# Patient Record
Sex: FEMALE | Race: WHITE | URBAN - METROPOLITAN AREA
[De-identification: names, ages, dates, MRNs, and addresses within clinical notes are randomized per-mention and may not be internally consistent; named-entity substitution may affect disease eponyms.]

---

## 2018-02-08 ENCOUNTER — TRANSFERRED RECORDS (OUTPATIENT)
Dept: HEALTH INFORMATION MANAGEMENT | Facility: CLINIC | Age: 58
End: 2018-02-08

## 2018-02-09 ENCOUNTER — TRANSFERRED RECORDS (OUTPATIENT)
Dept: HEALTH INFORMATION MANAGEMENT | Facility: CLINIC | Age: 58
End: 2018-02-09

## 2018-02-10 ENCOUNTER — TRANSFERRED RECORDS (OUTPATIENT)
Dept: HEALTH INFORMATION MANAGEMENT | Facility: CLINIC | Age: 58
End: 2018-02-10

## 2018-02-20 ENCOUNTER — TRANSFERRED RECORDS (OUTPATIENT)
Dept: HEALTH INFORMATION MANAGEMENT | Facility: CLINIC | Age: 58
End: 2018-02-20

## 2018-02-27 PROCEDURE — 00000346 ZZHCL STATISTIC REVIEW OUTSIDE SLIDES TC 88321: Performed by: OBSTETRICS & GYNECOLOGY

## 2018-03-08 LAB — COPATH REPORT: NORMAL

## 2018-03-14 ENCOUNTER — CARE COORDINATION (OUTPATIENT)
Dept: ONCOLOGY | Facility: CLINIC | Age: 58
End: 2018-03-14

## 2018-03-14 NOTE — PROGRESS NOTES
Patient unavailable for pre-visit call. Voicemail with call back number left.    Lakisha Castillo RN

## 2018-03-14 NOTE — PROGRESS NOTES
New Patient Pre-Visit Phone Call:    1) Verify time, date & provider:    2) What to bring:  - Medication list and/or bring medication bottles  - List or notebook - write down all questions to address at visit    3) What to expect:   - Briefly walk Pt through their day  - Duration (2-4 hrs) Bring reading material. Family members welcome.  - Possible labs, EKG, CXR    4) Records/information  - PCP:   - Referring MD information:    5) Road construction  6) Use [place] [address]    Lakisha Castillo RN

## 2018-03-19 ENCOUNTER — RADIANT APPOINTMENT (OUTPATIENT)
Dept: CT IMAGING | Facility: CLINIC | Age: 58
End: 2018-03-19
Attending: OBSTETRICS & GYNECOLOGY
Payer: COMMERCIAL

## 2018-03-19 ENCOUNTER — ONCOLOGY VISIT (OUTPATIENT)
Dept: ONCOLOGY | Facility: CLINIC | Age: 58
End: 2018-03-19
Attending: OBSTETRICS & GYNECOLOGY
Payer: COMMERCIAL

## 2018-03-19 VITALS
BODY MASS INDEX: 39.53 KG/M2 | HEART RATE: 75 BPM | HEIGHT: 63 IN | SYSTOLIC BLOOD PRESSURE: 148 MMHG | WEIGHT: 223.11 LBS | TEMPERATURE: 98.2 F | RESPIRATION RATE: 18 BRPM | DIASTOLIC BLOOD PRESSURE: 94 MMHG | OXYGEN SATURATION: 98 %

## 2018-03-19 DIAGNOSIS — C54.1 ENDOMETRIAL CANCER (H): ICD-10-CM

## 2018-03-19 DIAGNOSIS — C54.1 ENDOMETRIAL CANCER (H): Primary | ICD-10-CM

## 2018-03-19 LAB
CREAT BLD-MCNC: 0.7 MG/DL (ref 0.52–1.04)
GFR SERPL CREATININE-BSD FRML MDRD: 87 ML/MIN/1.7M2

## 2018-03-19 PROCEDURE — G0463 HOSPITAL OUTPT CLINIC VISIT: HCPCS | Mod: ZF

## 2018-03-19 PROCEDURE — 99205 OFFICE O/P NEW HI 60 MIN: CPT | Mod: ZP | Performed by: OBSTETRICS & GYNECOLOGY

## 2018-03-19 RX ORDER — ONDANSETRON 4 MG/1
TABLET, ORALLY DISINTEGRATING ORAL
COMMUNITY
Start: 2018-03-13 | End: 2019-08-28

## 2018-03-19 RX ORDER — IOPAMIDOL 755 MG/ML
150 INJECTION, SOLUTION INTRAVASCULAR ONCE
Status: COMPLETED | OUTPATIENT
Start: 2018-03-19 | End: 2018-03-19

## 2018-03-19 RX ORDER — PROMETHAZINE HYDROCHLORIDE AND CODEINE PHOSPHATE 6.25; 1 MG/5ML; MG/5ML
10 SYRUP ORAL
Status: ON HOLD | COMMUNITY
Start: 2018-03-13 | End: 2018-07-02

## 2018-03-19 RX ORDER — LOSARTAN POTASSIUM AND HYDROCHLOROTHIAZIDE 12.5; 1 MG/1; MG/1
1 TABLET ORAL
COMMUNITY
Start: 2018-03-01 | End: 2018-11-08

## 2018-03-19 RX ORDER — GLUCOSAMINE HCL/CHONDROITIN SU 500-400 MG
1 CAPSULE ORAL
COMMUNITY
Start: 2018-02-10 | End: 2018-11-08

## 2018-03-19 RX ORDER — ATORVASTATIN CALCIUM 20 MG/1
20 TABLET, FILM COATED ORAL
COMMUNITY
Start: 2018-03-01 | End: 2018-11-08

## 2018-03-19 RX ORDER — PEN NEEDLE, DIABETIC 30 GX5/16"
NEEDLE, DISPOSABLE MISCELLANEOUS
COMMUNITY
Start: 2018-02-10 | End: 2018-11-08

## 2018-03-19 RX ORDER — LANCETS 30 GAUGE
1 EACH MISCELLANEOUS
COMMUNITY
Start: 2018-02-10 | End: 2018-11-08

## 2018-03-19 RX ORDER — DOCUSATE SODIUM 100 MG/1
200 CAPSULE, LIQUID FILLED ORAL
COMMUNITY
Start: 2018-02-14 | End: 2018-10-17

## 2018-03-19 RX ORDER — HYDROCODONE BITARTRATE AND ACETAMINOPHEN 5; 325 MG/1; MG/1
TABLET ORAL
Status: ON HOLD | COMMUNITY
Start: 2018-03-25 | End: 2018-07-02

## 2018-03-19 RX ORDER — ASPIRIN 81 MG/1
81 TABLET, CHEWABLE ORAL
COMMUNITY
Start: 2018-03-01

## 2018-03-19 RX ORDER — DIPHENHYDRAMINE HYDROCHLORIDE 25 MG/1
CAPSULE, LIQUID FILLED ORAL
COMMUNITY
Start: 2018-02-10 | End: 2018-11-08

## 2018-03-19 RX ORDER — NAPROXEN SODIUM 220 MG
220 TABLET ORAL
Status: ON HOLD | COMMUNITY
End: 2018-07-02

## 2018-03-19 RX ADMIN — IOPAMIDOL 150 ML: 755 INJECTION, SOLUTION INTRAVASCULAR at 18:44

## 2018-03-19 ASSESSMENT — PAIN SCALES - GENERAL: PAINLEVEL: NO PAIN (1)

## 2018-03-19 NOTE — LETTER
3/19/2018       RE: Manda Santacruz  [address] [facility] [address]     Dear Colleague,    Thank you for referring your patient, Manda Santacruz, to the [facility]. Please see a copy of my visit note below.                            Consult Notes on Referred Patient    Date: 3/19/2018       Dr. Devonte Figueroa [facility]  [address] [place] [address]       RE: Manda Santacruz  : 1960  CULLEN: 3/19/2018    Dear Dr. Devonte TERRAZAS*:    I had the pleasure of seeing your patient Manda Santacruz here at the Gynecologic Cancer Clinic at the [facility] on 3/19/2018.  As you know she is a very pleasant 57 year old woman with a recent diagnosis of serous endometrial cancer.  Given these findings she was subsequently sent to the Gynecologic Cancer Clinic for new patient consultation.   The patient is G1, P1, 1 prior vaginal delivery.  She has not gone through menopause yet.  She continues bleeding.  She is not on hormone replacement therapy.  She does not have any hot flashes.   Abdominal pain in early February which prompted an emergency room visit and a CT scan revealing an enlarged uterus.  Endometrial biopsy was done at that time revealing a high-grade serous endometrial carcinoma.  It has been reviewed here.  She has otherwise no change in urinary or bowel habits.  No B symptoms.           Past Medical History:  1.  Diabetes on insulin.    2.  Hyperten        Past Surgical History:  Removal of kidney stones.           Health Maintenance:  Health Maintenance Due   Topic Date Due     TETANUS IMMUNIZATION (SYSTEM ASSIGNED)  1979     HEPATITIS C SCREENING  1979     PAP SCREENING Q3 YR (SYSTEM ASSIGNED)  1982     LIPID SCREEN Q5 YR FEMALE (SYSTEM ASSIGNED)  2006     MAMMO SCREEN Q2 YR (SYSTEM ASSIGNED)  2011     COLON CANCER SCREEN (SYSTEM ASSIGNED)  2011     ADVANCE DIRECTIVE PLANNING Q5 YRS  2016     No  "history of abnormal Pap smear; however, last Pap smear had been several years ago.             Current Medications:     has a current medication list which includes the following prescription(s): aspirin, atorvastatin, blood glucose monitor system, blood glucose test strips, docusate sodium, insulin aspart, insulin glargine, lancets, pen needles 5/16\", metformin, losartan-hydrochlorothiazide, ondansetron, promethazine-codeine, iohexol, hydrocodone-acetaminophen, and naproxen sodium.       Allergies:     [unfilled]        Social History:     Social History   Substance Use Topics     Smoking status: Never Smoker     Smokeless tobacco: Never Used     Alcohol use Not on file       History   Drug Use Not on file           Family History:   No known family history of cancer.           Physical Exam:     BP (!) 148/94  Pulse 75  Temp 98.2  F (36.8  C) (Oral)  Resp 18  Ht 1.595 m (5' 2.8\")  Wt 101.2 kg (223 lb 1.7 oz)  SpO2 98%  BMI 39.78 kg/m2  Body mass index is 39.78 kg/(m^2).    General Appearance: healthy and alert, no distress     Skin: no lesions or rashes     Neurological: normal gait, no gross defects     Psychiatric: appropriate mood and affect                               ABDOMEN:  Soft, nontender, nondistended, no organomegaly.   PELVIC:  Normal external genitalia.  Vaginal exam, normal vaginal mucosa.  There is a large protruding mass from the cervix.  This feels jerry as well as appears to involve parametria.  Enlarged uterus, mobile.  No adnexal masses or tenderness.  Rectovaginal confirms.  This also feels to be free of the rectum.           Assessment:    Manda Santacruz is a 57 year old woman with a new diagnosis of serous endometrial cancer.     A total of 60 minutes was spent with the patient, 40 minutes of which were spent in counseling the patient and/or treatment planning.      1.  Serous endometrial carcinoma.   2.  Diabetes.      Discussed with the patient we will obtain a CT chest, " abdomen and pelvis to evaluate for more distant disease.  It appears she has at least locally advanced disease.  She has enlarged tubes.  Mass protruding from the cervix.  I will see her back after we have the CT scan.  If there is no evidence of distant disease, we will proceed with exploratory laparotomy and hysterectomy with possible more of a radical hysterectomy and cytoreductive effort.  Followed by adjuvant chemotherapy and possible radiation depending on findings.  The patient agrees with the plan, is very appreciative of her care.  All questions were answered.             Thank you for allowing us to participate in the care of your patient.         Sincerely,    Prabhjot Rm MD, MS    Department of Obstetrics and Gynecology   Division of Gynecologic Oncology   [facility]  Phone: [phone]      CC  Patient Care Team:  Devonte Carranza as PCP - General

## 2018-03-19 NOTE — MR AVS SNAPSHOT
"              After Visit Summary   3/19/2018    Manda Santacruz    MRN: [id number]           Patient Information     Date Of Birth          1960        Visit Information        Provider Department      3/19/2018 3:00 PM Prabhjot Rm MD [facility]        Today's Diagnoses     Endometrial cancer (H)    -  1       Follow-ups after your visit        Who to contact     If you have questions or need follow up information about today's clinic visit or your schedule please contact [facility], located within [facility] directly at [phone].  Normal or non-critical lab and imaging results will be communicated to you by [employer], letter or phone within 4 business days after the clinic has received the results. If you do not hear from us within 7 days, please contact the clinic through [employer] or phone. If you have a critical or abnormal lab result, we will notify you by phone as soon as possible.  Submit refill requests through [employer] or call your pharmacy and they will forward the refill request to us. Please allow 3 business days for your refill to be completed.          Additional Information About Your Visit        MyChart Information     [employer] lets you send messages to your doctor, view your test results, renew your prescriptions, schedule appointments and more. To sign up, go to [email or web address] . Click on \"Log in\" on the left side of the screen, which will take you to the Welcome page. Then click on \"Sign up Now\" on the right side of the page.     You will be asked to enter the access code listed below, as well as some personal information. Please follow the directions to create your username and password.     Your access code is: [email or web address]  Expires: 2018  1:17 PM     Your access code will  in 90 days. If you need help or a new code, please call your [facility] clinic or [phone].        Care EveryWhere ID     This is your Care EveryWhere ID. This could be used " "by other organizations to access your [facility] medical records  [id number]        Your Vitals Were     Pulse Temperature Respirations Height Pulse Oximetry BMI (Body Mass Index)    75 98.2  F (36.8  C) (Oral) 18 1.595 m (5' 2.8\") 98% 39.78 kg/m2       Blood Pressure from Last 3 Encounters:   03/19/18 (!) 148/94    Weight from Last 3 Encounters:   03/19/18 101.2 kg (223 lb 1.7 oz)              Today, you had the following     No orders found for display         Today's Medication Changes          These changes are accurate as of 3/19/18 11:59 PM.  If you have any questions, ask your nurse or doctor.               Start taking these medicines.        Dose/Directions    iohexol 140 MG/ML Soln solution   Commonly known as:  OMNIPAQUE   Used for:  Endometrial cancer (H)   Started by:  Prabhjot Rm MD        Mix entire bottle (50ml) of contast with 600ml (20 ounces) of water and drink half 2 hrs prior to CT scan and half 1 hr prior to scan   Quantity:  50 mL   Refills:  5            Where to get your medicines      These medications were sent to [facility] [address] [facility] [phone]  [address] [facility] [address] [address]    Hours:  TRANSPLANT PHONE NUMBER [phone] Phone:  [phone]     iohexol 140 MG/ML Soln solution                Primary Care Provider Office Phone # Fax #    [facility] [phone]       [address] [facility] [address]        Equal Access to Services     KIMBERLEE RICHARDS AH: Hadii aad ku hadasho Soomaali, waaxda luqadaha, qaybta kaalmada adeegyamateo, moy buitrago. So [facility] [phone].    ATENCIÓN: Si habla español, tiene a roach disposición servicios gratuitos de asistencia lingüística. Llame al [phone].    We comply with applicable federal civil rights laws and [place] laws. We do not discriminate on the basis of race, color, national origin, age, disability, sex, " "sexual orientation, or gender identity.            Thank you!     Thank you for choosing [facility]  for your care. Our goal is always to provide you with excellent care. Hearing back from our patients is one way we can continue to improve our services. Please take a few minutes to complete the written survey that you may receive in the mail after your visit with us. Thank you!             Your Updated Medication List - Protect others around you: Learn how to safely use, store and throw away your medicines at www.disposemymeds.org.          This list is accurate as of 3/19/18 11:59 PM.  Always use your most recent med list.                   Brand Name Dispense Instructions for use Diagnosis    aspirin 81 MG chewable tablet      81 mg        atorvastatin 20 MG tablet    LIPITOR     20 mg        Blood Glucose Monitor System W/DEVICE Kit      1 Kit        BLOOD GLUCOSE TEST STRIPS Strp      1 strip        docusate sodium 100 MG capsule    COLACE     200 mg        HYDROcodone-acetaminophen 5-325 MG per tablet   Start taking on:  3/25/2018    NORCO     2 po bid prn        insulin aspart 100 UNIT/ML injection    NovoLOG PEN     4-15 Units        insulin glargine 100 UNIT/ML injection    LANTUS     20 Units        iohexol 140 MG/ML Soln solution    OMNIPAQUE    50 mL    Mix entire bottle (50ml) of contast with 600ml (20 ounces) of water and drink half 2 hrs prior to CT scan and half 1 hr prior to scan    Endometrial cancer (H)       Lancets Misc      1 each        losartan-hydrochlorothiazide 100-12.5 MG per tablet    HYZAAR     1 tablet        metFORMIN 1000 MG tablet    GLUCOPHAGE     1,000 mg        naproxen sodium 220 MG tablet    ANAPROX     220 mg        ondansetron 4 MG ODT tab    ZOFRAN-ODT     DISSOLVE ONE TABLET BY MOUTH EVERY 4 HOURS AS NEEDED FOR NAUSEA        Pen Needles 5/16\" 30G X 8 MM Misc      Use as directed        promethazine-codeine 6.25-10 MG/5ML syrup    PHENERGAN with codeine    "  10 mLs

## 2018-03-20 LAB — RADIOLOGIST FLAGS: ABNORMAL

## 2018-03-20 NOTE — PROGRESS NOTES
Consult Notes on Referred Patient    Date: 3/19/2018       Dr. Devonte Quiroz [facility]  [address] [facility]  [place] [address]       RE: Manda Santacruz  : 1960  CULLEN: 3/19/2018    Dear Dr. Devonte TERRAZAS*:    I had the pleasure of seeing your patient Manda Santacruz here at the Gynecologic Cancer Clinic at the [facility] on 3/19/2018.  As you know she is a very pleasant 57 year old woman with a recent diagnosis of serous endometrial cancer.  Given these findings she was subsequently sent to the Gynecologic Cancer Clinic for new patient consultation.   The patient is G1, P1, 1 prior vaginal delivery.  She has not gone through menopause yet.  She continues bleeding.  She is not on hormone replacement therapy.  She does not have any hot flashes.   Abdominal pain in early February which prompted an emergency room visit and a CT scan revealing an enlarged uterus.  Endometrial biopsy was done at that time revealing a high-grade serous endometrial carcinoma.  It has been reviewed here.  She has otherwise no change in urinary or bowel habits.  No B symptoms.           Past Medical History:  1.  Diabetes on insulin.    2.  Hyperten        Past Surgical History:  Removal of kidney stones.           Health Maintenance:  Health Maintenance Due   Topic Date Due     TETANUS IMMUNIZATION (SYSTEM ASSIGNED)  1979     HEPATITIS C SCREENING  1979     PAP SCREENING Q3 YR (SYSTEM ASSIGNED)  1982     LIPID SCREEN Q5 YR FEMALE (SYSTEM ASSIGNED)  2006     MAMMO SCREEN Q2 YR (SYSTEM ASSIGNED)  2011     COLON CANCER SCREEN (SYSTEM ASSIGNED)  2011     ADVANCE DIRECTIVE PLANNING Q5 YRS  2016     No history of abnormal Pap smear; however, last Pap smear had been several years ago.             Current Medications:     has a current medication list which includes the following prescription(s): aspirin, atorvastatin, blood glucose monitor  "system, blood glucose test strips, docusate sodium, insulin aspart, insulin glargine, lancets, pen needles 5/16\", metformin, losartan-hydrochlorothiazide, ondansetron, promethazine-codeine, iohexol, hydrocodone-acetaminophen, and naproxen sodium.       Allergies:     [unfilled]        Social History:     Social History   Substance Use Topics     Smoking status: Never Smoker     Smokeless tobacco: Never Used     Alcohol use Not on file       History   Drug Use Not on file           Family History:   No known family history of cancer.           Physical Exam:     BP (!) 148/94  Pulse 75  Temp 98.2  F (36.8  C) (Oral)  Resp 18  Ht 1.595 m (5' 2.8\")  Wt 101.2 kg (223 lb 1.7 oz)  SpO2 98%  BMI 39.78 kg/m2  Body mass index is 39.78 kg/(m^2).    General Appearance: healthy and alert, no distress     Skin: no lesions or rashes     Neurological: normal gait, no gross defects     Psychiatric: appropriate mood and affect                               ABDOMEN:  Soft, nontender, nondistended, no organomegaly.   PELVIC:  Normal external genitalia.  Vaginal exam, normal vaginal mucosa.  There is a large protruding mass from the cervix.  This feels jerry as well as appears to involve parametria.  Enlarged uterus, mobile.  No adnexal masses or tenderness.  Rectovaginal confirms.  This also feels to be free of the rectum.           Assessment:    Manda Santacruz is a 57 year old woman with a new diagnosis of serous endometrial cancer.     A total of 60 minutes was spent with the patient, 40 minutes of which were spent in counseling the patient and/or treatment planning.      1.  Serous endometrial carcinoma.   2.  Diabetes.      Discussed with the patient we will obtain a CT chest, abdomen and pelvis to evaluate for more distant disease.  It appears she has at least locally advanced disease.  She has enlarged tubes.  Mass protruding from the cervix.  I will see her back after we have the CT scan.  If there is no evidence " of distant disease, we will proceed with exploratory laparotomy and hysterectomy with possible more of a radical hysterectomy and cytoreductive effort.  Followed by adjuvant chemotherapy and possible radiation depending on findings.  The patient agrees with the plan, is very appreciative of her care.  All questions were answered.             Thank you for allowing us to participate in the care of your patient.         Sincerely,    Prabhjot Rm MD, MS    Department of Obstetrics and Gynecology   Division of Gynecologic Oncology   [facility]  Phone: [phone]      CC  Patient Care Team:  Devonte Michael as PCP - General  DEVONTE MICHAEL

## 2018-03-21 DIAGNOSIS — C54.1 ENDOMETRIAL CANCER (H): Primary | ICD-10-CM

## 2018-03-21 NOTE — PROGRESS NOTES
Result reviewed by MD. Patient needs an abdominal MRI. Return visit with Dr. Rm to discuss treatment options. Patient called with follow up plan.

## 2018-03-28 ENCOUNTER — CARE COORDINATION (OUTPATIENT)
Dept: ONCOLOGY | Facility: CLINIC | Age: 58
End: 2018-03-28

## 2018-03-28 ENCOUNTER — ONCOLOGY VISIT (OUTPATIENT)
Dept: ONCOLOGY | Facility: CLINIC | Age: 58
End: 2018-03-28
Attending: OBSTETRICS & GYNECOLOGY
Payer: COMMERCIAL

## 2018-03-28 DIAGNOSIS — F41.9 ANXIETY: Primary | ICD-10-CM

## 2018-03-28 PROCEDURE — 99215 OFFICE O/P EST HI 40 MIN: CPT | Mod: ZP | Performed by: OBSTETRICS & GYNECOLOGY

## 2018-03-28 RX ORDER — LORAZEPAM 0.5 MG/1
0.5 TABLET ORAL DAILY PRN
Qty: 2 TABLET | Refills: 0 | Status: SHIPPED | OUTPATIENT
Start: 2018-03-28 | End: 2018-10-17

## 2018-03-28 NOTE — LETTER
"3/28/2018       RE: Manda Santacruz  [address] [place] [id number]     Dear Colleague,    Thank you for referring your patient, Manda Santacruz, to the [facility]. Please see a copy of my visit note below.                Follow Up Notes on Referred Patient    Date: 3/28/2018       Dr. Devonte Figueroa [facility]  [address] [place] [address]       RE: Manda Santacruz  : 1960  CULLEN: 3/28/2018    Dear Dr. Devonte Quiroz C*:    Manda Santacruz is a 57 year old woman with a diagnosis of serous endometrial cancer.    The patient presents today for followup.  She has no new symptoms since the last time I have seen her.               Health Maintenance Due   Topic Date Due     TETANUS IMMUNIZATION (SYSTEM ASSIGNED)  1979     HEPATITIS C SCREENING  1979     PAP SCREENING Q3 YR (SYSTEM ASSIGNED)  1982     LIPID SCREEN Q5 YR FEMALE (SYSTEM ASSIGNED)  2006     MAMMO SCREEN Q2 YR (SYSTEM ASSIGNED)  2011     COLON CANCER SCREEN (SYSTEM ASSIGNED)  2011     ADVANCE DIRECTIVE PLANNING Q5 YRS  2016       Current Medications:     Current Outpatient Prescriptions   Medication Sig Dispense Refill     LORazepam (ATIVAN) 0.5 MG tablet Take 1 tablet (0.5 mg) by mouth daily as needed for anxiety (Take one tablet now. Repeat in 20 minutes if needed) 2 tablet 0     aspirin 81 MG chewable tablet 81 mg       atorvastatin (LIPITOR) 20 MG tablet 20 mg       Blood Glucose Monitoring Suppl (BLOOD GLUCOSE MONITOR SYSTEM) W/DEVICE KIT 1 Kit       Glucose Blood (BLOOD GLUCOSE TEST STRIPS) STRP 1 strip       docusate sodium (COLACE) 100 MG capsule 200 mg       HYDROcodone-acetaminophen (NORCO) 5-325 MG per tablet 2 po bid prn       insulin aspart (NOVOLOG PEN) 100 UNIT/ML injection 4-15 Units       insulin glargine (LANTUS) 100 UNIT/ML injection 20 Units       Lancets MISC 1 each       Insulin Pen Needle (PEN NEEDLES \") 30G X 8 MM MISC Use " as directed       metFORMIN (GLUCOPHAGE) 1000 MG tablet 1,000 mg       losartan-hydrochlorothiazide (HYZAAR) 100-12.5 MG per tablet 1 tablet       naproxen sodium (ANAPROX) 220 MG tablet 220 mg       ondansetron (ZOFRAN-ODT) 4 MG ODT tab DISSOLVE ONE TABLET BY MOUTH EVERY 4 HOURS AS NEEDED FOR NAUSEA       promethazine-codeine (PHENERGAN WITH CODEINE) 6.25-10 MG/5ML syrup 10 mLs       iohexol (OMNIPAQUE) 140 MG/ML SOLN solution Mix entire bottle (50ml) of contast with 600ml (20 ounces) of water and drink half 2 hrs prior to CT scan and half 1 hr prior to scan 50 mL 5         Allergies:      No Known Allergies     Social History:     Social History   Substance Use Topics     Smoking status: Never Smoker     Smokeless tobacco: Never Used     Alcohol use Not on file       History   Drug Use Not on file             Physical Exam:     There were no vitals taken for this visit.  There is no height or weight on file to calculate BMI.    General Appearance: healthy and alert, no distress           Assessment:    Manda Santacruz is a 57 year old woman with a diagnosis of serous endometrial cancer.     A total of 40 minutes was spent with the patient, 30 minutes of which were spent in counseling the patient and/or treatment planning.    1.  Advanced high-grade serous endometrial cancer.   2.  Suspected stage IV.      I had a long discussion with the patient as well as with her family.  We did review in detail the CT scan, which suggests locally advanced endometrial cancer with colon, rectum as well as small bowel and adnexal involvement.  This is consistent with the exam I performed on the last visit.  We will obtain an MRI today for further clarification.  We did discuss that would make her a stage IV, and a surgical R0 resection would require extensive surgical cytoreduction, including possible end colostomy, large as well as small bowel resections.  We did discuss that I would recommend to start with systemic  chemotherapy with carboplatin with an AUC of 6 and paclitaxel in a dose of 175 mg/m2 every 3 weeks for 3 cycles and then reassess with a CT chest, abdomen and pelvis here.  She will plan to have this treatment closer to home in [place].  We did discuss the pros and cons of primary cytoreduction followed by chemotherapy versus adjuvant chemotherapy.  Starting with neoadjuvant chemotherapy will tell us whether this tumor is chemosensitive as well as potentially reduce surgical risk and improve chances for R0 resection.  The patient as well as her family agree with this plan.  They are very appreciative of her care.  All questions were answered.       Prabhjot Rm MD, MS    Department of Obstetrics and Gynecology   Division of Gynecologic Oncology   [facility]  Phone: [phone]        CC  Patient Care Team:  Devonte Carranza as PCP - General

## 2018-03-28 NOTE — MR AVS SNAPSHOT
"              After Visit Summary   3/28/2018    Manda Santacruz    MRN: [id number]           Patient Information     Date Of Birth          1960        Visit Information        Provider Department      3/28/2018 2:00 PM Prabhjot Rm MD [facility]        Today's Diagnoses     Anxiety    -  1       Follow-ups after your visit        Who to contact     If you have questions or need follow up information about today's clinic visit or your schedule please contact [facility] directly at [phone].  Normal or non-critical lab and imaging results will be communicated to you by Cueddhart, letter or phone within 4 business days after the clinic has received the results. If you do not hear from us within 7 days, please contact the clinic through Cueddhart or phone. If you have a critical or abnormal lab result, we will notify you by phone as soon as possible.  Submit refill requests through Jinni or call your pharmacy and they will forward the refill request to us. Please allow 3 business days for your refill to be completed.          Additional Information About Your Visit        MyChart Information     Jinni lets you send messages to your doctor, view your test results, renew your prescriptions, schedule appointments and more. To sign up, go to [email or web address] . Click on \"Log in\" on the left side of the screen, which will take you to the Welcome page. Then click on \"Sign up Now\" on the right side of the page.     You will be asked to enter the access code listed below, as well as some personal information. Please follow the directions to create your username and password.     Your access code is: [email or web address]  Expires: 2018  1:17 PM     Your access code will  in 90 days. If you need help or a new code, please call your [facility] or [phone].        Care EveryWhere ID     This is your Care EveryWhere ID. This could be used by other " organizations to access your [facility] medical records  [id number]         Blood Pressure from Last 3 Encounters:   03/19/18 (!) 148/94    Weight from Last 3 Encounters:   03/19/18 101.2 kg (223 lb 1.7 oz)              Today, you had the following     No orders found for display         Today's Medication Changes          These changes are accurate as of 3/28/18 11:59 PM.  If you have any questions, ask your nurse or doctor.               Start taking these medicines.        Dose/Directions    LORazepam 0.5 MG tablet   Commonly known as:  ATIVAN   Used for:  Anxiety   Started by:  Prabhjot Rm MD        Dose:  0.5 mg   Take 1 tablet (0.5 mg) by mouth daily as needed for anxiety (Take one tablet now. Repeat in 20 minutes if needed)   Quantity:  2 tablet   Refills:  0            Where to get your medicines      Some of these will need a paper prescription and others can be bought over the counter.  Ask your nurse if you have questions.     Bring a paper prescription for each of these medications     LORazepam 0.5 MG tablet                Primary Care Provider Office Phone # Fax #    Devonte [facility] [phone]       [address] [address]        Equal Access to Services     KIMBERLEE RICHARDS AH: Hadii michael hitchcock hadasho Someshaali, waaxda luqadaha, qaybta kaalmada adeegyada, moy buitrago. So [facility] [phone].    ATENCIÓN: Si habla español, tiene a roach disposición servicios gratuitos de asistencia lingüística. Llame al [phone].    We comply with applicable federal civil rights laws and [place] laws. We do not discriminate on the basis of race, color, national origin, age, disability, sex, sexual orientation, or gender identity.            Thank you!     Thank you for choosing [facility]  for your care. Our goal is always to provide you with excellent care. Hearing back from our patients is one way we can continue to improve  "our services. Please take a few minutes to complete the written survey that you may receive in the mail after your visit with us. Thank you!             Your Updated Medication List - Protect others around you: Learn how to safely use, store and throw away your medicines at www.disposemymeds.org.          This list is accurate as of 3/28/18 11:59 PM.  Always use your most recent med list.                   Brand Name Dispense Instructions for use Diagnosis    aspirin 81 MG chewable tablet      81 mg        atorvastatin 20 MG tablet    LIPITOR     20 mg        Blood Glucose Monitor System W/DEVICE Kit      1 Kit        BLOOD GLUCOSE TEST STRIPS Strp      1 strip        docusate sodium 100 MG capsule    COLACE     200 mg        HYDROcodone-acetaminophen 5-325 MG per tablet    NORCO     2 po bid prn        insulin aspart 100 UNIT/ML injection    NovoLOG PEN     4-15 Units        insulin glargine 100 UNIT/ML injection    LANTUS     20 Units        iohexol 140 MG/ML Soln solution    OMNIPAQUE    50 mL    Mix entire bottle (50ml) of contast with 600ml (20 ounces) of water and drink half 2 hrs prior to CT scan and half 1 hr prior to scan    Endometrial cancer (H)       Lancets Misc      1 each        LORazepam 0.5 MG tablet    ATIVAN    2 tablet    Take 1 tablet (0.5 mg) by mouth daily as needed for anxiety (Take one tablet now. Repeat in 20 minutes if needed)    Anxiety       losartan-hydrochlorothiazide 100-12.5 MG per tablet    HYZAAR     1 tablet        metFORMIN 1000 MG tablet    GLUCOPHAGE     1,000 mg        naproxen sodium 220 MG tablet    ANAPROX     220 mg        ondansetron 4 MG ODT tab    ZOFRAN-ODT     DISSOLVE ONE TABLET BY MOUTH EVERY 4 HOURS AS NEEDED FOR NAUSEA        Pen Needles 5/16\" 30G X 8 MM Misc      Use as directed        promethazine-codeine 6.25-10 MG/5ML syrup    PHENERGAN with codeine     10 mLs          "

## 2018-03-28 NOTE — PROGRESS NOTES
[facility] GYN-Oncology Teaching Note     Relevant Diagnosis:  Endometrial Cancer    Teaching Topic:  Chemotherapy    Person(s) involved in teaching:  Patient and daughter    Motivation Level:   Asks Questions:   Yes  Eager to Learn:  Yes  Cooperative:  Yes  Receptive (willing/able to accept information):  Yes      Patient demonstrates understanding of the following:  Reason for the appointment, diagnosis and treatment plan:  Yes  Knowledge of proper use of medications and conditions for which they are ordered (with special attention to potential side effects or drug interactions):  Yes  Which situations necessitate calling provider and whom to contact:  Yes    Teaching Concerns:  Plan Taxol/Carbo every 21 days x 3 cycles then to return with CT scan to see Dr. Rm.  Patent will have chemo locally at [facility] [place].    Instructional Materials Used/Given:  Chemotherapy Packet and Cards   Care Coordinator Card    Lori ASLMON, RN  Care Coordinator  Gynecologic Cancer   Office:  [phone]  Pager: [phone] #[phone]

## 2018-03-29 ENCOUNTER — TELEPHONE (OUTPATIENT)
Dept: ONCOLOGY | Facility: CLINIC | Age: 58
End: 2018-03-29

## 2018-03-29 NOTE — PROGRESS NOTES
"            Follow Up Notes on Referred Patient    Date: 3/28/2018       Dr. Devonte Quiroz [facility]  [address]  [place] [address]       RE: Manda Santacruz  : 1960  CULLEN: 3/28/2018    Dear Dr. Devonte Quiroz C*:    Manda Santacruz is a 57 year old woman with a diagnosis of serous endometrial cancer.    The patient presents today for followup.  She has no new symptoms since the last time I have seen her.               Health Maintenance Due   Topic Date Due     TETANUS IMMUNIZATION (SYSTEM ASSIGNED)  1979     HEPATITIS C SCREENING  1979     PAP SCREENING Q3 YR (SYSTEM ASSIGNED)  1982     LIPID SCREEN Q5 YR FEMALE (SYSTEM ASSIGNED)  2006     MAMMO SCREEN Q2 YR (SYSTEM ASSIGNED)  2011     COLON CANCER SCREEN (SYSTEM ASSIGNED)  2011     ADVANCE DIRECTIVE PLANNING Q5 YRS  2016       Current Medications:     Current Outpatient Prescriptions   Medication Sig Dispense Refill     LORazepam (ATIVAN) 0.5 MG tablet Take 1 tablet (0.5 mg) by mouth daily as needed for anxiety (Take one tablet now. Repeat in 20 minutes if needed) 2 tablet 0     aspirin 81 MG chewable tablet 81 mg       atorvastatin (LIPITOR) 20 MG tablet 20 mg       Blood Glucose Monitoring Suppl (BLOOD GLUCOSE MONITOR SYSTEM) W/DEVICE KIT 1 Kit       Glucose Blood (BLOOD GLUCOSE TEST STRIPS) STRP 1 strip       docusate sodium (COLACE) 100 MG capsule 200 mg       HYDROcodone-acetaminophen (NORCO) 5-325 MG per tablet 2 po bid prn       insulin aspart (NOVOLOG PEN) 100 UNIT/ML injection 4-15 Units       insulin glargine (LANTUS) 100 UNIT/ML injection 20 Units       Lancets MISC 1 each       Insulin Pen Needle (PEN NEEDLES \") 30G X 8 MM MISC Use as directed       metFORMIN (GLUCOPHAGE) 1000 MG tablet 1,000 mg       losartan-hydrochlorothiazide (HYZAAR) 100-12.5 MG per tablet 1 tablet       naproxen sodium (ANAPROX) 220 MG tablet 220 mg       ondansetron (ZOFRAN-ODT) 4 MG ODT tab " DISSOLVE ONE TABLET BY MOUTH EVERY 4 HOURS AS NEEDED FOR NAUSEA       promethazine-codeine (PHENERGAN WITH CODEINE) 6.25-10 MG/5ML syrup 10 mLs       iohexol (OMNIPAQUE) 140 MG/ML SOLN solution Mix entire bottle (50ml) of contast with 600ml (20 ounces) of water and drink half 2 hrs prior to CT scan and half 1 hr prior to scan 50 mL 5         Allergies:      No Known Allergies     Social History:     Social History   Substance Use Topics     Smoking status: Never Smoker     Smokeless tobacco: Never Used     Alcohol use Not on file       History   Drug Use Not on file             Physical Exam:     There were no vitals taken for this visit.  There is no height or weight on file to calculate BMI.    General Appearance: healthy and alert, no distress           Assessment:    Manda Santacruz is a 57 year old woman with a diagnosis of serous endometrial cancer.     A total of 40 minutes was spent with the patient, 30 minutes of which were spent in counseling the patient and/or treatment planning.    1.  Advanced high-grade serous endometrial cancer.   2.  Suspected stage IV.      I had a long discussion with the patient as well as with her family.  We did review in detail the CT scan, which suggests locally advanced endometrial cancer with colon, rectum as well as small bowel and adnexal involvement.  This is consistent with the exam I performed on the last visit.  We will obtain an MRI today for further clarification.  We did discuss that would make her a stage IV, and a surgical R0 resection would require extensive surgical cytoreduction, including possible end colostomy, large as well as small bowel resections.  We did discuss that I would recommend to start with systemic chemotherapy with carboplatin with an AUC of 6 and paclitaxel in a dose of 175 mg/m2 every 3 weeks for 3 cycles and then reassess with a CT chest, abdomen and pelvis here.  She will plan to have this treatment closer to home in [place].  We did  discuss the pros and cons of primary cytoreduction followed by chemotherapy versus adjuvant chemotherapy.  Starting with neoadjuvant chemotherapy will tell us whether this tumor is chemosensitive as well as potentially reduce surgical risk and improve chances for R0 resection.  The patient as well as her family agree with this plan.  They are very appreciative of her care.  All questions were answered.       Prabhjot Rm MD, MS    Department of Obstetrics and Gynecology   Division of Gynecologic Oncology   [facility]  Phone: [phone]        CC  Patient Care Team:  Devonte Michael as PCP - General  DEVONTE MICHAEL

## 2018-03-29 NOTE — TELEPHONE ENCOUNTER
RN faxed all needed information to Dr. Gaming in [place]. Patient will be starting chemotherapy in [place] prior to surgery.     Lakisha Castillo RN

## 2018-06-06 DIAGNOSIS — C54.1 ENDOMETRIAL CANCER (H): Primary | ICD-10-CM

## 2018-06-19 ENCOUNTER — RADIANT APPOINTMENT (OUTPATIENT)
Dept: CT IMAGING | Facility: CLINIC | Age: 58
End: 2018-06-19
Attending: OBSTETRICS & GYNECOLOGY
Payer: COMMERCIAL

## 2018-06-19 DIAGNOSIS — C54.1 ENDOMETRIAL CANCER (H): ICD-10-CM

## 2018-06-19 RX ORDER — IOPAMIDOL 755 MG/ML
135 INJECTION, SOLUTION INTRAVASCULAR ONCE
Status: COMPLETED | OUTPATIENT
Start: 2018-06-19 | End: 2018-06-19

## 2018-06-19 RX ADMIN — IOPAMIDOL 135 ML: 755 INJECTION, SOLUTION INTRAVASCULAR at 13:40

## 2018-06-19 NOTE — DISCHARGE INSTRUCTIONS

## 2018-06-20 ENCOUNTER — ONCOLOGY VISIT (OUTPATIENT)
Dept: ONCOLOGY | Facility: CLINIC | Age: 58
End: 2018-06-20
Attending: OBSTETRICS & GYNECOLOGY
Payer: COMMERCIAL

## 2018-06-20 VITALS
BODY MASS INDEX: 33.13 KG/M2 | HEART RATE: 93 BPM | TEMPERATURE: 97.5 F | RESPIRATION RATE: 16 BRPM | HEIGHT: 63 IN | SYSTOLIC BLOOD PRESSURE: 134 MMHG | WEIGHT: 187 LBS | OXYGEN SATURATION: 98 % | DIASTOLIC BLOOD PRESSURE: 83 MMHG

## 2018-06-20 DIAGNOSIS — C54.1 ENDOMETRIAL CANCER (H): Primary | ICD-10-CM

## 2018-06-20 PROCEDURE — G0463 HOSPITAL OUTPT CLINIC VISIT: HCPCS | Mod: ZF

## 2018-06-20 PROCEDURE — 99214 OFFICE O/P EST MOD 30 MIN: CPT | Mod: ZP | Performed by: OBSTETRICS & GYNECOLOGY

## 2018-06-20 RX ORDER — OLANZAPINE 10 MG/1
TABLET ORAL
Status: ON HOLD | COMMUNITY
Start: 2018-05-29 | End: 2018-07-02

## 2018-06-20 RX ORDER — PROCHLORPERAZINE MALEATE 10 MG
10 TABLET ORAL
COMMUNITY
Start: 2018-04-16 | End: 2018-11-09

## 2018-06-20 ASSESSMENT — PAIN SCALES - GENERAL: PAINLEVEL: NO PAIN (0)

## 2018-06-20 NOTE — LETTER
2018       RE: Manda Santacruz  [address] [place] [address]     Dear Colleague,    Thank you for referring your patient, Manda Santacruz, to the [facility]. Please see a copy of my visit note below.                Follow Up Notes on Referred Patient    Date: 2018       Dr. Neeru Segura MD  No address on file       RE: Manda Santacruz  : 1960  CULLEN: 2018    Dear Dr. Neeru Segura:    Manda Santacruz is a 57 year old woman with a diagnosis of stage IV serous uterine cancer.     The patient presents today for followup.  She has had 3 cycles of neoadjuvant chemotherapy with carboplatin and paclitaxel.  She still feels occasionally weak and dizzy.  Her hemoglobin has been in the 8.5 range.  She is otherwise eating and drinking okay besides the first week after chemo when she has less of an appetite.  It sounds like it is due to a metallic taste in her mouth.  Otherwise, the bleeding stopped within the first 2 months.  She does not have anymore vaginal bleeding.  She did have some constipation that has resolved.  There is normal urinary and bowel function.  No B symptoms.               Health Maintenance Due   Topic Date Due     PHQ-2 Q1 YR  1973     TETANUS IMMUNIZATION (SYSTEM ASSIGNED)  1979     HIV SCREEN (SYSTEM ASSIGNED)  1979     HEPATITIS C SCREENING  1979     PAP SCREENING Q3 YR (SYSTEM ASSIGNED)  1982     LIPID SCREEN Q5 YR FEMALE (SYSTEM ASSIGNED)  2006     MAMMO SCREEN Q2 YR (SYSTEM ASSIGNED)  2011     COLON CANCER SCREEN (SYSTEM ASSIGNED)  2011     ADVANCE DIRECTIVE PLANNING Q5 YRS  2016       Current Medications:     Current Outpatient Prescriptions   Medication Sig Dispense Refill     aspirin 81 MG chewable tablet 81 mg       docusate sodium (COLACE) 100 MG capsule 200 mg       HYDROcodone-acetaminophen (NORCO) 5-325 MG per tablet 2 po bid prn       insulin aspart (NOVOLOG PEN) 100  "UNIT/ML injection 4-15 Units       insulin glargine (LANTUS) 100 UNIT/ML injection 20 Units       metFORMIN (GLUCOPHAGE) 1000 MG tablet 1,000 mg       ondansetron (ZOFRAN-ODT) 4 MG ODT tab DISSOLVE ONE TABLET BY MOUTH EVERY 4 HOURS AS NEEDED FOR NAUSEA       prochlorperazine (COMPAZINE) 10 MG tablet 10 mg       atorvastatin (LIPITOR) 20 MG tablet 20 mg       Blood Glucose Monitoring Suppl (BLOOD GLUCOSE MONITOR SYSTEM) W/DEVICE KIT 1 Kit       Glucose Blood (BLOOD GLUCOSE TEST STRIPS) STRP 1 strip       Insulin Pen Needle (PEN NEEDLES 5/16\") 30G X 8 MM MISC Use as directed       iohexol (OMNIPAQUE) 140 MG/ML SOLN solution Mix entire bottle (50ml) of contast with 600ml (20 ounces) of water and drink half 2 hrs prior to CT scan and half 1 hr prior to scan (Patient not taking: Reported on 6/20/2018) 50 mL 0     iohexol (OMNIPAQUE) 140 MG/ML SOLN solution Mix entire bottle (50ml) of contast with 600ml (20 ounces) of water and drink half 2 hrs prior to CT scan and half 1 hr prior to scan (Patient not taking: Reported on 6/20/2018) 50 mL 5     Lancets MISC 1 each       LORazepam (ATIVAN) 0.5 MG tablet Take 1 tablet (0.5 mg) by mouth daily as needed for anxiety (Take one tablet now. Repeat in 20 minutes if needed) (Patient not taking: Reported on 6/20/2018) 2 tablet 0     losartan-hydrochlorothiazide (HYZAAR) 100-12.5 MG per tablet 1 tablet       naproxen sodium (ANAPROX) 220 MG tablet 220 mg       OLANZapine (ZYPREXA) 10 MG tablet        promethazine-codeine (PHENERGAN WITH CODEINE) 6.25-10 MG/5ML syrup 10 mLs           Allergies:      No Known Allergies     Social History:     Social History   Substance Use Topics     Smoking status: Never Smoker     Smokeless tobacco: Never Used     Alcohol use Not on file       History   Drug Use Not on file           Physical Exam:     /83  Pulse 93  Temp 97.5  F (36.4  C) (Oral)  Resp 16  Ht 1.594 m (5' 2.75\")  Wt 84.8 kg (187 lb)  SpO2 98%  BMI 33.39 kg/m2  Body mass " index is 33.39 kg/(m^2).    General Appearance: healthy and alert, no distress     Musculoskeletal: extremities non tender and without edema    Neurological: normal gait, no gross defects     Psychiatric: appropriate mood and affect                               ABDOMEN:  Soft, nontender and nondistended.  No organomegaly.             Assessment:    Manda Santacruz is a 57 year old woman with a diagnosis of stage IV serous uterine cancer.     A total of 30 minutes was spent with the patient, 25 minutes of which were spent in counseling the patient and/or treatment planning.    1.  Stage IV serous uterine cancer.   2.  Neoadjuvant chemotherapy.   3.  Diagnosis of anemia.        I discussed with the patient.  We reviewed the scan with her and her family.  She had a good treatment response.  Given that she had some difficulties initially with chemotherapy and still significant tumor burden, we discussed to continue her on 3 cycles of neoadjuvant chemotherapy with carboplatin dose with an AUC of 6 and Taxol dose of 175 mg/m2 for another 3 cycles q. 3 weeks with followup by CT chest, abdomen and pelvis for evaluation and then most likely surgical cytoreduction at that point.  I also discussed with her that I would consider giving her 1-2 units of packed red blood cells given her anemia and being on iron.  Her hemoglobin has not really increased, even though she has much less bleeding, but is symptomatic with anemia.  The patient will be seen by a local medical oncologist, and then we will see her back in 3 months.  They agree with the plan.  They are very appreciative of her care.  All questions were answered.       Prabhjot Rm MD, MS    Department of Obstetrics and Gynecology   Division of Gynecologic Oncology   [facility]  Phone: [phone]        CC  Patient Care Team:  Dillon Whitneyelana Kayji Richie as PCP - General

## 2018-06-20 NOTE — PROGRESS NOTES
Follow Up Notes on Referred Patient    Date: 2018       Dr. Neeru Segura MD  No address on file       RE: Manda Santacruz  : 1960  CULLEN: 2018    Dear Dr. Neeru Segura:    Manda Santacruz is a 57 year old woman with a diagnosis of stage IV serous uterine cancer.     The patient presents today for followup.  She has had 3 cycles of neoadjuvant chemotherapy with carboplatin and paclitaxel.  She still feels occasionally weak and dizzy.  Her hemoglobin has been in the 8.5 range.  She is otherwise eating and drinking okay besides the first week after chemo when she has less of an appetite.  It sounds like it is due to a metallic taste in her mouth.  Otherwise, the bleeding stopped within the first 2 months.  She does not have anymore vaginal bleeding.  She did have some constipation that has resolved.  There is normal urinary and bowel function.  No B symptoms.               Health Maintenance Due   Topic Date Due     PHQ-2 Q1 YR  1973     TETANUS IMMUNIZATION (SYSTEM ASSIGNED)  1979     HIV SCREEN (SYSTEM ASSIGNED)  1979     HEPATITIS C SCREENING  1979     PAP SCREENING Q3 YR (SYSTEM ASSIGNED)  1982     LIPID SCREEN Q5 YR FEMALE (SYSTEM ASSIGNED)  2006     MAMMO SCREEN Q2 YR (SYSTEM ASSIGNED)  2011     COLON CANCER SCREEN (SYSTEM ASSIGNED)  2011     ADVANCE DIRECTIVE PLANNING Q5 YRS  2016       Current Medications:     Current Outpatient Prescriptions   Medication Sig Dispense Refill     aspirin 81 MG chewable tablet 81 mg       docusate sodium (COLACE) 100 MG capsule 200 mg       HYDROcodone-acetaminophen (NORCO) 5-325 MG per tablet 2 po bid prn       insulin aspart (NOVOLOG PEN) 100 UNIT/ML injection 4-15 Units       insulin glargine (LANTUS) 100 UNIT/ML injection 20 Units       metFORMIN (GLUCOPHAGE) 1000 MG tablet 1,000 mg       ondansetron (ZOFRAN-ODT) 4 MG ODT tab DISSOLVE ONE TABLET BY MOUTH EVERY 4 HOURS AS NEEDED FOR  "NAUSEA       prochlorperazine (COMPAZINE) 10 MG tablet 10 mg       atorvastatin (LIPITOR) 20 MG tablet 20 mg       Blood Glucose Monitoring Suppl (BLOOD GLUCOSE MONITOR SYSTEM) W/DEVICE KIT 1 Kit       Glucose Blood (BLOOD GLUCOSE TEST STRIPS) STRP 1 strip       Insulin Pen Needle (PEN NEEDLES 5/16\") 30G X 8 MM MISC Use as directed       iohexol (OMNIPAQUE) 140 MG/ML SOLN solution Mix entire bottle (50ml) of contast with 600ml (20 ounces) of water and drink half 2 hrs prior to CT scan and half 1 hr prior to scan (Patient not taking: Reported on 6/20/2018) 50 mL 0     iohexol (OMNIPAQUE) 140 MG/ML SOLN solution Mix entire bottle (50ml) of contast with 600ml (20 ounces) of water and drink half 2 hrs prior to CT scan and half 1 hr prior to scan (Patient not taking: Reported on 6/20/2018) 50 mL 5     Lancets MISC 1 each       LORazepam (ATIVAN) 0.5 MG tablet Take 1 tablet (0.5 mg) by mouth daily as needed for anxiety (Take one tablet now. Repeat in 20 minutes if needed) (Patient not taking: Reported on 6/20/2018) 2 tablet 0     losartan-hydrochlorothiazide (HYZAAR) 100-12.5 MG per tablet 1 tablet       naproxen sodium (ANAPROX) 220 MG tablet 220 mg       OLANZapine (ZYPREXA) 10 MG tablet        promethazine-codeine (PHENERGAN WITH CODEINE) 6.25-10 MG/5ML syrup 10 mLs           Allergies:      No Known Allergies     Social History:     Social History   Substance Use Topics     Smoking status: Never Smoker     Smokeless tobacco: Never Used     Alcohol use Not on file       History   Drug Use Not on file           Physical Exam:     /83  Pulse 93  Temp 97.5  F (36.4  C) (Oral)  Resp 16  Ht 1.594 m (5' 2.75\")  Wt 84.8 kg (187 lb)  SpO2 98%  BMI 33.39 kg/m2  Body mass index is 33.39 kg/(m^2).    General Appearance: healthy and alert, no distress     Musculoskeletal: extremities non tender and without edema    Neurological: normal gait, no gross defects     Psychiatric: appropriate mood and affect                 "               ABDOMEN:  Soft, nontender and nondistended.  No organomegaly.             Assessment:    Manda Santacruz is a 57 year old woman with a diagnosis of stage IV serous uterine cancer.     A total of 30 minutes was spent with the patient, 25 minutes of which were spent in counseling the patient and/or treatment planning.    1.  Stage IV serous uterine cancer.   2.  Neoadjuvant chemotherapy.   3.  Diagnosis of anemia.        I discussed with the patient.  We reviewed the scan with her and her family.  She had a good treatment response.  Given that she had some difficulties initially with chemotherapy and still significant tumor burden, we discussed to continue her on 3 cycles of neoadjuvant chemotherapy with carboplatin dose with an AUC of 6 and Taxol dose of 175 mg/m2 for another 3 cycles q. 3 weeks with followup by CT chest, abdomen and pelvis for evaluation and then most likely surgical cytoreduction at that point.  I also discussed with her that I would consider giving her 1-2 units of packed red blood cells given her anemia and being on iron.  Her hemoglobin has not really increased, even though she has much less bleeding, but is symptomatic with anemia.  The patient will be seen by a local medical oncologist, and then we will see her back in 3 months.  They agree with the plan.  They are very appreciative of her care.  All questions were answered.       Prabhjot Rm MD, MS    Department of Obstetrics and Gynecology   Division of Gynecologic Oncology   [facility]  Phone: [phone]        CC  Patient Care Team:  Clinic, [facility] as PCP - General  SELF, REFERRED

## 2018-06-20 NOTE — MR AVS SNAPSHOT
"              After Visit Summary   6/20/2018    Manda Santacruz    MRN: [id number]           Patient Information     Date Of Birth          1960        Visit Information        Provider Department      6/20/2018 8:00 AM Prabhjot Rm MD [facility]        Today's Diagnoses     Endometrial cancer (H)    -  1       Follow-ups after your visit        Your next 10 appointments already scheduled     Jun 25, 2018 11:20 AM CDT   (Arrive by 11:05 AM)   Return Visit with Prabhjot Rm MD   [facility] ([facility])    [address] [place] [address]  [address]  [facility] [address] [phone]              Future tests that were ordered for you today     Open Future Orders        Priority Expected Expires Ordered    CT Chest/Abdomen/Pelvis w Contrast Routine  6/20/2019 6/20/2018            Who to contact     If you have questions or need follow up information about today's clinic visit or your schedule please contact [facility] directly at [phone].  Normal or non-critical lab and imaging results will be communicated to you by MyChart, letter or phone within 4 business days after the clinic has received the results. If you do not hear from us within 7 days, please contact the clinic through [employer] or phone. If you have a critical or abnormal lab result, we will notify you by phone as soon as possible.  Submit refill requests through [employer] or call your pharmacy and they will forward the refill request to us. Please allow 3 business days for your refill to be completed.          Additional Information About Your Visit        [employer] Information     [employer] lets you send messages to your doctor, view your test results, renew your prescriptions, schedule appointments and more. To sign up, go to [email or web address] . Click on \"Log in\" on the left side of the screen, which will take you to the Welcome page. Then click " "on \"Sign up Now\" on the right side of the page.     You will be asked to enter the access code listed below, as well as some personal information. Please follow the directions to create your username and password.     Your access code is: [email or web address]  Expires: 2018  6:30 AM     Your access code will  in 90 days. If you need help or a new code, please call your [place] clinic or [phone].        Care EveryWhere ID     This is your Care EveryWhere ID. This could be used by other organizations to access your [place] medical records  [id number]        Your Vitals Were     Pulse Temperature Respirations Height Pulse Oximetry BMI (Body Mass Index)    93 97.5  F (36.4  C) (Oral) 16 1.594 m (5' 2.75\") 98% 33.39 kg/m2       Blood Pressure from Last 3 Encounters:   18 134/83   18 (!) 148/94    Weight from Last 3 Encounters:   18 84.8 kg (187 lb)   18 101.2 kg (223 lb 1.7 oz)                 Today's Medication Changes          These changes are accurate as of 18 11:59 PM.  If you have any questions, ask your nurse or doctor.               These medicines have changed or have updated prescriptions.        Dose/Directions    * iohexol 140 MG/ML Soln solution   Commonly known as:  OMNIPAQUE   This may have changed:  Another medication with the same name was added. Make sure you understand how and when to take each.   Used for:  Endometrial cancer (H)   Changed by:  Prabhjot Rm MD        Mix entire bottle (50ml) of contast with 600ml (20 ounces) of water and drink half 2 hrs prior to CT scan and half 1 hr prior to scan   Quantity:  50 mL   Refills:  5       * iohexol 140 MG/ML Soln solution   Commonly known as:  OMNIPAQUE   This may have changed:  Another medication with the same name was added. Make sure you understand how and when to take each.   Used for:  Endometrial cancer (H)   Changed by:  Prabhjot Rm MD        Mix entire bottle (50ml) of contast with 600ml " (20 ounces) of water and drink half 2 hrs prior to CT scan and half 1 hr prior to scan   Quantity:  50 mL   Refills:  0       * iohexol 140 MG/ML Soln solution   Commonly known as:  OMNIPAQUE   This may have changed:  You were already taking a medication with the same name, and this prescription was added. Make sure you understand how and when to take each.   Used for:  Endometrial cancer (H)   Changed by:  Prabhjot Rm MD        Dose:  50 mL   Take 50 mLs by mouth once for 1 dose   Quantity:  50 mL   Refills:  3       * Notice:  This list has 3 medication(s) that are the same as other medications prescribed for you. Read the directions carefully, and ask your doctor or other care provider to review them with you.         Where to get your medicines      These medications were sent to [facility] [address] [facility] [address]  [address] [place] [address] [address] [place] [address]    Hours:  TRANSPLANT PHONE NUMBER [phone] Phone:  [phone]     iohexol 140 MG/ML Soln solution                Primary Care Provider Office Phone # Fax #    [facility] [phone]       [address] [facility] [address]        Equal Access to Services     KIMBERLEE RICHARDS AH: Hadii michael hitchcock hadasho Soomaali, waaxda luqadaha, qaybta kaalmada adeegyada, moy resendezin haypino buitrago. So [facility] [phone].    ATENCIÓN: Si habla español, tiene a roach disposición servicios gratuitos de asistencia lingüística. Llame al [phone].    We comply with applicable federal civil rights laws and [place] laws. We do not discriminate on the basis of race, color, national origin, age, disability, sex, sexual orientation, or gender identity.            Thank you!     Thank you for choosing [facility]  for your care. Our goal is always to provide you with excellent care. Hearing back from our patients is one way we can continue to improve our  services. Please take a few minutes to complete the written survey that you may receive in the mail after your visit with us. Thank you!             Your Updated Medication List - Protect others around you: Learn how to safely use, store and throw away your medicines at www.disposemymeds.org.          This list is accurate as of 6/20/18 11:59 PM.  Always use your most recent med list.                   Brand Name Dispense Instructions for use Diagnosis    aspirin 81 MG chewable tablet      81 mg        atorvastatin 20 MG tablet    LIPITOR     20 mg        Blood Glucose Monitor System w/Device Kit      1 Kit        BLOOD GLUCOSE TEST STRIPS Strp      1 strip        docusate sodium 100 MG capsule    COLACE     200 mg        HYDROcodone-acetaminophen 5-325 MG per tablet    NORCO     2 po bid prn        insulin aspart 100 UNIT/ML injection    NovoLOG PEN     4-15 Units        insulin glargine 100 UNIT/ML injection    LANTUS     20 Units        * iohexol 140 MG/ML Soln solution    OMNIPAQUE    50 mL    Mix entire bottle (50ml) of contast with 600ml (20 ounces) of water and drink half 2 hrs prior to CT scan and half 1 hr prior to scan    Endometrial cancer (H)       * iohexol 140 MG/ML Soln solution    OMNIPAQUE    50 mL    Mix entire bottle (50ml) of contast with 600ml (20 ounces) of water and drink half 2 hrs prior to CT scan and half 1 hr prior to scan    Endometrial cancer (H)       * iohexol 140 MG/ML Soln solution    OMNIPAQUE    50 mL    Take 50 mLs by mouth once for 1 dose    Endometrial cancer (H)       Lancets Misc      1 each        LORazepam 0.5 MG tablet    ATIVAN    2 tablet    Take 1 tablet (0.5 mg) by mouth daily as needed for anxiety (Take one tablet now. Repeat in 20 minutes if needed)    Anxiety       losartan-hydrochlorothiazide 100-12.5 MG per tablet    HYZAAR     1 tablet        metFORMIN 1000 MG tablet    GLUCOPHAGE     1,000 mg        naproxen sodium 220 MG tablet    ANAPROX     220 mg         "OLANZapine 10 MG tablet    zyPREXA          ondansetron 4 MG ODT tab    ZOFRAN-ODT     DISSOLVE ONE TABLET BY MOUTH EVERY 4 HOURS AS NEEDED FOR NAUSEA        Pen Needles 5/16\" 30G X 8 MM Misc      Use as directed        prochlorperazine 10 MG tablet    COMPAZINE     10 mg        promethazine-codeine 6.25-10 MG/5ML syrup    PHENERGAN with codeine     10 mLs        * Notice:  This list has 3 medication(s) that are the same as other medications prescribed for you. Read the directions carefully, and ask your doctor or other care provider to review them with you.      "

## 2018-06-20 NOTE — NURSING NOTE
"Oncology Rooming Note    June 20, 2018 7:57 AM   Manda Santacruz is a 57 year old female who presents for:    Chief Complaint   Patient presents with     Oncology Clinic Visit     Return Endometrial Ca     Initial Vitals: /83  Pulse 93  Temp 97.5  F (36.4  C) (Oral)  Resp 16  Ht 1.594 m (5' 2.75\")  Wt 84.8 kg (187 lb)  SpO2 98%  BMI 33.39 kg/m2 Estimated body mass index is 33.39 kg/(m^2) as calculated from the following:    Height as of this encounter: 1.594 m (5' 2.75\").    Weight as of this encounter: 84.8 kg (187 lb). Body surface area is 1.94 meters squared.  No Pain (0) Comment: Data Unavailable   No LMP recorded.  Allergies reviewed: Yes  Medications reviewed: Yes    Medications: Medication refills not needed today.  Pharmacy name entered into EPIC: Data Unavailable    Clinical concerns: CT results     6 minutes for nursing intake (face to face time)     Lisa Moseley CMA              "

## 2018-06-27 ENCOUNTER — TELEPHONE (OUTPATIENT)
Dept: ONCOLOGY | Facility: CLINIC | Age: 58
End: 2018-06-27

## 2018-06-27 ENCOUNTER — NURSE TRIAGE (OUTPATIENT)
Dept: NURSING | Facility: CLINIC | Age: 58
End: 2018-06-27

## 2018-06-28 ENCOUNTER — TRANSFERRED RECORDS (OUTPATIENT)
Dept: HEALTH INFORMATION MANAGEMENT | Facility: CLINIC | Age: 58
End: 2018-06-28

## 2018-06-28 ENCOUNTER — NURSE TRIAGE (OUTPATIENT)
Dept: NURSING | Facility: CLINIC | Age: 58
End: 2018-06-28

## 2018-06-28 NOTE — TELEPHONE ENCOUNTER
Manda's daughter Chayo called from the \[facility]".  Her mother is in the ER and was dx with a perforated bowel.  Daughter is stating that Dr. Rm is the primary ONC at the [facility] and he has said that in any emergent situation they are to get her mother to the [facility] and she would like assistance with this.    Paged on call provider for [facility] GYN/ONC to speak to me at [facility].  A rounding pager was paged through page  at 10:26 pm and again at 10:38 pm.    I called daughter back to instruct her to have [place] ER provider call [facility] pateint placement at [phone] and request that patient be transferred to [facility] with pre-existing condition and instructions that she needs to be seen there.  Chayo asked that I call her mother's sister Maylin to give them message, as Chayo was already driving down to [place].    I called number for Maylin at [phone].  Maylin's daughter Yvette answered phone, I started to give her instructions to have ER provider call patient placement and Yvette handed the phone to ER provider Dr. Amado.  Dr. Amado informs me he has tried to get a provider to accept the transfer for Manda at the [facility] and he is being told that there are no beds.  Family insists that she needs to be seen at the [facility].  ER providercan be reached at [phone] and said if a provider will accept her have them call me and I will transfer her.    Family insists that Dr. Rm be notified.  I have not heard back from resident for [facility] GYN/ONC so I have paged staff Dr Rea via [facility] page  to speak to me at [facility].  Page sent at 10:55 pm. Dr. Rea called back and offered to speak to family directly to explain situation. Dr. Rea has been working on this transfer for past 3 hours. Michelle Yvette was contacted as she is with Manda at the hospital.    Dr. Rea advised family that Dr. Rm's partners have been aware of the situation for the past 3 hours and  they are working on a transfer.  At this time the bowel is the issue, not any type of Cancer surgery, so a qualified general surgeon or colorectal surgeon can do surgery if necessary.  She may not need surgery, that is still being assessed to determine if antibiotics can manage possible infection or if surgery is needed.    In short, if Manda is transferred to either [facility] or [facility] [place] it is being done with the blessing of Dr. Rm's care team and knowledge and they will be monitoring situation closely.  Manda is on the short list for [facility] to transfer her when bed is available.      Yvette was going to relay to rest of family, I attempted to call daughter Chayo, unable to get through to her.  Contacted Yvette and told her I wasn't able to reach Chayo, she will follow up wither.    Family appears to understand plan of care agrees with plan.    Pati Pathak RN  [facility]

## 2018-06-28 NOTE — TELEPHONE ENCOUNTER
Called by [facility] ED for possible patient transfer.    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  Last seen at the [facility] 1 week ago, given the significant amount of residual disease seen on interval CT, recommendation was for additional neoadjuvant chemotherapy.  Surgical debulking is not recommended as her disease remains unresectable at this time.    She is afebrile in the ED today, with a WBC of 1.4k (), but CT shows a likely jejunal perforation (likely secondary to shrinking tumor previously noted at that location) with two small abscesses. She is slightly hypotensive but has remained stable for several hours after fluid resuscitation and initiation of IV Zosyn.  She is deemed stable for transport per ED provider.  Transfer to a higher level of care for evaluation by colorectal surgery and for possible drainage of abscesses versus surgical repair was recommended by the GYN ONC team. The [facility] is completely full and therefore is unable to accept the transfer at this time. Upon further discussion with the patient placement teams, [facility] was deemed an appropriate location for transfer given the higher level of care vs. [facility], continuity of care within the [place] system, and available stepdown/ICU beds.    On discussion with covering colorectal surgeon on call (from [facility]), she will be admitted to the hospitalist service with colorectal consulting.  We appreciate colorectal recommendations regarding conservative management with IR drains vs surgical management.  We would not debulk this patient at this time, and surgical management would be limited to management of her perforation per colorectal surgery.    We suggest placement in stepdown at this time given her suspected intra-abdominal infection, and potential risk for sepsis.  Agree with  Zosyn for broad spectrum coverage.  We would be happy to accept a transfer to the [facility] as soon as a bed is available.    Please page with any questions. GYN ONC 24/7 covering resident pager: [phone]    Adilene Rea MD  GYN ONC Fellow    D/W Dr. Lopez      ADDENDUM @ 23:15:  Called by RN from patient line as patient's family was unaware of plan.  Spoke with patient's relative, Yvette to review plan for transfer. Given suspected bowel perforation, will need general surgery/colorectal evaluation to determine drain vs surgical management.  Again, no plans for GYN ONC surgical management at this time as her metastatic uterine cancer is not resectable. Per relative, plan is now for transfer to [facility].

## 2018-06-28 NOTE — TELEPHONE ENCOUNTER
Daughter Chayo is calling back and is upset with her mom being at [facility] ER department. Daughter states Dr. Rm needs to be contacted about this matter and her mother needs to be moved to the [facility]. Triager advised caller per other Triager's noted that there are no beds available and that is why her mother is at [facility], and when a bed opens up. Caller was upset and hung up.

## 2018-06-28 NOTE — TELEPHONE ENCOUNTER
Additional Information    Negative: Caller is angry or rude (e.g., hangs up, verbally abusive, yelling)    Negative: Caller hangs up    Negative: Caller has already spoken with the PCP and has no further questions.    Negative: Caller has already spoken with another triager and has no further questions.    Caller has already spoken with another triager or PCP AND has further questions AND triager able to answer questions.    Protocols used: NO CONTACT OR DUPLICATE CONTACT CALL-ADULT-  Caller states she is calling back after speaking with another triager regarding her mother being airlifted to an [facility] hospital. Daughter wants to know [facility] other [facility]. Triager gave caller the number to the hospital [facility] to call and see which hospital mother was going to be admitted to.

## 2018-06-28 NOTE — TELEPHONE ENCOUNTER
Called by [facility] ED for possible patient transfer.    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  Last seen at the [facility] 1 week ago, given the significant amount of residual disease seen on interval CT, recommendation was for additional neoadjuvant chemotherapy.  Surgical debulking is not recommended as her disease remains unresectable at this time.    She is afebrile in the ED today, with a WBC of 1.4k (), but CT shows a likely jejunal perforation (likely secondary to shrinking tumor previously noted at that location) with two small abscesses. She is slightly hypotensive but has remained stable for several hours after fluid resuscitation and initiation of IV Zosyn.  She is deemed stable for transport per ED provider.  Transfer to a higher level of care for evaluation by colorectal surgery and for possible drainage of abscesses versus surgical repair was recommended by the GYN ONC team. The [facility] is completely full and therefore is unable to accept the transfer at this time. Upon further discussion with the patient placement teams, [facility] was deemed an appropriate location for transfer given the higher level of care vs. [facility], continuity of care within the [facility] system, and available stepdown/ICU beds.    On discussion with covering colorectal surgeon on call (from [facility]), she will be admitted to the hospitalist service with colorectal consulting.  We appreciate colorectal recommendations regarding conservative management with IR drains vs surgical management.  We would not debulk this patient at this time, and surgical management would be limited to management of her perforation per colorectal surgery.    We suggest placement in stepdown at this time given her suspected intra-abdominal infection, and potential risk for sepsis.  Agree with  Zosyn for broad spectrum coverage.  We would be happy to accept a transfer to the [facility] as soon as a bed is available.    Please page with any questions. GYN ONC 24/7 covering resident pager: [phone]    Adilene Rea MD  GYN ONC Fellow    D/W Dr. Lopez

## 2018-06-29 ENCOUNTER — HOSPITAL ENCOUNTER (INPATIENT)
Facility: CLINIC | Age: 58
LOS: 3 days | Discharge: HOME | DRG: 755 | End: 2018-07-02
Attending: OBSTETRICS & GYNECOLOGY | Admitting: OBSTETRICS & GYNECOLOGY
Payer: COMMERCIAL

## 2018-06-29 DIAGNOSIS — E11.8 TYPE 2 DIABETES MELLITUS WITH COMPLICATION, UNSPECIFIED LONG TERM INSULIN USE STATUS: ICD-10-CM

## 2018-06-29 DIAGNOSIS — Z98.890 S/P EXPLORATORY LAPAROTOMY: Primary | ICD-10-CM

## 2018-06-29 PROBLEM — K63.1 BOWEL PERFORATION (H): Status: ACTIVE | Noted: 2018-06-29

## 2018-06-29 LAB — GLUCOSE BLDC GLUCOMTR-MCNC: 100 MG/DL (ref 70–99)

## 2018-06-29 PROCEDURE — 25000128 H RX IP 250 OP 636: Performed by: STUDENT IN AN ORGANIZED HEALTH CARE EDUCATION/TRAINING PROGRAM

## 2018-06-29 PROCEDURE — 00000146 ZZHCL STATISTIC GLUCOSE BY METER IP

## 2018-06-29 PROCEDURE — 12000001 ZZH R&B MED SURG/OB UMMC

## 2018-06-29 RX ORDER — PROCHLORPERAZINE MALEATE 5 MG
10 TABLET ORAL EVERY 6 HOURS PRN
Status: DISCONTINUED | OUTPATIENT
Start: 2018-06-29 | End: 2018-07-02 | Stop reason: HOSPADM

## 2018-06-29 RX ORDER — NALOXONE HYDROCHLORIDE 0.4 MG/ML
.1-.4 INJECTION, SOLUTION INTRAMUSCULAR; INTRAVENOUS; SUBCUTANEOUS
Status: DISCONTINUED | OUTPATIENT
Start: 2018-06-29 | End: 2018-07-02 | Stop reason: HOSPADM

## 2018-06-29 RX ORDER — DEXTROSE MONOHYDRATE 25 G/50ML
25-50 INJECTION, SOLUTION INTRAVENOUS
Status: DISCONTINUED | OUTPATIENT
Start: 2018-06-29 | End: 2018-06-30

## 2018-06-29 RX ORDER — ONDANSETRON 2 MG/ML
4 INJECTION INTRAMUSCULAR; INTRAVENOUS EVERY 6 HOURS PRN
Status: DISCONTINUED | OUTPATIENT
Start: 2018-06-29 | End: 2018-07-02 | Stop reason: HOSPADM

## 2018-06-29 RX ORDER — OXYCODONE HYDROCHLORIDE 5 MG/1
5-10 TABLET ORAL
Status: DISCONTINUED | OUTPATIENT
Start: 2018-06-29 | End: 2018-07-02 | Stop reason: HOSPADM

## 2018-06-29 RX ORDER — NICOTINE POLACRILEX 4 MG
15-30 LOZENGE BUCCAL
Status: DISCONTINUED | OUTPATIENT
Start: 2018-06-29 | End: 2018-06-30

## 2018-06-29 RX ORDER — PROCHLORPERAZINE 25 MG
25 SUPPOSITORY, RECTAL RECTAL EVERY 12 HOURS PRN
Status: DISCONTINUED | OUTPATIENT
Start: 2018-06-29 | End: 2018-07-02 | Stop reason: HOSPADM

## 2018-06-29 RX ORDER — MAGNESIUM SULFATE HEPTAHYDRATE 40 MG/ML
4 INJECTION, SOLUTION INTRAVENOUS EVERY 4 HOURS PRN
Status: DISCONTINUED | OUTPATIENT
Start: 2018-06-29 | End: 2018-07-01

## 2018-06-29 RX ORDER — POTASSIUM CHLORIDE 750 MG/1
20-40 TABLET, EXTENDED RELEASE ORAL
Status: DISCONTINUED | OUTPATIENT
Start: 2018-06-29 | End: 2018-07-01

## 2018-06-29 RX ORDER — MAGNESIUM SULFATE HEPTAHYDRATE 40 MG/ML
2 INJECTION, SOLUTION INTRAVENOUS DAILY PRN
Status: DISCONTINUED | OUTPATIENT
Start: 2018-06-29 | End: 2018-07-01

## 2018-06-29 RX ORDER — POTASSIUM CHLORIDE 29.8 MG/ML
20 INJECTION INTRAVENOUS
Status: DISCONTINUED | OUTPATIENT
Start: 2018-06-29 | End: 2018-06-29

## 2018-06-29 RX ORDER — HYDROMORPHONE HYDROCHLORIDE 1 MG/ML
.3-.5 INJECTION, SOLUTION INTRAMUSCULAR; INTRAVENOUS; SUBCUTANEOUS
Status: DISCONTINUED | OUTPATIENT
Start: 2018-06-29 | End: 2018-07-02 | Stop reason: HOSPADM

## 2018-06-29 RX ORDER — ACETAMINOPHEN 325 MG/1
650 TABLET ORAL EVERY 4 HOURS PRN
Status: DISCONTINUED | OUTPATIENT
Start: 2018-06-29 | End: 2018-07-02 | Stop reason: HOSPADM

## 2018-06-29 RX ORDER — POTASSIUM CHLORIDE 7.45 MG/ML
10 INJECTION INTRAVENOUS
Status: DISCONTINUED | OUTPATIENT
Start: 2018-06-29 | End: 2018-07-01

## 2018-06-29 RX ORDER — SODIUM CHLORIDE 9 MG/ML
INJECTION, SOLUTION INTRAVENOUS CONTINUOUS
Status: DISCONTINUED | OUTPATIENT
Start: 2018-06-29 | End: 2018-06-30

## 2018-06-29 RX ORDER — ONDANSETRON 4 MG/1
4 TABLET, ORALLY DISINTEGRATING ORAL EVERY 6 HOURS PRN
Status: DISCONTINUED | OUTPATIENT
Start: 2018-06-29 | End: 2018-07-02 | Stop reason: HOSPADM

## 2018-06-29 RX ORDER — ACETAMINOPHEN 325 MG/1
650 TABLET ORAL EVERY 4 HOURS PRN
Status: DISCONTINUED | OUTPATIENT
Start: 2018-06-29 | End: 2018-06-29

## 2018-06-29 RX ORDER — POTASSIUM CHLORIDE 1.5 G/1.58G
20-40 POWDER, FOR SOLUTION ORAL
Status: DISCONTINUED | OUTPATIENT
Start: 2018-06-29 | End: 2018-07-01

## 2018-06-29 RX ORDER — POTASSIUM CL/LIDO/0.9 % NACL 10MEQ/0.1L
10 INTRAVENOUS SOLUTION, PIGGYBACK (ML) INTRAVENOUS
Status: DISCONTINUED | OUTPATIENT
Start: 2018-06-29 | End: 2018-07-01

## 2018-06-29 RX ORDER — LIDOCAINE 40 MG/G
CREAM TOPICAL
Status: DISCONTINUED | OUTPATIENT
Start: 2018-06-29 | End: 2018-07-02 | Stop reason: HOSPADM

## 2018-06-29 RX ORDER — OXYCODONE HYDROCHLORIDE 5 MG/1
5-10 TABLET ORAL
Status: DISCONTINUED | OUTPATIENT
Start: 2018-06-29 | End: 2018-06-29

## 2018-06-29 RX ORDER — IBUPROFEN 600 MG/1
600 TABLET, FILM COATED ORAL EVERY 6 HOURS PRN
Status: DISCONTINUED | OUTPATIENT
Start: 2018-06-29 | End: 2018-06-29

## 2018-06-29 RX ADMIN — Medication 0.3 MG: at 21:20

## 2018-06-29 RX ADMIN — SODIUM CHLORIDE: 9 INJECTION, SOLUTION INTRAVENOUS at 21:19

## 2018-06-29 NOTE — H&P
Gynecology/Oncology H&P    CC: Bowel perforation s/p repair    HPI: Manda Santacruz is a 57 year old with Stave IV serous uterine cancer who [place] [facility] [place] is POD#1 from jejunal perforation repair. She initially presented to the ED in [place] on 6/27 with increased abdominal pain. CT scan diagnosed SBO with jejunal perforation and associated abscess. Due to lack of bed availability at [facility], the [place] [facility] [place]. On 6/28/18 she underwent XL with extensive ALIDA and repair of proximal jejunal perforation. She was admitted to the SICU and treated with IV Zosyn for concern for infection and once she was stable was transferred here for post-operative care. Upon arrival to [facility] patient states that she has little pain and was using [place] [facility] [place] She reports ambulating today from bed to chair. She is not passing flatus or BM. Voiding without difficulty. She is comfortable now, denies fever, chills, nausea, lightheaded or dizziness. Patient is anxious to go home.     Cancer history:   2/8/2018: Presented to ED w/abdominal pain. CT scan revealed enlarged uterus. Embx: high-grade serous uterine cancer.  3/19/2018: Visit with Dr. Rm. Large protruding mass from the cervix.  3/19/2018 CT C/A/P:  IMPRESSION:   1. Markedly thickened endometrial stripe with masslike protrusion into  the vaginal vault, consistent with known endometrial carcinoma.  2. Ill-defined peripherally enhancing masses in the left lower  quadrant abutting small bowel, favored to represent metastatic  deposits.  3. Hydrosalpinx/pyosalpinx along with rim-enhancing pelvic fluid  collections with surrounding inflammatory stranding. Findings  suspicious for tubo-ovarian abscess, although superimposed metastatic  deposits are not excluded. Largest pelvic collections is located  anterior to the uterus with significant mass effect on the bladder.  These collections abut and possibly  involve of sigmoid colonic and  ileal bowel loops.  4. Indeterminate retroperitoneal lymph nodes.  5. Innumerable bilateral renal cysts, some of which appear at least  mildly complex. Consider dedicated MRI for further evaluation.  6. Punctate nonobstructing left renal calculi.  7. No evidence for metastatic disease in the chest.    3/28/2018: Follow up with Dr. Rm. Plan for neoadjuvant chemo x3 cycles followed by reimaging.     4/16/2018: C1D1 Carbo/Taxol  5/7/2018: C2D1 Carbo/Taxol  5/29/2018: C3D1 Carbo/Taxol    6/19/2018: CT scan  IMPRESSION: In this patient with a history of endometrial carcinoma  there is evidence for response to treatment;     1. Decreased size of peripherally enhancing fluid collection anterior  to the uterus as well as decreased fluid dilatation of the fallopian  tubes. Unchanged masslike protrusion into the vaginal vault.   2. Decreased size of peritoneal metastatic lesion adjacent to a loop  of jejunum in the left upper the quadrant.  3. Relatively stable appearance of globular uterus with cystic  lesions.  4. Multiple cystic renal lesions, some which are indeterminate as well  as cortical scarring on the left secondary to prior infectious or  vascular insult.    6/20/2018: Visit with Dr. Rm. Plan for cycles 4-6 of Carbo/Taxol due good treatment response but significant amount of residual disease and follow-up in 3 months for possible debulking surgery at that time.     ROS:  Gen: No fevers/chills  HEENT: no changes in vision  Neuro: No dizziness.  CV: No CP  Pulm: No SOB or cough  GI: No N/V.  constipation  : No dysuria, frequency, urgency.  No bothersome vaginal discharge  Endocrine: Type 2DM, Takes 20u Lantis at bedtime and sliding scale Novolog if BG >150. Not taking metformin  Psych: No anxiety/depression    PMH:  DM  HTN: Not on medications  H/o renal calculi    PSHx:  Ureteral stenting  Lithotripsy  Percutaneous nephrostolithotomy     Meds:    No current  facility-administered medications on file prior to encounter.   Current Outpatient Prescriptions on File Prior to Encounter:  aspirin 81 MG chewable tablet 81 mg   atorvastatin (LIPITOR) 20 MG tablet 20 mg   Blood Glucose Monitoring Suppl (BLOOD GLUCOSE MONITOR SYSTEM) W/DEVICE KIT 1 Kit   docusate sodium (COLACE) 100 MG capsule 200 mg   HYDROcodone-acetaminophen (NORCO) 5-325 MG per tablet 2 po bid prn   insulin aspart (NOVOLOG PEN) 100 UNIT/ML injection 4-15 Units   insulin glargine (LANTUS) 100 UNIT/ML injection 20 Units   LORazepam (ATIVAN) 0.5 MG tablet Take 1 tablet (0.5 mg) by mouth daily as needed for anxiety (Take one tablet now. Repeat in 20 minutes if needed) (Patient not taking: Reported on 6/20/2018)   losartan-hydrochlorothiazide (HYZAAR) 100-12.5 MG per tablet 1 tablet   metFORMIN (GLUCOPHAGE) 1000 MG tablet 1,000 mg   naproxen sodium (ANAPROX) 220 MG tablet 220 mg   OLANZapine (ZYPREXA) 10 MG tablet    ondansetron (ZOFRAN-ODT) 4 MG ODT tab DISSOLVE ONE TABLET BY MOUTH EVERY 4 HOURS AS NEEDED FOR NAUSEA   prochlorperazine (COMPAZINE) 10 MG tablet 10 mg   promethazine-codeine (PHENERGAN WITH CODEINE) 6.25-10 MG/5ML syrup 10 mLs        Allergies:   No Known Allergies     FamHx:  No family history on file.    SocialHx:  Social History     Social History     Marital status: Single     Spouse name: N/A     Number of children: N/A     Years of education: N/A     Social History Main Topics     Smoking status: Never Smoker     Smokeless tobacco: Never Used     Alcohol use Not on file     Drug use: Not on file     Sexual activity: Not on file     Other Topics Concern     Not on file     Social History Narrative       Vitals:  Vitals:    06/29/18 2011   BP: 125/77   BP Location: Other (Comment)   Pulse: 100   Resp: 18   Temp: 97.8  F (36.6  C)   TempSrc: Oral   SpO2: 99%       PEx:  Gen: No distress, comfortable; lines present include Left PICC line  HEENT: Normocephalic, atraumatic; NG tube in place  Neuro:  PERRL, EOMI; grossly normal motor movement bilaterally  CV: RRR, nl S1/S2, no murmurs/clicks/gallops, peripheral pulses 2+ bilaterally  Pulm: CTAB, no wheezing/rhonchi/crackles; non-labored breathing  Abd: Soft, appropriately tender, non-distended; BS diminished, vertical midline incision with staples, CDI  Pelvic: deferred  Ext: Non-tender, no erythema; trace edema  Psych: Appropriate insight/judgment    Labs:  No results found for this or any previous visit (from the past 24 hour(s)).     OSH Labs:   Hgb  8.5  WBC  1.51  ANC  0.78  K  3.6  Na  133   Cr 0.54  Ca  8.4  Mg  1.9  Phos  2.9    Imaging:  CT abdomen pelvis 6/27/18  IMPRESSION:    1. Fatty liver  2. inflammatory changes involving the jejunum in the left abdomen upper pelvic area with associated perforation and abscess collection with multiple areas of free air present. There is also some peritoneal inflammatory changes in a jesús mesentery and prominent phase recta. This also some fluid tracking along the left pericolonic area where the left colon shows significant inflammation to the level of the sigmoid. Inflammatory changes also appear to cross the midline just below the level of the renal arteries and can be seen extending along the right ureter and in the deep right pelvis. Most likely findings are related to a chemotherapy neutropenic colitis or enteritis given the patient's clinical history.  3. Known endometrial carcinoma in with increasing low density lesion noted in the region of the uterine cervical junction  4. Atherosclerotic disease no evidence for large vessel thrombosis-negative portal system  5. Mild to moderate right hydronephrosis with a right ureteritis is is most likely secondary to inflammatory changes in the abdomen and pelvis for most of these are to the left of midline not commented upon previously there are some inflammatory changes sitting just to the right of the sigmoid colon is well but not involving the colon.  6. also not  mentioned the body of the report is free fluid present in the anterior deep pelvis with enhancement I suspect representing an additional area of a developing abscess measuring 11.7 cm in length by 1.9 cm best seen on image 127/166  6. 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    Active Problem list:  Stage IV Endometrial cancer  Small bowel obstruction  Jejunal perforation s/p repair  HTN  Type 2 DM  Hydronephrosis, Cr normal    Plan:    Disease: Stage IV endometrial cancer, s/p 3 cycles of neoadjuvant Carbo/Taxol (4/2018-6/2018.) CT scan 6/20/2018 with residual disease. Plan for continued neoadjuvent therapy.   FEN: NPO, NG tube, low intermittent suction. IVF NS 75 ml/hour   Pain: Dilaudid IV, Tylenol & Roxicodone per NG PRN   Heme:Pancytopenia due to chemotherapy, will monitor  CV: HTN. Not on medications. Home ASA, atorvastatin held.  Resp: NI  GI: PRN antiemetics, remove NG when +flatus  : Mild to moderate right hydronephrosis on CT scan. Cr 0.54.    MSK: NI  Neuro/Psych: NI  Endocrine: Type 2 DM. Home Novolog and Lantis held. Medium SSI.   ID: s/p Zosyn at OSH, Neutropenic precautions  PPx: SCDs   Drains/Lines: Left PICC, NG  Disposition: To home when tolerating PO and regain bowel function    Staffed with Zenas Chang, MD Alicia Myhre, DO  OB/GYN PGY-2    I have discussed this patient's assessment and plan with Dr. Hoskins. Agree with note above.    Annabel Lopez MD  6/30/2018  11:54 AM

## 2018-06-29 NOTE — IP AVS SNAPSHOT
MRN:[id number]                      After Visit Summary   6/29/2018    Manda Santacruz    MRN: [id number]           Thank you!     Thank you for choosing [facility] for your care. Our goal is always to provide you with excellent care. Hearing back from our patients is one way we can continue to improve our services. Please take a few minutes to complete the written survey that you may receive in the mail after you visit with us. Thank you!        Patient Information     Date Of Birth          1960        Designated Caregiver       Most Recent Value    Caregiver    Will someone help with your care after discharge? yes    Name of designated caregiver Chayo Santacruz    Phone number of caregiver [phone]    Caregiver address She will stay at patient's home      About your hospital stay     You were admitted on:  June 29, 2018 You last received care in the:  [address] [place] [address]    You were discharged on:  July 2, 2018        Reason for your hospital stay       Surgery                  Who to Call     For medical emergencies, please call 911.  For non-urgent questions about your medical care, please call your primary care provider or clinic, [phone]          Attending Provider     Provider Annabel Garcia MD OB/Gyn       Primary Care Provider Office Phone # Fax #    Devonte Figueroa [facility] [phone]       When to contact your care team       GENERAL POST-OPERATIVE  PATIENT INSTRUCTIONS  FOLLOW-UP:    Call Surgeon if you have:  Temperature greater than 100.4  Persistent nausea and vomiting  Severe uncontrolled pain  Redness, tenderness, or signs of infection (pain, swelling, redness, odor or green/yellow discharge around the site)  Difficulty breathing, headache or visual disturbances  Hives  Persistent dizziness or light-headedness  Extreme fatigue  Any other questions or concerns you may have after discharge    In an emergency, call 911 or go to  an Emergency Department at a nearby hospital       WOUND CARE INSTRUCTIONS:  Keep a dry clean dressing on the wound if there is drainage. If you had a bandage initially, it may be removed after 24 hours.  Once the wound has quit draining you may leave it open to air.  If clothing rubs against the wound or causes irritation and the wound is not draining you may cover it with a dry dressing during the daytime.  Try to keep the wound dry and avoid ointments on the wound unless directed to do so.  If the wound becomes bright red and painful or starts to drain infected material that is not clear, please contact your physician immediately.    1.  You may shower 24 hrs after surgery   2.  No soaking in the tub for 4 weeks       DIET:  There are no dietary restrictions.  You may eat any foods that you can tolerate unless instructed otherwise.  It is a good idea to eat a high fiber diet and take in plenty of fluids to prevent constipation.  If you become constipated, please follow the instructions below.    ACTIVITY:  You are encouraged to cough, deep breath and use your incentive spirometer if you were given one, every 15-30 minutes when awake.  This will help prevent respiratory complications and low grade fevers post-operatively.  You may want to hug a pillow when coughing and sneezing to add additional support to the surgical area, if you had abdominal surgery, which will decrease pain during these times.      1.  No heavy lifting >20lbs or strenuous exercise for six-eight weeks.  No exercise in which you are using core muscles (yoga, pilates, swimming, weight lifting)  2.  You may walk as much as you wish.  You are encouraged to increase your activity each day after surgery.  Stairs are okay.   3.  Nothing per vagina for eight weeks.  No tampons, no intercourse, no douching.  You can expect some light vaginal spotting and discharge for up to six weeks.  If bleeding becomes heavy, please contact the office.      MEDICATIONS:  Try to take narcotic medications and anti-inflammatory medications, such as tylenol, ibuprofen, naprosyn, etc., with food.  This will minimize stomach upset from the medication.  Should you develop nausea and vomiting from the pain medication, or develop a rash, please discontinue the medication and contact your physician.  You should not drive, make important decisions, or operate machinery when taking narcotic pain medication.    OTHER:  Patients are often constipated.  The patient should continue to take stool softeners (for example, Senokot-S) (unless diarrhea develops) and consume adequate amounts of water.  If the patient remains constipated or unable to pass stool, please try one or all of the following measures:  1.  Milk of Magnesia 30cc twice a day as needed by mouth  2.  Metamucil 2 tablespoons in 12 ounces of fluid  3.  Dulcolax oral or suppositories  4.  Prunes or prune juice  5.  Miralax daily      QUESTIONS:  Please feel free to call your physician or the hospital  if you have any questions, and they will be glad to assist you.                  After Care Instructions     Activity       Your activity upon discharge: activity as tolerated            Diet       Follow this diet upon discharge:       Regular Diet Adult                  Follow-up Appointments     Adult [facility] Follow-up and recommended labs and tests       Monitor your glucose prior to meals and at bedtime. Please use sliding scale insulin.   Follow up with your primary care provider this week for hospital follow up and discuss resuming your metformin and lantus  Your staples will need to be removed in 10-14 days post operatively  Please follow up with Dr Rm in 2 weeks  Please call [phone] for questions or concerns    Appointments on [place] and/or [facility] (with [facility] provider or service). Call [phone] if you haven't heard regarding these appointments within 7 days of  "discharge.                  Pending Results     No orders found from 2018 to 2018.            Statement of Approval     Ordered          18 0829  I have reviewed and agree with all the recommendations and orders detailed in this document.  EFFECTIVE NOW     Approved and electronically signed by:  Kenia Velez APRN CNP             Admission Information     Date & Time Provider Department Dept. Phone    2018 Annabel Lopez MD [address] [facility] [place] [phone]      Your Vitals Were     Blood Pressure Pulse Temperature Respirations Weight Pulse Oximetry    153/89 (BP Location: Right arm) 99 99.8  F (37.7  C) (Oral) 16 87.5 kg (192 lb 12.8 oz) 96%    BMI (Body Mass Index)                   34.43 kg/m2           [employer] Information     ITC lets you send messages to your doctor, view your test results, renew your prescriptions, schedule appointments and more. To sign up, go to [email or web address] . Click on \"Log in\" on the left side of the screen, which will take you to the Welcome page. Then click on \"Sign up Now\" on the right side of the page.     You will be asked to enter the access code listed below, as well as some personal information. Please follow the directions to create your username and password.     Your access code is: [email or web address]  Expires: 2018  6:30 AM     Your access code will  in 90 days. If you need help or a new code, please call your [place] [facility] clinic or [phone].        Care EveryWhere ID     This is your Care EveryWhere ID. This could be used by other organizations to access your [facility] medical records  [id number]        Equal Access to Services     [facility] DistrictMELISSA AH: Hadii aad naldo hadleighann Someshaali, waaxda luqadaha, qaybta kaalmada aderafaelyada, moy aquino . So [facility] [phone].    ATENCIÓN: Si habla español, tiene a roach disposición servicios gratuitos de asistencia lingüística. Llame al [phone].    We comply " with applicable federal civil rights laws and [place] laws. We do not discriminate on the basis of race, color, national origin, age, disability, sex, sexual orientation, or gender identity.               Review of your medicines      START taking        Dose / Directions    acetaminophen 325 MG tablet   Commonly known as:  TYLENOL   Used for:  S/P exploratory laparotomy        Dose:  650 mg   Take 2 tablets (650 mg) by mouth every 4 hours as needed for mild pain   Quantity:  100 tablet   Refills:  0       enoxaparin 40 MG/0.4ML injection   Commonly known as:  LOVENOX   Used for:  S/P exploratory laparotomy        Dose:  40 mg   Inject 0.4 mLs (40 mg) Subcutaneous every 24 hours   Quantity:  24 Syringe   Refills:  0       oxyCODONE IR 5 MG tablet   Commonly known as:  ROXICODONE   Used for:  S/P exploratory laparotomy        Dose:  5-10 mg   Take 1-2 tablets (5-10 mg) by mouth every 4 hours as needed for moderate to severe pain   Quantity:  30 tablet   Refills:  0       polyethylene glycol powder   Commonly known as:  MIRALAX   Used for:  S/P exploratory laparotomy        Dose:  1 capful   Take 17 g (1 capful) by mouth daily as needed for constipation   Quantity:  510 g   Refills:  0         CONTINUE these medicines which may have CHANGED, or have new prescriptions. If we are uncertain of the size of tablets/capsules you have at home, strength may be listed as something that might have changed.        Dose / Directions    insulin glargine 100 UNIT/ML injection   Commonly known as:  LANTUS   This may have changed:    - how to take this  - when to take this  - additional instructions   Used for:  Type 2 diabetes mellitus with complication, unspecified long term insulin use status (H)        Dose:  20 Units   Inject 20 Units Subcutaneous At Bedtime HOLD UNTIL YOU FOLLOW UP WITH YOUR PCP   Refills:  0       metFORMIN 1000 MG tablet   Commonly known as:  GLUCOPHAGE   This may have changed:    - how to take this  -  "additional instructions   Used for:  Type 2 diabetes mellitus with complication, unspecified long term insulin use status (H)        Dose:  1000 mg   Take 1 tablet (1,000 mg) by mouth HOLD UNTIL YOU FOLLOW UP WITH YOUR PCP   Refills:  0         CONTINUE these medicines which have NOT CHANGED        Dose / Directions    aspirin 81 MG chewable tablet        Dose:  81 mg   81 mg   Refills:  0       atorvastatin 20 MG tablet   Commonly known as:  LIPITOR        Dose:  20 mg   20 mg   Refills:  0       Blood Glucose Monitor System w/Device Kit        1 Kit   Refills:  0       BLOOD GLUCOSE TEST STRIPS Strp        Dose:  1 strip   1 strip   Refills:  0       docusate sodium 100 MG capsule   Commonly known as:  COLACE        Dose:  200 mg   200 mg   Refills:  0       insulin aspart 100 UNIT/ML injection   Commonly known as:  NovoLOG PEN        Dose:  4-15 Units   4-15 Units   Refills:  0       Lancets Misc        Dose:  1 each   1 each   Refills:  0       LORazepam 0.5 MG tablet   Commonly known as:  ATIVAN   Used for:  Anxiety        Dose:  0.5 mg   Take 1 tablet (0.5 mg) by mouth daily as needed for anxiety (Take one tablet now. Repeat in 20 minutes if needed)   Quantity:  2 tablet   Refills:  0       losartan-hydrochlorothiazide 100-12.5 MG per tablet   Commonly known as:  HYZAAR        Dose:  1 tablet   1 tablet   Refills:  0       ondansetron 4 MG ODT tab   Commonly known as:  ZOFRAN-ODT        DISSOLVE ONE TABLET BY MOUTH EVERY 4 HOURS AS NEEDED FOR NAUSEA   Refills:  0       Pen Needles 5/16\" 30G X 8 MM Misc        Use as directed   Refills:  0       prochlorperazine 10 MG tablet   Commonly known as:  COMPAZINE        Dose:  10 mg   10 mg   Refills:  0         STOP taking     HYDROcodone-acetaminophen 5-325 MG per tablet   Commonly known as:  NORCO           naproxen sodium 220 MG tablet   Commonly known as:  ANAPROX           OLANZapine 10 MG tablet   Commonly known as:  zyPREXA           promethazine-codeine " 6.25-10 MG/5ML syrup   Commonly known as:  PHENERGAN with codeine                Where to get your medicines      These medications were sent to [facility] - [place], [place] - [address]  [address] [address]     Phone:  [phone]     acetaminophen 325 MG tablet    enoxaparin 40 MG/0.4ML injection    polyethylene glycol powder         Some of these will need a paper prescription and others can be bought over the counter. Ask your nurse if you have questions.     Bring a paper prescription for each of these medications     oxyCODONE IR 5 MG tablet                Protect others around you: Learn how to safely use, store and throw away your medicines at www.disposemymeds.org.        Information about OPIOIDS     PRESCRIPTION OPIOIDS: WHAT YOU NEED TO KNOW   We gave you an opioid (narcotic) pain medicine. It is important to manage your pain, but opioids are not always the best choice. You should first try all the other options your care team gave you. Take this medicine for as short a time (and as few doses) as possible.     These medicines have risks:    DO NOT drive when on new or higher doses of pain medicine. These medicines can affect your alertness and reaction times, and you could be arrested for driving under the influence (DUI). If you need to use opioids long-term, talk to your care team about driving.    DO NOT operate heave machinery    DO NOT do any other dangerous activities while taking these medicines.     DO NOT drink any alcohol while taking these medicines.      If the opioid prescribed includes acetaminophen, DO NOT take with any other medicines that contain acetaminophen. Read all labels carefully. Look for the word  acetaminophen  or  Tylenol.  Ask your pharmacist if you have questions or are unsure.    You can get addicted to pain medicines, especially if you have a history of addiction (chemical, alcohol or substance dependence). Talk to your  care team about ways to reduce this risk.    Store your pills in a secure place, locked if possible. We will not replace any lost or stolen medicine. If you don t finish your medicine, please throw away (dispose) as directed by your pharmacist. The [place] Pollution Control Agency has more information about safe disposal: https://www.pca.[facility].[place].us/living-green/managing-unwanted-medications.     All opioids tend to cause constipation. Drink plenty of water and eat foods that have a lot of fiber, such as fruits, vegetables, prune juice, apple juice and high-fiber cereal. Take a laxative (Miralax, milk of magnesia, Colace, Senna) if you don t move your bowels at least every other day.              Medication List: This is a list of all your medications and when to take them. Check marks below indicate your daily home schedule. Keep this list as a reference.      Medications           Morning Afternoon Evening Bedtime As Needed    acetaminophen 325 MG tablet   Commonly known as:  TYLENOL   Take 2 tablets (650 mg) by mouth every 4 hours as needed for mild pain   Last time this was given:  650 mg on 6/30/2018 10:17 PM                                aspirin 81 MG chewable tablet   81 mg   Last time this was given:  81 mg on 7/2/2018  8:17 AM                                atorvastatin 20 MG tablet   Commonly known as:  LIPITOR   20 mg                                Blood Glucose Monitor System w/Device Kit   1 Kit                                BLOOD GLUCOSE TEST STRIPS Strp   1 strip                                docusate sodium 100 MG capsule   Commonly known as:  COLACE   200 mg   Last time this was given:  100 mg on 7/2/2018  8:17 AM                                enoxaparin 40 MG/0.4ML injection   Commonly known as:  LOVENOX   Inject 0.4 mLs (40 mg) Subcutaneous every 24 hours   Last time this was given:  40 mg on 7/1/2018  3:13 PM                                insulin aspart 100 UNIT/ML injection   Commonly  "known as:  NovoLOG PEN   4-15 Units                                insulin glargine 100 UNIT/ML injection   Commonly known as:  LANTUS   Inject 20 Units Subcutaneous At Bedtime HOLD UNTIL YOU FOLLOW UP WITH YOUR PCP                                Lancets Misc   1 each                                LORazepam 0.5 MG tablet   Commonly known as:  ATIVAN   Take 1 tablet (0.5 mg) by mouth daily as needed for anxiety (Take one tablet now. Repeat in 20 minutes if needed)                                losartan-hydrochlorothiazide 100-12.5 MG per tablet   Commonly known as:  HYZAAR   1 tablet                                metFORMIN 1000 MG tablet   Commonly known as:  GLUCOPHAGE   Take 1 tablet (1,000 mg) by mouth HOLD UNTIL YOU FOLLOW UP WITH YOUR PCP                                ondansetron 4 MG ODT tab   Commonly known as:  ZOFRAN-ODT   DISSOLVE ONE TABLET BY MOUTH EVERY 4 HOURS AS NEEDED FOR NAUSEA                                oxyCODONE IR 5 MG tablet   Commonly known as:  ROXICODONE   Take 1-2 tablets (5-10 mg) by mouth every 4 hours as needed for moderate to severe pain   Last time this was given:  5 mg on 7/2/2018  3:26 AM                                Pen Needles 5/16\" 30G X 8 MM Misc   Use as directed                                polyethylene glycol powder   Commonly known as:  MIRALAX   Take 17 g (1 capful) by mouth daily as needed for constipation                                prochlorperazine 10 MG tablet   Commonly known as:  COMPAZINE   10 mg                                  "

## 2018-06-29 NOTE — IP AVS SNAPSHOT
[address] [address]    Phone:  [phone]                                       After Visit Summary   6/29/2018    Manda Santacruz    MRN: [id number]           After Visit Summary Signature Page     I have received my discharge instructions, and my questions have been answered. I have discussed any challenges I see with this plan with the nurse or doctor.    ..........................................................................................................................................  Patient/Patient Representative Signature      ..........................................................................................................................................  Patient Representative Print Name and Relationship to Patient    ..................................................               ................................................  Date                                            Time    ..........................................................................................................................................  Reviewed by Signature/Title    ...................................................              ..............................................  Date                                                            Time

## 2018-06-30 LAB
ANION GAP SERPL CALCULATED.3IONS-SCNC: 14 MMOL/L (ref 3–14)
BASOPHILS # BLD AUTO: 0 10E9/L (ref 0–0.2)
BASOPHILS NFR BLD AUTO: 0 %
BUN SERPL-MCNC: 9 MG/DL (ref 7–30)
CALCIUM SERPL-MCNC: 8.6 MG/DL (ref 8.5–10.1)
CHLORIDE SERPL-SCNC: 104 MMOL/L (ref 94–109)
CO2 SERPL-SCNC: 19 MMOL/L (ref 20–32)
CREAT SERPL-MCNC: 0.49 MG/DL (ref 0.52–1.04)
DIFFERENTIAL METHOD BLD: ABNORMAL
EOSINOPHIL # BLD AUTO: 0 10E9/L (ref 0–0.7)
EOSINOPHIL NFR BLD AUTO: 0 %
ERYTHROCYTE [DISTWIDTH] IN BLOOD BY AUTOMATED COUNT: 15.8 % (ref 10–15)
GFR SERPL CREATININE-BSD FRML MDRD: >90 ML/MIN/1.7M2
GLUCOSE BLDC GLUCOMTR-MCNC: 103 MG/DL (ref 70–99)
GLUCOSE BLDC GLUCOMTR-MCNC: 105 MG/DL (ref 70–99)
GLUCOSE BLDC GLUCOMTR-MCNC: 105 MG/DL (ref 70–99)
GLUCOSE BLDC GLUCOMTR-MCNC: 106 MG/DL (ref 70–99)
GLUCOSE BLDC GLUCOMTR-MCNC: 128 MG/DL (ref 70–99)
GLUCOSE BLDC GLUCOMTR-MCNC: 94 MG/DL (ref 70–99)
GLUCOSE SERPL-MCNC: 93 MG/DL (ref 70–99)
HCT VFR BLD AUTO: 25.6 % (ref 35–47)
HGB BLD-MCNC: 8.4 G/DL (ref 11.7–15.7)
LYMPHOCYTES # BLD AUTO: 0.8 10E9/L (ref 0.8–5.3)
LYMPHOCYTES NFR BLD AUTO: 44 %
MAGNESIUM SERPL-MCNC: 1.6 MG/DL (ref 1.6–2.3)
MCH RBC QN AUTO: 30 PG (ref 26.5–33)
MCHC RBC AUTO-ENTMCNC: 32.8 G/DL (ref 31.5–36.5)
MCV RBC AUTO: 91 FL (ref 78–100)
MONOCYTES # BLD AUTO: 0.4 10E9/L (ref 0–1.3)
MONOCYTES NFR BLD AUTO: 22 %
NEUTROPHILS # BLD AUTO: 0.6 10E9/L (ref 1.6–8.3)
NEUTROPHILS NFR BLD AUTO: 34 %
PHOSPHATE SERPL-MCNC: 2.9 MG/DL (ref 2.5–4.5)
PLATELET # BLD AUTO: 211 10E9/L (ref 150–450)
PLATELET # BLD EST: ABNORMAL 10*3/UL
POTASSIUM SERPL-SCNC: 3.4 MMOL/L (ref 3.4–5.3)
RBC # BLD AUTO: 2.8 10E12/L (ref 3.8–5.2)
RBC MORPH BLD: NORMAL
SODIUM SERPL-SCNC: 138 MMOL/L (ref 133–144)
WBC # BLD AUTO: 1.9 10E9/L (ref 4–11)

## 2018-06-30 PROCEDURE — 99222 1ST HOSP IP/OBS MODERATE 55: CPT | Mod: AI | Performed by: OBSTETRICS & GYNECOLOGY

## 2018-06-30 PROCEDURE — 00000146 ZZHCL STATISTIC GLUCOSE BY METER IP

## 2018-06-30 PROCEDURE — 85025 COMPLETE CBC W/AUTO DIFF WBC: CPT | Performed by: STUDENT IN AN ORGANIZED HEALTH CARE EDUCATION/TRAINING PROGRAM

## 2018-06-30 PROCEDURE — 36415 COLL VENOUS BLD VENIPUNCTURE: CPT | Performed by: STUDENT IN AN ORGANIZED HEALTH CARE EDUCATION/TRAINING PROGRAM

## 2018-06-30 PROCEDURE — 83735 ASSAY OF MAGNESIUM: CPT | Performed by: STUDENT IN AN ORGANIZED HEALTH CARE EDUCATION/TRAINING PROGRAM

## 2018-06-30 PROCEDURE — 84100 ASSAY OF PHOSPHORUS: CPT | Performed by: STUDENT IN AN ORGANIZED HEALTH CARE EDUCATION/TRAINING PROGRAM

## 2018-06-30 PROCEDURE — 80048 BASIC METABOLIC PNL TOTAL CA: CPT | Performed by: STUDENT IN AN ORGANIZED HEALTH CARE EDUCATION/TRAINING PROGRAM

## 2018-06-30 PROCEDURE — 12000001 ZZH R&B MED SURG/OB UMMC

## 2018-06-30 PROCEDURE — 25000128 H RX IP 250 OP 636: Performed by: STUDENT IN AN ORGANIZED HEALTH CARE EDUCATION/TRAINING PROGRAM

## 2018-06-30 PROCEDURE — 25000132 ZZH RX MED GY IP 250 OP 250 PS 637: Performed by: STUDENT IN AN ORGANIZED HEALTH CARE EDUCATION/TRAINING PROGRAM

## 2018-06-30 RX ORDER — DEXTROSE MONOHYDRATE 25 G/50ML
25-50 INJECTION, SOLUTION INTRAVENOUS
Status: DISCONTINUED | OUTPATIENT
Start: 2018-06-30 | End: 2018-07-02 | Stop reason: HOSPADM

## 2018-06-30 RX ORDER — NICOTINE POLACRILEX 4 MG
15-30 LOZENGE BUCCAL
Status: DISCONTINUED | OUTPATIENT
Start: 2018-06-30 | End: 2018-07-02 | Stop reason: HOSPADM

## 2018-06-30 RX ORDER — ATORVASTATIN CALCIUM 20 MG/1
20 TABLET, FILM COATED ORAL DAILY
Status: DISCONTINUED | OUTPATIENT
Start: 2018-06-30 | End: 2018-07-02 | Stop reason: HOSPADM

## 2018-06-30 RX ADMIN — ACETAMINOPHEN 650 MG: 325 TABLET, FILM COATED ORAL at 15:17

## 2018-06-30 RX ADMIN — OXYCODONE HYDROCHLORIDE 5 MG: 5 TABLET ORAL at 22:17

## 2018-06-30 RX ADMIN — Medication 0.5 MG: at 08:13

## 2018-06-30 RX ADMIN — ACETAMINOPHEN 650 MG: 325 TABLET, FILM COATED ORAL at 22:17

## 2018-06-30 RX ADMIN — MAGNESIUM SULFATE HEPTAHYDRATE 2 G: 40 INJECTION, SOLUTION INTRAVENOUS at 13:26

## 2018-06-30 RX ADMIN — Medication 10 MEQ: at 16:38

## 2018-06-30 RX ADMIN — ENOXAPARIN SODIUM 40 MG: 40 INJECTION SUBCUTANEOUS at 16:38

## 2018-06-30 RX ADMIN — OXYCODONE HYDROCHLORIDE 5 MG: 5 TABLET ORAL at 15:17

## 2018-06-30 RX ADMIN — Medication 10 MEQ: at 17:46

## 2018-06-30 RX ADMIN — Medication 0.5 MG: at 11:36

## 2018-06-30 RX ADMIN — SODIUM CHLORIDE: 9 INJECTION, SOLUTION INTRAVENOUS at 10:49

## 2018-06-30 RX ADMIN — Medication 0.3 MG: at 03:30

## 2018-06-30 ASSESSMENT — ACTIVITIES OF DAILY LIVING (ADL)
TRANSFERRING: 0-->INDEPENDENT
SWALLOWING: 0-->SWALLOWS FOODS/LIQUIDS WITHOUT DIFFICULTY
AMBULATION: 1-->ASSISTIVE EQUIPMENT
RETIRED_EATING: 0-->INDEPENDENT
FALL_HISTORY_WITHIN_LAST_SIX_MONTHS: YES
NUMBER_OF_TIMES_PATIENT_HAS_FALLEN_WITHIN_LAST_SIX_MONTHS: 3
WHICH_OF_THE_ABOVE_FUNCTIONAL_RISKS_HAD_A_RECENT_ONSET_OR_CHANGE?: FALL HISTORY
COGNITION: 0 - NO COGNITION ISSUES REPORTED
DRESS: 0-->INDEPENDENT
BATHING: 0-->INDEPENDENT
TOILETING: 0-->INDEPENDENT
RETIRED_COMMUNICATION: 0-->UNDERSTANDS/COMMUNICATES WITHOUT DIFFICULTY

## 2018-06-30 ASSESSMENT — PAIN DESCRIPTION - DESCRIPTORS
DESCRIPTORS: ACHING
DESCRIPTORS: ACHING
DESCRIPTORS: DISCOMFORT
DESCRIPTORS: ACHING

## 2018-06-30 NOTE — PROGRESS NOTES
Interval note    Patient's NG tube was removed. She is tolerating her clear liquid diet. Neutropenic precautions were canceled and the patient states she doesn't want to be roommates with a sick patient. She states if she gets a roommate she would like to be discharged tomorrow. Her daughter is at bedside and tells the patient that she should stay in the hospital until she is medically cleared to leave. In addition, the patient doesn't have a car ride to home until Monday day.     Counseled patient that contagious patients are given their own rooms and typically don't have roommates. She seemed reassured by this.     She has put out adequate UOP today and is tolerating clears so will stop IV fluids.     She was started on lovenox for DVT prophylaxis.    Vitals:    06/30/18 1350 06/30/18 1452 06/30/18 1509 06/30/18 1517   BP:  135/85     BP Location:  Right arm     Pulse:  80     Resp:  16  16   Temp:  97.3  F (36.3  C)     TempSrc:  Axillary     SpO2: 97% 100% 99%    Weight:           Intake/Output Summary (Last 24 hours) at 06/30/18 1758  Last data filed at 06/30/18 1743   Gross per 24 hour   Intake             1525 ml   Output             1150 ml   Net              375 ml       Tammy Lopez MD   Resident Physician, PGY3  Obstetrics, Gynecology, and Women's Health

## 2018-06-30 NOTE — PLAN OF CARE
Problem: Pain, Acute (Adult)  Goal: Identify Related Risk Factors and Signs and Symptoms  Related risk factors and signs and symptoms are identified upon initiation of Human Response Clinical Practice Guideline (CPG).   Outcome: No Change  Slightly tachycardic, OVSS. Pain managed with PRN dilaudid, hot packs. NG to LIS, moderate amt green output. BG stable, no SS insulin required. Up to commode with 1 assist. +BM x1 overnight. Intermittent incontinence. Daughter at bedside. Continue POC.

## 2018-06-30 NOTE — PLAN OF CARE
Problem: Patient Care Overview  Goal: Plan of Care/Patient Progress Review  Outcome: Therapy, progress towards functional goals is fair  HD2 POD2 of jejunal perf repair. A&Ox4, on room air, but desats with activity to 85%. Pain is controlled with IV dilaudid. UW1 to commode, BM today, formed. NG out, CLD, PIV infusing IVM, Mg++ replaced, Recheck ordered. K+ still to replace. Blanchable redness to both buttocks with purple nonblanchable skin on the left buttock Mepilex. Small skin tear on back of right thigh, mepilex in place. BS hypoactive, LSC diminished in RLL Midline incision is STEFANO, CDI. Glucose checks AC/HS. No insulin given today. Plan is to see how Clears go, Control pain. Possible DC 1-2 days.

## 2018-06-30 NOTE — PROGRESS NOTES
Gynecology Oncology Progress Note  06/30/18    HD#2 FARIDA from [facility] for patient preference, POD#2 from jejunal perforation repair at 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  - Weaned from PCA  - Neutropenic precautions    Subjective:   Patient denies abdominal pain but is uncomfortable with NG tube and would like it removed. Currently NPO. Denies nausea or vomiting.  Passed flatus and had small BM.  Voiding spontaneously.  Ambulating to commode.      Objective:   Vitals:    06/29/18 2011 06/29/18 2259 06/29/18 2346 06/30/18 0414   BP: 125/77  136/79 126/78   BP Location: Other (Comment)  Right arm Right arm   Pulse: 100  113 91   Resp: 18 18 18   Temp: 97.8  F (36.6  C)  98.3  F (36.8  C) 96.9  F (36.1  C)   TempSrc: Oral  Oral Axillary   SpO2: 99%  97% 98%   Weight:  87.4 kg (192 lb 9.6 oz)         EXAM:   General:  NAD  CV: RRR, no murmurs noted  Resp: CTAB, no increased work of breathing  Abdomen: soft, non-tender, non-distended  Incision: clean, dry, intact, no erythema or drainage, staples in place  Extremities: nontender, trace edema    Intake/Output (24 Hrs // Since MN)  PO 0 mL // 0 mL   mL // 600 mL    UOP 0 mL // 0 mL    Emesis/NG output 100 mL // 200 mL    Labs/Imaging:  CBC, BMP, Mg, Phos pending    # Pain Assessment:  Current Pain Score 6/30/2018   Patient currently in pain? denies   Pain location -   Pain descriptors -   - Manda is experiencing pain due to exploratory laparotomy. Pain management was discussed and the plan was created in a collaborative fashion.  Manda's response to the current recommendations: engaged  - Please see the plan for pain management as documented above      Assessment: Manda Santacruz is a 57 year old female stage IV endometrial cancer, transferred from [facility] following jejunal repair due to bowel perforation.     Active Problem list:  Stage IV high grade serous endometrial cancer  SBO  Bowel  perforation, s/p surgical repair  Postoperative status  Hypertension  Type 2 DM  Hydronephrosis     Plan:    Disease: Stage IV endometrial cancer, s/p 3 cycles of neoadjuvant Carbo/Taxol (4/2018-6/2018.) CT scan 6/20/2018 with residual disease. Plan for continued neoadjuvent therapy.   FEN: Will discontinue NGT, advance to clear liquid diet. IVF NS 75 ml/hour, will wean with adequate   Pain: Dilaudid IV, Tylenol & Roxicodone per NG PRN   Heme: Pancytopenia due to chemotherapy, will monitor.  CV: HTN, home meds held due to normal BPs, will monitor. Home ASA, atorvastatin held.  Resp: NI  GI: PRN antiemetics, plan to remove NG today. Add bowel regimen  : Mild to moderate right hydronephrosis on CT scan, normal renal function.  MSK: NI  Neuro/Psych: NI  Endocrine: Type 2 DM. Home Novolog and Lantis held. Medium SSI.   ID: Neutropenic precautions  PPx: SCDs   Drains/Lines: Left PICC, NG  Disposition: To home when meeting postoperative goals, possibly early next week.    Alicia Myhre, DO  OB/GYN PGY-2    I have seen and examined the patient.  I have reviewed and edited Dr. Myhre's note above.    Annabel Lopez MD  6/30/2018  12:03 PM

## 2018-06-30 NOTE — PLAN OF CARE
Pt transferred from [facility]. Arrived around 2000. VSS. Abdominal pain managed, IV dilaudid given x1. Denies nausea. NG to LIS with 100 ml green drainage out. NPO. BG check 100 at HS. NS running @ 75 mL/hr. Midline abdominal incision STEFANO with staples. Denies passing flatus or BM. Pt states she has voided several times since corona was removed this AM. One episode of urinary incontinence since arriving to . Pt got up to commode with assist, but did not void on commode. Blanchable erythema to bottom, and small dark spot noted on R inner buttocks. Will encourage and assist pt to reposition frequently in bed. Continue with POC.

## 2018-07-01 ENCOUNTER — APPOINTMENT (OUTPATIENT)
Dept: OCCUPATIONAL THERAPY | Facility: CLINIC | Age: 58
DRG: 755 | End: 2018-07-01
Attending: STUDENT IN AN ORGANIZED HEALTH CARE EDUCATION/TRAINING PROGRAM
Payer: COMMERCIAL

## 2018-07-01 LAB
ANION GAP SERPL CALCULATED.3IONS-SCNC: 11 MMOL/L (ref 3–14)
ANISOCYTOSIS BLD QL SMEAR: SLIGHT
BASOPHILS # BLD AUTO: 0 10E9/L (ref 0–0.2)
BASOPHILS NFR BLD AUTO: 0.9 %
BUN SERPL-MCNC: 7 MG/DL (ref 7–30)
CALCIUM SERPL-MCNC: 8.7 MG/DL (ref 8.5–10.1)
CHLORIDE SERPL-SCNC: 102 MMOL/L (ref 94–109)
CO2 SERPL-SCNC: 24 MMOL/L (ref 20–32)
CREAT SERPL-MCNC: 0.45 MG/DL (ref 0.52–1.04)
DIFFERENTIAL METHOD BLD: ABNORMAL
EOSINOPHIL # BLD AUTO: 0 10E9/L (ref 0–0.7)
EOSINOPHIL NFR BLD AUTO: 0 %
ERYTHROCYTE [DISTWIDTH] IN BLOOD BY AUTOMATED COUNT: 15.9 % (ref 10–15)
GFR SERPL CREATININE-BSD FRML MDRD: >90 ML/MIN/1.7M2
GLUCOSE BLDC GLUCOMTR-MCNC: 102 MG/DL (ref 70–99)
GLUCOSE BLDC GLUCOMTR-MCNC: 107 MG/DL (ref 70–99)
GLUCOSE BLDC GLUCOMTR-MCNC: 93 MG/DL (ref 70–99)
GLUCOSE BLDC GLUCOMTR-MCNC: 96 MG/DL (ref 70–99)
GLUCOSE SERPL-MCNC: 97 MG/DL (ref 70–99)
HCT VFR BLD AUTO: 25.5 % (ref 35–47)
HGB BLD-MCNC: 8.3 G/DL (ref 11.7–15.7)
LYMPHOCYTES # BLD AUTO: 0.7 10E9/L (ref 0.8–5.3)
LYMPHOCYTES NFR BLD AUTO: 47.3 %
MACROCYTES BLD QL SMEAR: PRESENT
MAGNESIUM SERPL-MCNC: 1.6 MG/DL (ref 1.6–2.3)
MCH RBC QN AUTO: 29.5 PG (ref 26.5–33)
MCHC RBC AUTO-ENTMCNC: 32.5 G/DL (ref 31.5–36.5)
MCV RBC AUTO: 91 FL (ref 78–100)
MONOCYTES # BLD AUTO: 0.4 10E9/L (ref 0–1.3)
MONOCYTES NFR BLD AUTO: 28.6 %
NEUTROPHILS # BLD AUTO: 0.3 10E9/L (ref 1.6–8.3)
NEUTROPHILS NFR BLD AUTO: 23.2 %
NRBC # BLD AUTO: 0 10*3/UL
NRBC BLD AUTO-RTO: 2 /100
PHOSPHATE SERPL-MCNC: 2.9 MG/DL (ref 2.5–4.5)
PLATELET # BLD AUTO: 206 10E9/L (ref 150–450)
PLATELET # BLD EST: ABNORMAL 10*3/UL
POTASSIUM SERPL-SCNC: 2.8 MMOL/L (ref 3.4–5.3)
POTASSIUM SERPL-SCNC: 3.4 MMOL/L (ref 3.4–5.3)
RBC # BLD AUTO: 2.81 10E12/L (ref 3.8–5.2)
SODIUM SERPL-SCNC: 137 MMOL/L (ref 133–144)
WBC # BLD AUTO: 1.4 10E9/L (ref 4–11)

## 2018-07-01 PROCEDURE — 80048 BASIC METABOLIC PNL TOTAL CA: CPT | Performed by: STUDENT IN AN ORGANIZED HEALTH CARE EDUCATION/TRAINING PROGRAM

## 2018-07-01 PROCEDURE — 40000133 ZZH STATISTIC OT WARD VISIT

## 2018-07-01 PROCEDURE — 83735 ASSAY OF MAGNESIUM: CPT | Performed by: STUDENT IN AN ORGANIZED HEALTH CARE EDUCATION/TRAINING PROGRAM

## 2018-07-01 PROCEDURE — 00000146 ZZHCL STATISTIC GLUCOSE BY METER IP

## 2018-07-01 PROCEDURE — 97165 OT EVAL LOW COMPLEX 30 MIN: CPT | Mod: GO

## 2018-07-01 PROCEDURE — 36415 COLL VENOUS BLD VENIPUNCTURE: CPT | Performed by: OBSTETRICS & GYNECOLOGY

## 2018-07-01 PROCEDURE — 25000132 ZZH RX MED GY IP 250 OP 250 PS 637: Performed by: OBSTETRICS & GYNECOLOGY

## 2018-07-01 PROCEDURE — 85025 COMPLETE CBC W/AUTO DIFF WBC: CPT | Performed by: STUDENT IN AN ORGANIZED HEALTH CARE EDUCATION/TRAINING PROGRAM

## 2018-07-01 PROCEDURE — 97535 SELF CARE MNGMENT TRAINING: CPT | Mod: GO

## 2018-07-01 PROCEDURE — 12000001 ZZH R&B MED SURG/OB UMMC

## 2018-07-01 PROCEDURE — 25000132 ZZH RX MED GY IP 250 OP 250 PS 637: Performed by: STUDENT IN AN ORGANIZED HEALTH CARE EDUCATION/TRAINING PROGRAM

## 2018-07-01 PROCEDURE — 84132 ASSAY OF SERUM POTASSIUM: CPT | Performed by: OBSTETRICS & GYNECOLOGY

## 2018-07-01 PROCEDURE — 84100 ASSAY OF PHOSPHORUS: CPT | Performed by: STUDENT IN AN ORGANIZED HEALTH CARE EDUCATION/TRAINING PROGRAM

## 2018-07-01 PROCEDURE — 97530 THERAPEUTIC ACTIVITIES: CPT | Mod: GO

## 2018-07-01 PROCEDURE — 25000128 H RX IP 250 OP 636: Performed by: STUDENT IN AN ORGANIZED HEALTH CARE EDUCATION/TRAINING PROGRAM

## 2018-07-01 PROCEDURE — 36415 COLL VENOUS BLD VENIPUNCTURE: CPT | Performed by: STUDENT IN AN ORGANIZED HEALTH CARE EDUCATION/TRAINING PROGRAM

## 2018-07-01 RX ORDER — MAGNESIUM SULFATE HEPTAHYDRATE 40 MG/ML
2 INJECTION, SOLUTION INTRAVENOUS DAILY PRN
Status: DISCONTINUED | OUTPATIENT
Start: 2018-07-01 | End: 2018-07-02 | Stop reason: HOSPADM

## 2018-07-01 RX ORDER — ASPIRIN 81 MG/1
81 TABLET, CHEWABLE ORAL DAILY
Status: DISCONTINUED | OUTPATIENT
Start: 2018-07-01 | End: 2018-07-02 | Stop reason: HOSPADM

## 2018-07-01 RX ORDER — POLYETHYLENE GLYCOL 3350 17 G/17G
17 POWDER, FOR SOLUTION ORAL ONCE
Status: DISCONTINUED | OUTPATIENT
Start: 2018-07-01 | End: 2018-07-01

## 2018-07-01 RX ORDER — POTASSIUM CL/LIDO/0.9 % NACL 10MEQ/0.1L
10 INTRAVENOUS SOLUTION, PIGGYBACK (ML) INTRAVENOUS
Status: DISCONTINUED | OUTPATIENT
Start: 2018-07-01 | End: 2018-07-02 | Stop reason: HOSPADM

## 2018-07-01 RX ORDER — POTASSIUM CHLORIDE 1.5 G/1.58G
20-40 POWDER, FOR SOLUTION ORAL
Status: DISCONTINUED | OUTPATIENT
Start: 2018-07-01 | End: 2018-07-02 | Stop reason: HOSPADM

## 2018-07-01 RX ORDER — DOCUSATE SODIUM 100 MG/1
100 CAPSULE, LIQUID FILLED ORAL 2 TIMES DAILY
Status: DISCONTINUED | OUTPATIENT
Start: 2018-07-01 | End: 2018-07-02 | Stop reason: HOSPADM

## 2018-07-01 RX ORDER — POTASSIUM CHLORIDE 750 MG/1
20-40 TABLET, EXTENDED RELEASE ORAL
Status: DISCONTINUED | OUTPATIENT
Start: 2018-07-01 | End: 2018-07-02 | Stop reason: HOSPADM

## 2018-07-01 RX ORDER — POTASSIUM CHLORIDE 29.8 MG/ML
20 INJECTION INTRAVENOUS
Status: DISCONTINUED | OUTPATIENT
Start: 2018-07-01 | End: 2018-07-02 | Stop reason: HOSPADM

## 2018-07-01 RX ORDER — MAGNESIUM SULFATE HEPTAHYDRATE 40 MG/ML
4 INJECTION, SOLUTION INTRAVENOUS EVERY 4 HOURS PRN
Status: DISCONTINUED | OUTPATIENT
Start: 2018-07-01 | End: 2018-07-02 | Stop reason: HOSPADM

## 2018-07-01 RX ORDER — POTASSIUM CHLORIDE 7.45 MG/ML
10 INJECTION INTRAVENOUS
Status: DISCONTINUED | OUTPATIENT
Start: 2018-07-01 | End: 2018-07-02 | Stop reason: HOSPADM

## 2018-07-01 RX ADMIN — POTASSIUM CHLORIDE 20 MEQ: 750 TABLET, EXTENDED RELEASE ORAL at 21:59

## 2018-07-01 RX ADMIN — Medication 10 MEQ: at 16:35

## 2018-07-01 RX ADMIN — MAGNESIUM SULFATE HEPTAHYDRATE 2 G: 40 INJECTION, SOLUTION INTRAVENOUS at 05:56

## 2018-07-01 RX ADMIN — OXYCODONE HYDROCHLORIDE 5 MG: 5 TABLET ORAL at 08:11

## 2018-07-01 RX ADMIN — OXYCODONE HYDROCHLORIDE 10 MG: 5 TABLET ORAL at 20:07

## 2018-07-01 RX ADMIN — Medication 10 MEQ: at 13:40

## 2018-07-01 RX ADMIN — Medication 10 MEQ: at 11:59

## 2018-07-01 RX ADMIN — Medication 10 MEQ: at 10:13

## 2018-07-01 RX ADMIN — DOCUSATE SODIUM 100 MG: 100 CAPSULE, LIQUID FILLED ORAL at 20:00

## 2018-07-01 RX ADMIN — ASPIRIN 81 MG CHEWABLE TABLET 81 MG: 81 TABLET CHEWABLE at 08:20

## 2018-07-01 RX ADMIN — OXYCODONE HYDROCHLORIDE 5 MG: 5 TABLET ORAL at 16:05

## 2018-07-01 RX ADMIN — Medication 10 MEQ: at 08:16

## 2018-07-01 RX ADMIN — ENOXAPARIN SODIUM 40 MG: 40 INJECTION SUBCUTANEOUS at 15:13

## 2018-07-01 RX ADMIN — DOCUSATE SODIUM 100 MG: 100 CAPSULE, LIQUID FILLED ORAL at 12:24

## 2018-07-01 RX ADMIN — Medication 10 MEQ: at 15:13

## 2018-07-01 ASSESSMENT — PAIN DESCRIPTION - DESCRIPTORS
DESCRIPTORS: DISCOMFORT
DESCRIPTORS: ACHING
DESCRIPTORS: DISCOMFORT

## 2018-07-01 ASSESSMENT — ACTIVITIES OF DAILY LIVING (ADL): PREVIOUS_RESPONSIBILITIES: MEAL PREP;MEDICATION MANAGEMENT;DRIVING

## 2018-07-01 NOTE — PLAN OF CARE
Problem: Patient Care Overview  Goal: Plan of Care/Patient Progress Review  Outcome: Improving  Resumed care . Pt A/O x4, VSS. /82. Temp max 100. Pain manage with oxy prn. Abd incision c/d/i, STEFANO. Abd rounded, BS present x4. Voiding spontaneously in adequate amounts. Small BM + gas. Reg diet, poor appetite. Ate small amt mashed potatoes and pineapple. BGs w/ meals and HOS. No insulin given . K+ replaced for level of 2.8. Recheck due 1930 and tomorrow AM. Ambulated SBA. Pt will likely DC home to [place] tomorrow. Cont POC.

## 2018-07-01 NOTE — PLAN OF CARE
Problem: Patient Care Overview  Goal: Individualization & Mutuality  Vitals stable,blood glucose stable.pain comfortably managed with prn oxycodone 5mg x 1,abdominal incision clean and dry,staples intact.No bowel movement,passing flatus.complained of feeling constipated,reliving nurse would obtain order for stool softner.Poatssium 2.8,potassium replacement running..Continue per plan of care.

## 2018-07-01 NOTE — PLAN OF CARE
Problem: Patient Care Overview  Goal: Plan of Care/Patient Progress Review  Outcome: Improving  VSS. Up with 1 assist. Pain managed with 5 mg oxycodone and tylenol PRN. Denies nausea. Tolerating small amounts of clear liquids. BG checks 94 and 106, no insulin needed. Voiding good amounts. Passing flatus. Midline incision STEFANO with staples. K replaced, re-check tomorrow. Continue with POC.

## 2018-07-01 NOTE — PROGRESS NOTES
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 Slept well for the most part. Did soil the bed with bowel movement, but now is feeling a lot better after having several BMs over the last day.  Pain is well controlled on PO medications.  Tolerating regular diet without nausea or vomiting, eating small amounts. Ambulated quite a bit yesterday without dizziness.  Voiding spontaneously. Strongly desires discharge today.    Objective:   Vitals:    07/01/18 1251 07/01/18 1601 07/01/18 1638 07/01/18 2153   BP: 136/82 140/82  132/78   BP Location: Right leg Right arm  Right arm   Pulse: 84 95  81   Resp: 16 16  15   Temp: 98.6  F (37  C) 100  F (37.8  C) 99.1  F (37.3  C) 98.9  F (37.2  C)   TempSrc: Oral Oral Oral Oral   SpO2: 98% 98%  96%   Weight:         GEN - NAD, sitting comfortably in bed  CV - RRR  PULM - CTAB, no wheezing  ABD - soft, non distended, non tender to palpation, + BM  INC - vertical midline incision. Clean, dry, intact. Staples intact.  Ext - warm and well perfused, +1 edema, non-tender    Lines/drains:   PICC line    I/Os  (Yesterday // Since Midnight)  PO: 360 cc // 0 cc  IVF: 600 cc // 0 cc  Urine: 675 cc (+ 2 unmeasured, 100 cc mixed stool urine)// 100 cc mixed stool/urine      New Labs/Imaging-   K 3.6     7/2/2018 05:00   WBC 2.7 (L)   Hemoglobin 9.1 (L)   Hematocrit 28.0 (L)   Platelet Count 179   RBC Count 3.03 (L)   MCV 92   MCH 30.0   MCHC 32.5   RDW 15.7 (H)   Diff Method Manual Differential   % Neutrophils 22.0   % Lymphocytes 34.0   % Monocytes 44.0   % Eosinophils 0.0   % Basophils 0.0   Absolute Neutrophil 0.6 (L)   Absolute Lymphocytes 0.9   Absolute Monocytes 1.2   Absolute Eosinophils 0.0   Absolute Basophils 0.0   Anisocytosis Slight        Recent Labs  Lab 07/01/18  2151 07/01/18  1820 07/01/18  1248 07/01/18  0807 07/01/18  0442 06/30/18  2141 06/30/18  1739  06/30/18  1047   GLC  --   --   --   --  97  --   --   --  93   * 96 93 107*  --  106* 94  < >  --    < > = values in this interval not displayed.    Assessment:  Manda Santacruz is a 57 year old female stage IV endometrial cancer, HD#4 FARIDA from [facility] POD#4 s/p jejunal repair due to bowel perforation.      Active Problem list:  Stage IV high grade serous endometrial cancer  SBO  Bowel perforation, s/p surgical repair  Postoperative status  Hypertension  Type 2 DM  Hydronephrosis   Anemia  Neutropenia    Hypokalemia  Hypomagnesemia     Plan:    Disease: Stage IV endometrial cancer, s/p 3 cycles of neoadjuvant Carbo/Taxol (4/2018-6/2018.). CT scan 6/20/2018 with residual disease. Plan for continued neoadjuvant therapy.   FEN: S/p NGT, tolerating regular diet. Hypokalemia and hypomagnesemia, s/p ERP, now resolved.  Pain:  Tylenol & Roxicodone PRN   Heme: Pancytopenia due to chemotherapy, now improving.  CV: HTN, home meds held due to normal BPs, will monitor. HLD- ASA, atorvastatin.  Resp: NI  GI: PRN antiemetics and bowel regimen. Nothing per rectum due to bowel resection.  : Mild to moderate right hydronephrosis on CT scan, normal renal function.  MSK: NI  Neuro/Psych: NI  Endocrine: Type 2 DM. Home metformin and Lantus held. Medium SSI.   ID: Neutropenia, continue to trend as above.  PPx: SCDs. Continue ppx lovenox, plan to continue for 28 days.  Drains/Lines: Left PIV  Disposition: Discharge to home with home PT today.       # Pain Assessment:  Current Pain Score 7/2/2018   Patient currently in pain? yes   Pain location -   Pain descriptors -   - Manda is experiencing pain due to exploratory laparotomy. Pain management was discussed and the plan was created in a collaborative fashion.  Manda's response to the current recommendations: engaged  - Please see the plan for  pain management as documented above    Amina Wilkerson MD   Resident Physician, PGY2  Obstetrics, Gynecology, and Women's Health    I have examined this patient with our resident who acted as as scribe and agree with the above findings and plan.  SHe is toleratign PO and has had BM.  Will ambulate and consider DC to home today (PT arranged)    Kael Saini

## 2018-07-01 NOTE — PLAN OF CARE
Problem: Patient Care Overview  Goal: Plan of Care/Patient Progress Review  OT 7C:  Discharge Planner OT   Patient plan for discharge: home with family   Current status: Pt educated on abdominal precautions and implications for daily activities; written handout provided. Educated on modifications to home setup and AE to increase IND. Pt requiring min A for bed mobility; CGA for STS transfers and close SBA using SPC for ~525 feet functional mobility with seated rest break. Pt progressing with tub transfers from SBA to mod IND with use of grab bars. Pt with good insight into limitations/deficits.   Barriers to return to prior living situation: stairs to access home, decreased activity tolerance  Recommendations for discharge: Home with assist and outpatient PT   Rationale for recommendations: Pt with strong family support and will have assist for all IADLs and ADLs as needed. Given decline in strength and endurance with chemotherapy treatment, pt would benefit from outpatient PT to address these deficits as well as balance to maximize safety and IND in home and community.        Entered by: Angela Conley 07/01/2018 10:37 AM

## 2018-07-01 NOTE — PLAN OF CARE
Problem: Patient Care Overview  Goal: Plan of Care/Patient Progress Review  Outcome: No Change  VSS. PRN tylenol and oxycodone for pain. Denies nausea. Tolerating small amounts clear liquids. Abdominal incision CDI. +passing flatus. Up with 1 assist to commode. Mg replacing. Will need K+ replaced. Pt resting comfortably. Continue POC.

## 2018-07-01 NOTE — PROGRESS NOTES
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  Pain is well controlled.  Eating and drinking clear liquids with no nausea or vomiting.  Passing some flatus, has not had BM since NG tube removed.  Voiding spontaneously.     Objective:   Vitals:    18 1517 18 1904 18 2200 18 0743   BP:  137/75 137/86 142/84   BP Location:  Right arm Right arm Right arm   Pulse:  94 76 81   Resp: 16 16 18 16   Temp:  96.7  F (35.9  C) 97.9  F (36.6  C) 97.8  F (36.6  C)   TempSrc:  Oral Oral Oral   SpO2:  96% 97% 97%   Weight:  87.6 kg (193 lb 3.2 oz)       GEN - NAD, sitting comfortably in bed  CV - RRR  PULM - CTAB, no wheezing  ABD - soft, non distended, non tender to palpation  INC - vertical midline incision. Clean, dry, intact. Staples intact.  Ext - warm and well perfused, +1 edema, non-tender    Lines/drains:   PICC line    I/Os  (Yesterday // Since Midnight)  PO: 275cc // 0cc  IVF: 1628cc // 0cc  Urine: 1050cc // 250cc  Ncc       New Labs/Imaging-   Results for orders placed or performed during the hospital encounter of 18 (from the past 24 hour(s))   Basic metabolic panel   Result Value Ref Range    Sodium 137 133 - 144 mmol/L    Potassium 2.8 (L) 3.4 - 5.3 mmol/L    Chloride 102 94 - 109 mmol/L    Carbon Dioxide 24 20 - 32 mmol/L    Anion Gap 11 3 - 14 mmol/L    Glucose 97 70 - 99 mg/dL    Urea Nitrogen 7 7 - 30 mg/dL    Creatinine 0.45 (L) 0.52 - 1.04 mg/dL    GFR Estimate >90 >60 mL/min/1.7m2    GFR Estimate If Black >90 >60 mL/min/1.7m2    Calcium 8.7 8.5 - 10.1 mg/dL   Magnesium   Result  Value Ref Range    Magnesium 1.6 1.6 - 2.3 mg/dL   Phosphorus   Result Value Ref Range    Phosphorus 2.9 2.5 - 4.5 mg/dL   CBC with platelets differential   Result Value Ref Range    WBC 1.4 (L) 4.0 - 11.0 10e9/L    RBC Count 2.81 (L) 3.8 - 5.2 10e12/L    Hemoglobin 8.3 (L) 11.7 - 15.7 g/dL    Hematocrit 25.5 (L) 35.0 - 47.0 %    MCV 91 78 - 100 fl    MCH 29.5 26.5 - 33.0 pg    MCHC 32.5 31.5 - 36.5 g/dL    RDW 15.9 (H) 10.0 - 15.0 %    Platelet Count 206 150 - 450 10e9/L    Diff Method Manual Differential     % Neutrophils 23.2 %    % Lymphocytes 47.3 %    % Monocytes 28.6 %    % Eosinophils 0.0 %    % Basophils 0.9 %    Nucleated RBCs 2 (H) 0 /100    Absolute Neutrophil 0.3 (LL) 1.6 - 8.3 10e9/L    Absolute Lymphocytes 0.7 (L) 0.8 - 5.3 10e9/L    Absolute Monocytes 0.4 0.0 - 1.3 10e9/L    Absolute Eosinophils 0.0 0.0 - 0.7 10e9/L    Absolute Basophils 0.0 0.0 - 0.2 10e9/L    Absolute Nucleated RBC 0.0     Anisocytosis Slight     Macrocytes Present     Platelet Estimate Confirming automated cell count        Recent Labs  Lab 07/01/18  0442 06/30/18  2141 06/30/18  1739 06/30/18  1219 06/30/18  1047 06/30/18  0840 06/30/18  0412 06/30/18  0003   GLC 97  --   --   --  93  --   --   --    BGM  --  106* 94 103*  --  105* 128* 105*       Assessment:  Manda Santacruz is a 57 year old female stage IV endometrial cancer, [place] [facility] [place] due to bowel perforation.      Active Problem list:  Stage IV high grade serous endometrial cancer  SBO  Bowel perforation, s/p surgical repair  Postoperative status  Hypertension  Type 2 DM  Hydronephrosis   Anemia  Neutropenia    Hypokalemia  Hypomagnesemia     Plan:    Disease: Stage IV endometrial cancer, s/p 3 cycles of neoadjuvant Carbo/Taxol (4/2018-6/2018.). CT scan 6/20/2018 with residual disease. Plan for continued neoadjuvent therapy.   FEN: S/p NGT, tolerating CLD. Plan to advance to regular diet today. Hypokalemia and hypomagnesemia, ERP  ordered.  Pain: Dilaudid IV, Tylenol & Roxicodone PRN   Heme: Pancytopenia due to chemotherapy, will monitor.  CV: HTN, home meds held due to normal BPs, will monitor. HLD- ASA, atorvastatin.  Resp: NI  GI: PRN antiemetics and bowel regimen. Nothing per rectum due to bowel resection.  : Mild to moderate right hydronephrosis on CT scan, normal renal function.  MSK: NI  Neuro/Psych: NI  Endocrine: Type 2 DM. Home Novolog and Lantis held. Medium SSI.   ID: Neutropenia, continue to trend as above.  PPx: SCDs. Continue ppx lovenox, plan to continue for 28 days.  Drains/Lines: Left PICC  Disposition: To home when meeting postoperative goals, possibly early next week.       # Pain Assessment:  Current Pain Score 7/1/2018   Patient currently in pain? denies   Pain location -   Pain descriptors -   - Manda is experiencing pain due to exploratory laparotomy. Pain management was discussed and the plan was created in a collaborative fashion.  Manda's response to the current recommendations: engaged  - Please see the plan for pain management as documented above      Tammy Lopez MD   Resident Physician, PGY3  Obstetrics, Gynecology, and Women's Health      GYN ONC Fellow Addendum:  No acute events overnight.  Remains afebrile, neutropenia not yet at kathy, . Pain well controlled, tolerated clears yesterday.  Will advance to soft this AM as tolerated. +BM/flatus.  Replete K and magnesium. Continue routine postop care, anticipate possible discharge home tomorrow.    Adilene Rea MD

## 2018-07-01 NOTE — PLAN OF CARE
Problem: Patient Care Overview  Goal: Plan of Care/Patient Progress Review  PT: Per chart review and discussion with OT pt has no acute PT needs. Will complete orders.

## 2018-07-01 NOTE — PROGRESS NOTES
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 Daugther and sister assist with meal prep, laundry, cleaning, grocery shopping, and transportation. Pt reports they take turns spending the night as patient's home to assist as needed     Self-Care   Dominant Hand right   Usual Activity Tolerance good   Current Activity Tolerance fair   Regular Exercise no   Equipment Currently Used at Home shower chair;grab bar;cane, straight;walker, rolling;wheelchair, manual  (toilet safety frame, \"Hurrycane\", 4WW)   Activity/Exercise/Self-Care Comment Prior to February when pt got diagnosed with cancer, pt IND in all I/ADLs and no AE used. Since pt with increased deconditioning impacting IND. Pt reports using cane vs 4WW for mobility pending strength/energy levels and uses w/c for longer distances in community.    Functional Level Prior   Ambulation 1-->assistive equipment   Transferring 1-->assistive equipment   Toileting 1-->assistive equipment   Bathing 1-->assistive equipment   Dressing 0-->independent   Eating 0-->independent   Communication 0-->understands/communicates without difficulty   Swallowing 0-->swallows foods/liquids without difficulty   Cognition 0 - no cognition issues reported   Fall history within last six months yes   Number of times patient has fallen within last six months 2  (on ice and tangled up in pants " "when getting dressed)   Which of the above functional risks had a recent onset or change? fall history;ambulation;dressing   Prior Functional Level Comment Pt has maintained IND in ADLs with use of AE; increased assist for IADLs given fatigue and weakness   General Information   Onset of Illness/Injury or Date of Surgery - Date 06/29/18   Referring Physician Tammy Lopez MD   Patient/Family Goals Statement \"go home\", be with family   Additional Occupational Profile Info/Pertinent History of Current Problem \"57 year old female stage IV endometrial cancer, transferred [place] [facility] [place] jejunal repair due to bowel perforation\"   Precautions/Limitations fall precautions;abdominal precautions   Weight-Bearing Status - LUE partial weight-bearing (% in comments)  (<10#)   Weight-Bearing Status - RUE partial weight-bearing (% in comments)  (<10#)   Weight-Bearing Status - LLE full weight-bearing   Weight-Bearing Status - RLE full weight-bearing   General Info Comments Activity: up ad boone   Cognitive Status Examination   Orientation orientation to person, place and time   Cognitive Comment Denies any changes; no concerns noted with evaluation   Visual Perception   Visual Perception Wears glasses   Visual Perception Comments Stable since Feb/March when first diagnosed with diabetes and blood sugars have been better controlled   Sensory Examination   Sensory Comments Numbness/tingling in toes and fingertips   Pain Assessment   Patient Currently in Pain No   Integumentary/Edema   Integumentary/Edema no deficits were identifed   Posture   Posture forward head position;protracted shoulders   Range of Motion (ROM)   ROM Comment BUE ROM WFL   Strength   Strength Comments MMT: not formally tested 2/2 abdominal precautions. Post-op deconditioning noted as well as pt report of decrease with chemotherapy   Coordination   Upper Extremity Coordination No deficits were identified   Mobility   Bed Mobility Bed mobility skill: Supine " to sit   Bed Mobility Skill: Supine to Sit   Level of [place]: Supine/Sit minimum assist (75% patients effort)   Physical Assist/Nonphysical Assist: Supine/Sit verbal cues   Assistive Device: Supine/Sit bedrail   Transfer Skill: Sit to Stand   Level of [place]: Sit/Stand contact guard   Assistive Device for Transfer: Sit/Stand straight cane   Transfer Skill: Toilet Transfer   Level of [place]: Toilet stand-by assist   Assistive Device grab bars   Tub/Shower Transfer   Tub/Shower Transfer Transfer Skill: Tub/Shower Transfers   Transfer Skill: Tub/Shower Transfer   Level of [place]: Tub/Shower stand-by assist   Physical Assist/Nonphysical Assist: Tub/Shower verbal cues   Assistive Device grab bars   Toileting   Level of [place]: Toilet stand-by assist   Instrumental Activities of Daily Living (IADL)   Previous Responsibilities meal prep;medication management;driving   Activities of Daily Living Analysis   Impairments Contributing to Impaired Activities of Daily Living balance impaired;strength decreased;post surgical precautions   General Therapy Interventions   Planned Therapy Interventions ADL retraining;IADL retraining;bed mobility training;strengthening;transfer training;risk factor education;progressive activity/exercise   Clinical Impression   Criteria for Skilled Therapeutic Interventions Met yes, treatment indicated   OT Diagnosis decreased IND in ADLs   Influenced by the following impairments post-surgical precautions, weakness, fatigue   Assessment of Occupational Performance 1-3 Performance Deficits   Identified Performance Deficits dressing, bathing, functional mobility   Clinical Decision Making (Complexity) Low complexity   Therapy Frequency daily   Predicted Duration of Therapy Intervention (days/wks) 3 days   Anticipated Discharge Disposition Home with Outpatient Therapy;Home with Assist   Risks and Benefits of Treatment have been explained. Yes   Patient, Family & other staff  "in agreement with plan of care Yes   [facility] TM \"6 Clicks\"   2016, Trustees of [facility], under license to [employer].  All rights reserved.   6 Clicks Short Forms Daily Activity Inpatient Short Form   [facility]  \"6 Clicks\" Daily Activity Inpatient Short Form   1. Putting on and taking off regular lower body clothing? 3 - A Little   2. Bathing (including washing, rinsing, drying)? 3 - A Little   3. Toileting, which includes using toilet, bedpan or urinal? 4 - None   4. Putting on and taking off regular upper body clothing? 3 - A Little   5. Taking care of personal grooming such as brushing teeth? 4 - None   6. Eating meals? 4 - None   Daily Activity Raw Score (Score out of 24.Lower scores equate to lower levels of function) 21   Total Evaluation Time   Total Evaluation Time (Minutes) 10     "

## 2018-07-02 ENCOUNTER — APPOINTMENT (OUTPATIENT)
Dept: OCCUPATIONAL THERAPY | Facility: CLINIC | Age: 58
DRG: 755 | End: 2018-07-02
Attending: OBSTETRICS & GYNECOLOGY
Payer: COMMERCIAL

## 2018-07-02 VITALS
OXYGEN SATURATION: 96 % | TEMPERATURE: 99.8 F | WEIGHT: 192.8 LBS | RESPIRATION RATE: 16 BRPM | BODY MASS INDEX: 34.43 KG/M2 | DIASTOLIC BLOOD PRESSURE: 89 MMHG | SYSTOLIC BLOOD PRESSURE: 153 MMHG | HEART RATE: 99 BPM

## 2018-07-02 LAB
ANISOCYTOSIS BLD QL SMEAR: SLIGHT
BASOPHILS # BLD AUTO: 0 10E9/L (ref 0–0.2)
BASOPHILS NFR BLD AUTO: 0 %
DIFFERENTIAL METHOD BLD: ABNORMAL
EOSINOPHIL # BLD AUTO: 0 10E9/L (ref 0–0.7)
EOSINOPHIL NFR BLD AUTO: 0 %
ERYTHROCYTE [DISTWIDTH] IN BLOOD BY AUTOMATED COUNT: 15.7 % (ref 10–15)
GLUCOSE BLDC GLUCOMTR-MCNC: 112 MG/DL (ref 70–99)
HCT VFR BLD AUTO: 28 % (ref 35–47)
HGB BLD-MCNC: 9.1 G/DL (ref 11.7–15.7)
LYMPHOCYTES # BLD AUTO: 0.9 10E9/L (ref 0.8–5.3)
LYMPHOCYTES NFR BLD AUTO: 34 %
MCH RBC QN AUTO: 30 PG (ref 26.5–33)
MCHC RBC AUTO-ENTMCNC: 32.5 G/DL (ref 31.5–36.5)
MCV RBC AUTO: 92 FL (ref 78–100)
MONOCYTES # BLD AUTO: 1.2 10E9/L (ref 0–1.3)
MONOCYTES NFR BLD AUTO: 44 %
NEUTROPHILS # BLD AUTO: 0.6 10E9/L (ref 1.6–8.3)
NEUTROPHILS NFR BLD AUTO: 22 %
PLATELET # BLD AUTO: 179 10E9/L (ref 150–450)
POTASSIUM SERPL-SCNC: 3.6 MMOL/L (ref 3.4–5.3)
RBC # BLD AUTO: 3.03 10E12/L (ref 3.8–5.2)
WBC # BLD AUTO: 2.7 10E9/L (ref 4–11)

## 2018-07-02 PROCEDURE — 25000132 ZZH RX MED GY IP 250 OP 250 PS 637: Performed by: STUDENT IN AN ORGANIZED HEALTH CARE EDUCATION/TRAINING PROGRAM

## 2018-07-02 PROCEDURE — 40000133 ZZH STATISTIC OT WARD VISIT

## 2018-07-02 PROCEDURE — 97535 SELF CARE MNGMENT TRAINING: CPT | Mod: GO

## 2018-07-02 PROCEDURE — 36416 COLLJ CAPILLARY BLOOD SPEC: CPT | Performed by: STUDENT IN AN ORGANIZED HEALTH CARE EDUCATION/TRAINING PROGRAM

## 2018-07-02 PROCEDURE — 25000132 ZZH RX MED GY IP 250 OP 250 PS 637: Performed by: OBSTETRICS & GYNECOLOGY

## 2018-07-02 PROCEDURE — 99238 HOSP IP/OBS DSCHRG MGMT 30/<: CPT | Mod: GC | Performed by: OBSTETRICS & GYNECOLOGY

## 2018-07-02 PROCEDURE — 84132 ASSAY OF SERUM POTASSIUM: CPT | Performed by: STUDENT IN AN ORGANIZED HEALTH CARE EDUCATION/TRAINING PROGRAM

## 2018-07-02 PROCEDURE — 85025 COMPLETE CBC W/AUTO DIFF WBC: CPT | Performed by: STUDENT IN AN ORGANIZED HEALTH CARE EDUCATION/TRAINING PROGRAM

## 2018-07-02 PROCEDURE — 00000146 ZZHCL STATISTIC GLUCOSE BY METER IP

## 2018-07-02 RX ORDER — ACETAMINOPHEN 325 MG/1
650 TABLET ORAL EVERY 4 HOURS PRN
Qty: 100 TABLET | Refills: 0 | Status: SHIPPED | OUTPATIENT
Start: 2018-07-02 | End: 2018-11-08

## 2018-07-02 RX ORDER — POLYETHYLENE GLYCOL 3350 17 G/17G
1 POWDER, FOR SOLUTION ORAL DAILY PRN
Qty: 510 G | Refills: 0 | Status: SHIPPED | OUTPATIENT
Start: 2018-07-02 | End: 2018-10-17

## 2018-07-02 RX ORDER — OXYCODONE HYDROCHLORIDE 5 MG/1
5-10 TABLET ORAL EVERY 4 HOURS PRN
Qty: 30 TABLET | Refills: 0 | Status: SHIPPED | OUTPATIENT
Start: 2018-07-02 | End: 2018-10-17

## 2018-07-02 RX ADMIN — OXYCODONE HYDROCHLORIDE 10 MG: 5 TABLET ORAL at 09:41

## 2018-07-02 RX ADMIN — ASPIRIN 81 MG CHEWABLE TABLET 81 MG: 81 TABLET CHEWABLE at 08:17

## 2018-07-02 RX ADMIN — DOCUSATE SODIUM 100 MG: 100 CAPSULE, LIQUID FILLED ORAL at 08:17

## 2018-07-02 RX ADMIN — POTASSIUM CHLORIDE 20 MEQ: 750 TABLET, EXTENDED RELEASE ORAL at 06:25

## 2018-07-02 RX ADMIN — OXYCODONE HYDROCHLORIDE 5 MG: 5 TABLET ORAL at 03:26

## 2018-07-02 ASSESSMENT — PAIN DESCRIPTION - DESCRIPTORS
DESCRIPTORS: ACHING
DESCRIPTORS: ACHING;SORE

## 2018-07-02 NOTE — PLAN OF CARE
Problem: Patient Care Overview  Goal: Plan of Care/Patient Progress Review  OT 7C:   Discharge Planner OT   Patient plan for discharge: home with family   Current status: Pt declining OOR activity, reporting plans to d/c soon. Pt able to recall 2/3 abdominal precautions; reinforced education in relation to daily activities. Educated on safe stair completion and car transfer. Min A bed mobility supine > sit within precautions. SBA for UB/LB dressing and functional mobility within room to toilet; mod IND for clothing management and pericares.   Barriers to return to prior living situation: abdominal precautions, deconditioning   Recommendations for discharge: Home with assist and outpatient PT  Rationale for recommendations: Pt reports 24/7 assist shared between her daughter and sister. Pt with good insight into deficits and appropriate modifications to ensure safety. Outpatient PT recommended to promote functional strength, endurance, and balance.        Entered by: Angela Conley 07/02/2018 10:19 AM         Occupational Therapy Discharge Summary    Reason for therapy discharge:    Discharged to home with outpatient therapy.    Progress towards therapy goal(s). See goals on Care Plan in [facility] electronic health record for goal details.  Goals partially met.  Barriers to achieving goals:   discharge from facility.    Therapy recommendation(s):    see above.

## 2018-07-02 NOTE — PROGRESS NOTES
Went over discharge instructions and medications with pt. Answered all questions at this time. Removed PIV. Pt left with daughter in a wheelchair to go down to the pharmacy and the front door. Adequate for discharge.

## 2018-07-02 NOTE — PROGRESS NOTES
Brief Progress Note    Asked by RN to prescribe Miralax for patient. After discussion with Fellow/Staff, plan to avoid Miralax as we were not in the surgery. In to explain to patient that we want to be careful not to stimulate her bowels too much. She is ok with plan to continue with Colace only. Had a small BM earlier today but feels like she needs to have more. She has tolerated small amounts of regular diet today. Taking it slow. Trying to increase water intake. She and RN are curious if LUE line is PIV or PICC as it has a biopatch. I called [facility], and RN there said it is documented it as 20 gauge PIV; sometimes they put biopatch on PIV.    Amina Wilkerson MD  PGY-2 Department of Obstetrics, Gynecology, and Women's Health  07/01/18

## 2018-07-02 NOTE — PLAN OF CARE
Problem: Patient Care Overview  Goal: Plan of Care/Patient Progress Review  Outcome: Improving  Assumed care of pt from [id number]. AVSS. A&Ox4. Apical pulse regular. Lungs CTA. Abdominal incision c/d/i. Bowel sounds active in all quadrants. Tolerating regular diet. Voiding spontaneously with adequate output. PIV saline locked. Pain controlled with PO Oxycodone. Potassium replaced, recheck in AM but pt will be discharging home to [facility] today. Continue with POC.

## 2018-07-02 NOTE — DISCHARGE SUMMARY
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, undergoing neoadjuvant chemotherapy   - Jejunal perforation s/p repair  - Hypertension  - Type 2 DM  - Anemia of chronic disease  - Neutropenia  - Hypokalemia, resolved  - Hypomagnesemia, resolved      Patient Active Problem List   Diagnosis     Bowel perforation (H)       Procedures:   - NGT removal     Prior to Admission Medications:  Prescriptions Prior to Admission   Medication Sig Dispense Refill Last Dose     aspirin 81 MG chewable tablet 81 mg   Past Week at Unknown time     Blood Glucose Monitoring Suppl (BLOOD GLUCOSE MONITOR SYSTEM) W/DEVICE KIT 1 Kit   Unknown     docusate sodium (COLACE) 100 MG capsule 200 mg   Past Week at Unknown time     Glucose Blood (BLOOD GLUCOSE TEST STRIPS) STRP 1 strip   Unknown     HYDROcodone-acetaminophen (NORCO) 5-325 MG per tablet 2 po bid prn   Past Week at Unknown time     insulin aspart (NOVOLOG PEN) 100 UNIT/ML injection 4-15 Units   Past Week at Unknown time     insulin glargine (LANTUS) 100 UNIT/ML injection 20 Units   6/29/2018 at Unknown time     Insulin Pen Needle (PEN NEEDLES 5/16\") 30G X 8 MM MISC Use as directed   Unknown     Lancets MISC 1 each   Unknown     metFORMIN (GLUCOPHAGE) 1000 MG tablet 1,000 mg   Past Week at Unknown time     ondansetron (ZOFRAN-ODT) 4 MG ODT tab DISSOLVE ONE TABLET BY MOUTH EVERY 4 HOURS AS NEEDED FOR NAUSEA   Past Month at Unknown time     prochlorperazine (COMPAZINE) 10 MG tablet 10 mg   Past Month at Unknown time     atorvastatin (LIPITOR) 20 MG tablet 20 mg " "  Not Taking     LORazepam (ATIVAN) 0.5 MG tablet Take 1 tablet (0.5 mg) by mouth daily as needed for anxiety (Take one tablet now. Repeat in 20 minutes if needed) (Patient not taking: Reported on 6/20/2018) 2 tablet 0 Not Taking     losartan-hydrochlorothiazide (HYZAAR) 100-12.5 MG per tablet 1 tablet   Not Taking     naproxen sodium (ANAPROX) 220 MG tablet 220 mg   Not Taking     OLANZapine (ZYPREXA) 10 MG tablet    Not Taking     promethazine-codeine (PHENERGAN WITH CODEINE) 6.25-10 MG/5ML syrup 10 mLs   More than a month at Unknown time       Discharge Medications:   Manda Santacruz   Home Medication Instructions JUN:[id number]    Printed on:07/02/18 0830   Medication Information                      acetaminophen (TYLENOL) 325 MG tablet  Take 2 tablets (650 mg) by mouth every 4 hours as needed for mild pain             aspirin 81 MG chewable tablet  81 mg             atorvastatin (LIPITOR) 20 MG tablet  20 mg             Blood Glucose Monitoring Suppl (BLOOD GLUCOSE MONITOR SYSTEM) W/DEVICE KIT  1 Kit             docusate sodium (COLACE) 100 MG capsule  200 mg             enoxaparin (LOVENOX) 40 MG/0.4ML injection  Inject 0.4 mLs (40 mg) Subcutaneous every 24 hours             Glucose Blood (BLOOD GLUCOSE TEST STRIPS) STRP  1 strip             insulin aspart (NOVOLOG PEN) 100 UNIT/ML injection  4-15 Units             insulin glargine (LANTUS SOLOSTAR) 100 UNIT/ML pen  Inject 20 Units Subcutaneous At Bedtime HOLD UNTIL YOU FOLLOW UP WITH YOUR PCP             Insulin Pen Needle (PEN NEEDLES 5/16\") 30G X 8 MM MISC  Use as directed             Lancets MISC  1 each             LORazepam (ATIVAN) 0.5 MG tablet  Take 1 tablet (0.5 mg) by mouth daily as needed for anxiety (Take one tablet now. Repeat in 20 minutes if needed)             losartan-hydrochlorothiazide (HYZAAR) 100-12.5 MG per tablet  1 tablet             metFORMIN (GLUCOPHAGE) 1000 MG tablet  Take 1 tablet (1,000 mg) by mouth HOLD UNTIL YOU FOLLOW UP " WITH YOUR PCP             ondansetron (ZOFRAN-ODT) 4 MG ODT tab  DISSOLVE ONE TABLET BY MOUTH EVERY 4 HOURS AS NEEDED FOR NAUSEA             oxyCODONE IR (ROXICODONE) 5 MG tablet  Take 1-2 tablets (5-10 mg) by mouth every 4 hours as needed for moderate to severe pain             polyethylene glycol (MIRALAX) powder  Take 17 g (1 capful) by mouth daily as needed for constipation             prochlorperazine (COMPAZINE) 10 MG tablet  10 mg                 Consultations:  - PT/OT    Brief History of Illness:  Manda Santacruz is a 57 year old with Stage IV serous uterine cancer who is a transfer of care from [facility]. She is POD#1 from XL, extensive ALIDA, and repair of proximal jejunal perforation. She initially presented to the ED in [place] on 6/27 with increased abdominal pain. CT scan diagnosed SBO with jejunal perforation and associated abscess. Due to lack of bed availability at [facility], the patient was transferred to [facility] for management of bowel perforation. On 6/28/18, she underwent XL with extensive ALIDA and repair of proximal jejunal perforation. She was admitted to the SICU and treated with IV Zosyn for concern for infection and once she was stable was transferred to [facility] for post-operative care.    Hospital Course:  Dz:   - Stage IV endometrial cancer, s/p 3 cycles of neoadjuvant Carbo/Taxol (4/2018-6/2018.) CT scan 6/20/2018 with residual disease. Plan for continued neoadjuvent therapy after discharge with interval debulking.   FEN:   - She was maintained on IVF until POD#2 when her NG tube was removed and she was tolerating PO intake. Her diet was advanced as tolerated.  By discharge, she was tolerating a regular diet without nausea and vomiting and able to maintain her hydration without IVF supplementation. She was hypomagnesemic and hypokalemic and improved with supplementation per electrolyte replacement protocol.  Pain:   - Her pain was initially controlled on a dilaudid IV, Acetaminophen, and Roxicodone  PRN.  Once tolerating PO pain meds, she was transitioned to a PO pain regimen.  Her pain was well controlled on this and she was discharged home with these medications.  CV:   - She has a history of HTN and takes Hyzaar PRN at home. Her vital signs were stable while in house and she had no acute CV issues.  PULM:   - She has no history of pulmonary issues. She had no acute pulmonary issues while in house.  HEME:   - Her hemoglobin was 8.5 prior to transfer from [facility].  Her Hg on HD#2 was 8.4 postoperatively and was stable at 9.1 at the time of discharge.  Her WBC was 1.5 and ANC 0.78 prior to transfer from [facility]. Her labs were trended and nadired to WBC 1.4/ANC 0.3.  At the time of discharge, her WBC was 2.7 and ANC 0.6.   GI:   - Patient was transferred from [facility] with NG tube in place. NG tube was removed on POD#2 when she began passing flatus and had normal BM. Her diet was advanced to clears on POD#2 and then advanced to regular diet on POD#3. Patient tolerating a regular diet at time of discharge.   :     - She had no acute  issues while in house.  ID:   - Patient was started on zosyn at [facility] but was discontinued prior to transfer. The patient was AF during her hospitalization.  She was placed on neutropenic precautions.    ENDO:   - Patient with T2DM. Home metformin and lantus was held and medium sliding scale ordered. She was instructed to hold lantus and metformin on discharge, monitor glucose ACHS, and use home SSI dosing and follow up with PCP this week for hospital follow up and DM.   PSYCH/NEURO:   - no issues  PPX:    - She was given SCDs, IS, and lovenox during her hospital course.  She will be discharged with a 28 day course of lovenox    Discharge Instructions and Follow up:  Ms. Manda Santacruz was discharged from the hospital with follow up for endometrial cancer.    Discharge Diet: Regular  Discharge Activity: Activity as tolerated  Discharge Follow up: Follow up with PCP this week. Follow up  with Dr Rm in 2 weeks. Staple removal with PCP or gyn onc 10-14 days post op    # Discharge Pain Plan:  - During her hospitalization, Manda experienced pain due to post op state.  The pain plan for discharge was discussed with Manda and the plan was created in a collaborative fashion.    - Opioids prescribed on discharge: Oxycodone  - Duration of opioids after discharge: #30 tablets no refills  - Bowel regimen: miralax and docusate    Discharge Disposition:  Discharged to home    Discharge Staff: Dr Parveen Velez DNP  7/2/2018 8:42 AM    I have examined this patient personally with the team and agree with the above findings and plan.    Kael Saini

## 2018-07-05 ENCOUNTER — CARE COORDINATION (OUTPATIENT)
Dept: ONCOLOGY | Facility: CLINIC | Age: 58
End: 2018-07-05

## 2018-07-05 NOTE — PROGRESS NOTES
RN reached out to patient for post hospital call. Patient had surgery at [facility] and was transferred to [facility].     Post-Discharge Phone Call:    Pain:  1) Location: Abdominal   2) Rate pain on scale: Tolerated   3) Is your pain well controlled on your pain medication?: Yes  4) How often are you taking your pain medication?: Only as needed     GI:  5) Last bowel movement: Today   6) Are you having regular bowel movements? Yes  7) Eating/drinking well?: Yes but does state she gets full fast. RN and patient discussed this in detail  8) Nausea?: No    Urinary:  9) Are you having problems or difficulty with urination? No      Lower Extremities:  10) Were lymph nodes removed during surgery?  N/A  11) If yes, have you been offered a lymphedema consult appointment?  N/A  12) Any pain, redness, or swelling in legs?  No  13) Any area on your legs that are warm to touch? No  14) Chest pain or severe shortness of breath? No    Wound:  15) Type of incision: Abdominal   Any of the following:  - Drainage (color, amount): No  - Odor: No  - Redness: No  - Chills: No  - Fever: No  16) Staples - Have you had your staples out yet? (staples should be removed 7-10 days post-op): Yes. She will see PCP closer to home   17) Pt was reminded to wash incision (allow warm, soapy water to run over incision): Yes    Post-op:  18) Verify date and time of appointment: yes  19) Pt was informed that pathology will be discussed at this appointment N/A    Any other questions or concerns at this time? No     Lakisha Castillo RN

## 2018-07-10 ENCOUNTER — TELEPHONE (OUTPATIENT)
Dept: ONCOLOGY | Facility: CLINIC | Age: 58
End: 2018-07-10

## 2018-07-10 DIAGNOSIS — K63.1 PERFORATION OF INTESTINE (H): Primary | ICD-10-CM

## 2018-07-10 DIAGNOSIS — C54.1 ENDOMETRIAL CANCER (H): ICD-10-CM

## 2018-07-10 NOTE — TELEPHONE ENCOUNTER
"Patient's daughter called with many questions and concerns.    1. Could the bowel perforation happen again  2. Does patient still need chemotherapy? [facility] MD stated the cancer was all gone\" in her abdomin.   3. Should she have a CT scan  4. If she needs chemotherapy should she have it here  5.Patient is having a hard time with side effects of chemo and surgery    MD was updated on the concerns and questions above.     CT scan ordered. Daughter updated on what could be answered via phone.     Patients daughter encouraged to write questions down and liu them with to the follow up appointment.     Daughter very appreciative of the RN time and the above information.     Lakisha Castillo RN          "

## 2018-07-25 ENCOUNTER — ONCOLOGY VISIT (OUTPATIENT)
Dept: ONCOLOGY | Facility: CLINIC | Age: 58
End: 2018-07-25
Attending: OBSTETRICS & GYNECOLOGY
Payer: COMMERCIAL

## 2018-07-25 VITALS
BODY MASS INDEX: 30.43 KG/M2 | SYSTOLIC BLOOD PRESSURE: 128 MMHG | TEMPERATURE: 97.6 F | WEIGHT: 170.4 LBS | DIASTOLIC BLOOD PRESSURE: 90 MMHG | HEART RATE: 97 BPM | OXYGEN SATURATION: 98 %

## 2018-07-25 DIAGNOSIS — C54.1 ENDOMETRIAL CANCER (H): Primary | ICD-10-CM

## 2018-07-25 LAB
BASOPHILS # BLD AUTO: 0 10E9/L (ref 0–0.2)
BASOPHILS NFR BLD AUTO: 0.4 %
DIFFERENTIAL METHOD BLD: ABNORMAL
EOSINOPHIL # BLD AUTO: 0 10E9/L (ref 0–0.7)
EOSINOPHIL NFR BLD AUTO: 0.7 %
ERYTHROCYTE [DISTWIDTH] IN BLOOD BY AUTOMATED COUNT: 16.3 % (ref 10–15)
HCT VFR BLD AUTO: 30.6 % (ref 35–47)
HGB BLD-MCNC: 9.8 G/DL (ref 11.7–15.7)
IMM GRANULOCYTES # BLD: 0 10E9/L (ref 0–0.4)
IMM GRANULOCYTES NFR BLD: 0.4 %
LYMPHOCYTES # BLD AUTO: 1.6 10E9/L (ref 0.8–5.3)
LYMPHOCYTES NFR BLD AUTO: 36 %
MCH RBC QN AUTO: 30.2 PG (ref 26.5–33)
MCHC RBC AUTO-ENTMCNC: 32 G/DL (ref 31.5–36.5)
MCV RBC AUTO: 94 FL (ref 78–100)
MONOCYTES # BLD AUTO: 0.5 10E9/L (ref 0–1.3)
MONOCYTES NFR BLD AUTO: 11.3 %
NEUTROPHILS # BLD AUTO: 2.3 10E9/L (ref 1.6–8.3)
NEUTROPHILS NFR BLD AUTO: 51.2 %
NRBC # BLD AUTO: 0 10*3/UL
NRBC BLD AUTO-RTO: 0 /100
PLATELET # BLD AUTO: 563 10E9/L (ref 150–450)
RBC # BLD AUTO: 3.24 10E12/L (ref 3.8–5.2)
WBC # BLD AUTO: 4.5 10E9/L (ref 4–11)

## 2018-07-25 PROCEDURE — G0463 HOSPITAL OUTPT CLINIC VISIT: HCPCS | Mod: ZF

## 2018-07-25 PROCEDURE — 85025 COMPLETE CBC W/AUTO DIFF WBC: CPT | Performed by: OBSTETRICS & GYNECOLOGY

## 2018-07-25 PROCEDURE — 40000556 ZZH STATISTIC PERIPHERAL IV START W US GUIDANCE: Mod: ZF

## 2018-07-25 PROCEDURE — 99214 OFFICE O/P EST MOD 30 MIN: CPT | Mod: ZP | Performed by: OBSTETRICS & GYNECOLOGY

## 2018-07-25 PROCEDURE — 25000125 ZZHC RX 250: Mod: ZF | Performed by: RADIOLOGY

## 2018-07-25 RX ORDER — OLANZAPINE 10 MG/1
TABLET ORAL
COMMUNITY
Start: 2018-06-22 | End: 2018-11-08

## 2018-07-25 RX ORDER — DEXAMETHASONE 4 MG/1
TABLET ORAL
COMMUNITY
Start: 2018-06-22 | End: 2018-11-08

## 2018-07-25 RX ORDER — ETHYL CHLORIDE 100 %
AEROSOL, SPRAY (ML) TOPICAL ONCE
Status: COMPLETED | OUTPATIENT
Start: 2018-07-25 | End: 2018-07-25

## 2018-07-25 RX ORDER — HYDROCODONE BITARTRATE AND ACETAMINOPHEN 5; 325 MG/1; MG/1
TABLET ORAL
COMMUNITY
Start: 2018-07-19 | End: 2018-11-08

## 2018-07-25 RX ORDER — CLINDAMYCIN HCL 300 MG
CAPSULE ORAL
COMMUNITY
Start: 2018-07-13 | End: 2018-11-08

## 2018-07-25 RX ADMIN — Medication: at 09:54

## 2018-07-25 ASSESSMENT — PAIN SCALES - GENERAL: PAINLEVEL: NO PAIN (0)

## 2018-07-25 NOTE — NURSING NOTE
"Oncology Rooming Note    July 25, 2018 7:16 AM   Manda Santacruz is a 58 year old female who presents for:    Chief Complaint   Patient presents with     Oncology Clinic Visit     Endometrial CA, Return     Initial Vitals: /90  Pulse 97  Temp 97.6  F (36.4  C) (Oral)  Wt 77.3 kg (170 lb 6.4 oz)  SpO2 98%  BMI 30.43 kg/m2 Estimated body mass index is 30.43 kg/(m^2) as calculated from the following:    Height as of 6/20/18: 1.594 m (5' 2.75\").    Weight as of this encounter: 77.3 kg (170 lb 6.4 oz). Body surface area is 1.85 meters squared.  No Pain (0) Comment: Data Unavailable   No LMP recorded. Patient has had a hysterectomy.  Allergies reviewed: Yes  Medications reviewed: Yes    Medications: Medication refills not needed today.  Pharmacy name entered into EPIC: Data Unavailable    Clinical concerns: Patient states there are no new concerns to discuss with provider.  Dr Rm was not notified.       10 minutes for nursing intake (face to face time)     Juliet Adrian CMA              "

## 2018-07-25 NOTE — MR AVS SNAPSHOT
After Visit Summary   7/25/2018    Manda Santacruz    MRN: [id number]           Patient Information     Date Of Birth          1960        Visit Information        Provider Department      7/25/2018 7:20 AM Prabhjot Rm MD [facility]        Today's Diagnoses     Endometrial cancer (H)    -  1       Follow-ups after your visit        Who to contact     If you have questions or need follow up information about today's clinic visit or your schedule please contact [facility] directly at [phone].  Normal or non-critical lab and imaging results will be communicated to you by MyChart, letter or phone within 4 business days after the clinic has received the results. If you do not hear from us within 7 days, please contact the clinic through MyChart or phone. If you have a critical or abnormal lab result, we will notify you by phone as soon as possible.  Submit refill requests through [employer] or call your pharmacy and they will forward the refill request to us. Please allow 3 business days for your refill to be completed.          Additional Information About Your Visit        Care EveryWhere ID     This is your Care EveryWhere ID. This could be used by other organizations to access your [facility] medical records  [id number]        Your Vitals Were     Pulse Temperature Pulse Oximetry BMI (Body Mass Index)          97 97.6  F (36.4  C) (Oral) 98% 30.43 kg/m2         Blood Pressure from Last 3 Encounters:   No data found for BP    Weight from Last 3 Encounters:   No data found for Wt              Today, you had the following     No orders found for display      Information about OPIOIDS     PRESCRIPTION OPIOIDS: WHAT YOU NEED TO KNOW   We gave you an opioid (narcotic) pain medicine. It is important to manage your pain, but opioids are not always the best choice. You should first try all the other options your care team gave you. Take this medicine for  as short a time (and as few doses) as possible.     These medicines have risks:    DO NOT drive when on new or higher doses of pain medicine. These medicines can affect your alertness and reaction times, and you could be arrested for driving under the influence (DUI). If you need to use opioids long-term, talk to your care team about driving.    DO NOT operate heave machinery    DO NOT do any other dangerous activities while taking these medicines.     DO NOT drink any alcohol while taking these medicines.      If the opioid prescribed includes acetaminophen, DO NOT take with any other medicines that contain acetaminophen. Read all labels carefully. Look for the word  acetaminophen  or  Tylenol.  Ask your pharmacist if you have questions or are unsure.    You can get addicted to pain medicines, especially if you have a history of addiction (chemical, alcohol or substance dependence). Talk to your care team about ways to reduce this risk.    Store your pills in a secure place, locked if possible. We will not replace any lost or stolen medicine. If you don t finish your medicine, please throw away (dispose) as directed by your pharmacist. The [place] Pollution Control Agency has more information about safe disposal: https://www.pca.[facility].[place].us/living-green/managing-unwanted-medications.     All opioids tend to cause constipation. Drink plenty of water and eat foods that have a lot of fiber, such as fruits, vegetables, prune juice, apple juice and high-fiber cereal. Take a laxative (Miralax, milk of magnesia, Colace, Senna) if you don t move your bowels at least every other day.          Primary Care Provider Office Phone # Fax #    [facility]onte [facility] [phone]       [address] [address]        Equal Access to Services     [facility] SUSIE AH: Montse Faulkner, marilyn sosa, moy ambriz. So [facility]  [phone].    ATENCIÓN: Si geovani lockhart, tiene a roach disposición servicios gratuitos de asistencia lingüística. Juliana rizvi [phone].    We comply with applicable federal civil rights laws and [place] laws. We do not discriminate on the basis of race, color, national origin, age, disability, sex, sexual orientation, or gender identity.            Thank you!     Thank you for choosing [facility]  for your care. Our goal is always to provide you with excellent care. Hearing back from our patients is one way we can continue to improve our services. Please take a few minutes to complete the written survey that you may receive in the mail after your visit with us. Thank you!             Your Updated Medication List - Protect others around you: Learn how to safely use, store and throw away your medicines at www.disposemymeds.org.          This list is accurate as of 7/25/18 11:59 PM.  Always use your most recent med list.                   Brand Name Dispense Instructions for use Diagnosis    acetaminophen 325 MG tablet    TYLENOL    100 tablet    Take 2 tablets (650 mg) by mouth every 4 hours as needed for mild pain    S/P exploratory laparotomy       aspirin 81 MG chewable tablet      81 mg        atorvastatin 20 MG tablet    LIPITOR     20 mg        Blood Glucose Monitor System w/Device Kit      1 Kit        BLOOD GLUCOSE TEST STRIPS Strp      1 strip        clindamycin 300 MG capsule    CLEOCIN          dexamethasone 4 MG tablet    DECADRON          docusate sodium 100 MG capsule    COLACE     200 mg        enoxaparin 40 MG/0.4ML injection    LOVENOX    24 Syringe    Inject 0.4 mLs (40 mg) Subcutaneous every 24 hours    S/P exploratory laparotomy       HYDROcodone-acetaminophen 5-325 MG per tablet    RICCARDO     Uses this instead of Oxycodone        insulin aspart 100 UNIT/ML injection    NovoLOG PEN     4-15 Units        insulin glargine 100 UNIT/ML injection    LANTUS     Inject 20 Units  "Subcutaneous At Bedtime HOLD UNTIL YOU FOLLOW UP WITH YOUR PCP    Type 2 diabetes mellitus with complication, unspecified long term insulin use status (H)       iohexol 140 MG/ML Soln solution    OMNIPAQUE    50 mL    Mix entire bottle (50ml) of contast with 600ml (20 ounces) of water and drink half 2 hrs prior to CT scan and half 1 hr prior to scan    Perforation of intestine (H)       Lancets Misc      1 each        LORazepam 0.5 MG tablet    ATIVAN    2 tablet    Take 1 tablet (0.5 mg) by mouth daily as needed for anxiety (Take one tablet now. Repeat in 20 minutes if needed)    Anxiety       losartan-hydrochlorothiazide 100-12.5 MG per tablet    HYZAAR     1 tablet        metFORMIN 1000 MG tablet    GLUCOPHAGE     Take 1 tablet (1,000 mg) by mouth HOLD UNTIL YOU FOLLOW UP WITH YOUR PCP    Type 2 diabetes mellitus with complication, unspecified long term insulin use status (H)       OLANZapine 10 MG tablet    zyPREXA          ondansetron 4 MG ODT tab    ZOFRAN-ODT     DISSOLVE ONE TABLET BY MOUTH EVERY 4 HOURS AS NEEDED FOR NAUSEA        oxyCODONE IR 5 MG tablet    ROXICODONE    30 tablet    Take 1-2 tablets (5-10 mg) by mouth every 4 hours as needed for moderate to severe pain    S/P exploratory laparotomy       Pen Needles 5/16\" 30G X 8 MM Misc      Use as directed        polyethylene glycol powder    MIRALAX    510 g    Take 17 g (1 capful) by mouth daily as needed for constipation    S/P exploratory laparotomy       prochlorperazine 10 MG tablet    COMPAZINE     10 mg          "

## 2018-07-25 NOTE — LETTER
2018       RE: Manda Santacruz  [address] [place] [id number]     Dear Colleague,    Thank you for referring your patient, Manda Santacruz, to the [facility]. Please see a copy of my visit note below.                Follow Up Notes on Referred Patient    Date: 2018       Dr. Neeru Segura MD  No address on file       RE: Manda Santacruz  : 1960  CULLEN: 2018    Dear Dr. Neeru Segura:    Manda Santacruz is a 58 year old woman with a diagnosis of stage IV serous uterine cancer.           Patient presents today for followup.  She has gotten 4 cycles of chemotherapy and then had a small bowel perforation that was seen and operated on at [place] with primary closure.  Appears to be most likely the prior site of disease.  Since then, she has been slow to recover but is feeling better now.  She is slowly increasing appetite, eating and drinking better.  Still fatigued overall but able to get around better.  Now does not have any nausea.  Did have an episode of vomiting earlier.  Otherwise has regular bowel function with bowel movement every day.  Is passing flatus regular.  No change in urinary habits. Otherwise, no B symptoms.  No vaginal bleeding or discharge.             Health Maintenance Due   Topic Date Due     PHQ-2 Q1 YR  1973     TETANUS IMMUNIZATION (SYSTEM ASSIGNED)  1979     HIV SCREEN (SYSTEM ASSIGNED)  1979     HEPATITIS C SCREENING  1979     PAP SCREENING Q3 YR (SYSTEM ASSIGNED)  1982     LIPID SCREEN Q5 YR FEMALE (SYSTEM ASSIGNED)  2006     MAMMO SCREEN Q2 YR (SYSTEM ASSIGNED)  2011     COLON CANCER SCREEN (SYSTEM ASSIGNED)  2011     ADVANCE DIRECTIVE PLANNING Q5 YRS  2016       Current Medications:     Current Outpatient Prescriptions   Medication Sig Dispense Refill     acetaminophen (TYLENOL) 325 MG tablet Take 2 tablets (650 mg) by mouth every 4 hours as needed for mild pain 100 tablet 0  "    aspirin 81 MG chewable tablet 81 mg       Blood Glucose Monitoring Suppl (BLOOD GLUCOSE MONITOR SYSTEM) W/DEVICE KIT 1 Kit       clindamycin (CLEOCIN) 300 MG capsule        dexamethasone (DECADRON) 4 MG tablet        docusate sodium (COLACE) 100 MG capsule 200 mg       Glucose Blood (BLOOD GLUCOSE TEST STRIPS) STRP 1 strip       HYDROcodone-acetaminophen (NORCO) 5-325 MG per tablet Uses this instead of Oxycodone       iohexol (OMNIPAQUE) 140 MG/ML SOLN solution Mix entire bottle (50ml) of contast with 600ml (20 ounces) of water and drink half 2 hrs prior to CT scan and half 1 hr prior to scan 50 mL 0     Lancets MISC 1 each       OLANZapine (ZYPREXA) 10 MG tablet        atorvastatin (LIPITOR) 20 MG tablet 20 mg       enoxaparin (LOVENOX) 40 MG/0.4ML injection Inject 0.4 mLs (40 mg) Subcutaneous every 24 hours (Patient not taking: Reported on 7/25/2018) 24 Syringe 0     insulin aspart (NOVOLOG PEN) 100 UNIT/ML injection 4-15 Units       insulin glargine (LANTUS SOLOSTAR) 100 UNIT/ML pen Inject 20 Units Subcutaneous At Bedtime HOLD UNTIL YOU FOLLOW UP WITH YOUR PCP       Insulin Pen Needle (PEN NEEDLES 5/16\") 30G X 8 MM MISC Use as directed       LORazepam (ATIVAN) 0.5 MG tablet Take 1 tablet (0.5 mg) by mouth daily as needed for anxiety (Take one tablet now. Repeat in 20 minutes if needed) (Patient not taking: Reported on 6/20/2018) 2 tablet 0     losartan-hydrochlorothiazide (HYZAAR) 100-12.5 MG per tablet 1 tablet       metFORMIN (GLUCOPHAGE) 1000 MG tablet Take 1 tablet (1,000 mg) by mouth HOLD UNTIL YOU FOLLOW UP WITH YOUR PCP       ondansetron (ZOFRAN-ODT) 4 MG ODT tab DISSOLVE ONE TABLET BY MOUTH EVERY 4 HOURS AS NEEDED FOR NAUSEA       oxyCODONE IR (ROXICODONE) 5 MG tablet Take 1-2 tablets (5-10 mg) by mouth every 4 hours as needed for moderate to severe pain (Patient not taking: Reported on 7/25/2018) 30 tablet 0     polyethylene glycol (MIRALAX) powder Take 17 g (1 capful) by mouth daily as needed for " constipation (Patient not taking: Reported on 7/25/2018) 510 g 0     prochlorperazine (COMPAZINE) 10 MG tablet 10 mg           Allergies:      No Known Allergies     Social History:     Social History   Substance Use Topics     Smoking status: Never Smoker     Smokeless tobacco: Never Used     Alcohol use Not on file       History   Drug Use Not on file             Physical Exam:     /90  Pulse 97  Temp 97.6  F (36.4  C) (Oral)  Wt 77.3 kg (170 lb 6.4 oz)  SpO2 98%  BMI 30.43 kg/m2  Body mass index is 30.43 kg/(m^2).    General Appearance: healthy and alert, no distress     Neurological: normal gait, no gross defects     Psychiatric: appropriate mood and affect                               ABDOMEN:  Soft, nontender.  Well-healed incision.  No organomegaly.           Assessment:    Manda Santacruz is a 58 year old woman with a diagnosis of stage IV serous uterine cancer.     A total of 30 minutes was spent with the patient, 25 minutes of which were spent in counseling the patient and/or treatment planning.      1.  Stage IV high-grade serous uterine cancer.     2.  Prior small bowel resection with primary closure.   3.  Anemia.      Had a long discussion with the patient as well as with her family.  We will repeat hemoglobin today.  If it is below 8, consider additional blood transfusions.  We will also repeat a CT scan chest, abdomen and pelvis for disease status.  If she has increased disease, we would need to start chemotherapy sooner than later.  We will still plan for 2 more cycles of carbo-paclitaxel with carbo AUC of 6 and paclitaxel dose of 175 mg/m2.  If there is stable disease, we can wait up to like a month to give her a little more strength and time to recover.  If there is progression, we will want to start within the next 2 weeks or so.  We will see her back after the additional 2 cycles and CT chest, abdomen and pelvis.  The patient agrees with the plan.  She is very appreciative of her  care.  All questions were answered.       Prabhjot Rm MD, MS    Department of Obstetrics and Gynecology   Division of Gynecologic Oncology   [facility]  Phone: [phone]      CC  Patient Care Team:  Dillon, Devonte Quiroz as PCP - General  SELF, REFERRED   Result reviewed by MD.    Again, thank you for allowing me to participate in the care of your patient.      Sincerely,    Prabhjot Rm MD

## 2018-07-25 NOTE — PROGRESS NOTES
Follow Up Notes on Referred Patient    Date: 2018       Dr. Neeru Segura MD  No address on file       RE: Manda Santacruz  : 1960  CULLEN: 2018    Dear Dr. Neeru Segura:    Manda Santacruz is a 58 year old woman with a diagnosis of stage IV serous uterine cancer.           Patient presents today for followup.  She has gotten 4 cycles of chemotherapy and then had a small bowel perforation that was seen and operated on at [place] with primary closure.  Appears to be most likely the prior site of disease.  Since then, she has been slow to recover but is feeling better now.  She is slowly increasing appetite, eating and drinking better.  Still fatigued overall but able to get around better.  Now does not have any nausea.  Did have an episode of vomiting earlier.  Otherwise has regular bowel function with bowel movement every day.  Is passing flatus regular.  No change in urinary habits. Otherwise, no B symptoms.  No vaginal bleeding or discharge.             Health Maintenance Due   Topic Date Due     PHQ-2 Q1 YR  1973     TETANUS IMMUNIZATION (SYSTEM ASSIGNED)  1979     HIV SCREEN (SYSTEM ASSIGNED)  1979     HEPATITIS C SCREENING  1979     PAP SCREENING Q3 YR (SYSTEM ASSIGNED)  1982     LIPID SCREEN Q5 YR FEMALE (SYSTEM ASSIGNED)  2006     MAMMO SCREEN Q2 YR (SYSTEM ASSIGNED)  2011     COLON CANCER SCREEN (SYSTEM ASSIGNED)  2011     ADVANCE DIRECTIVE PLANNING Q5 YRS  2016       Current Medications:     Current Outpatient Prescriptions   Medication Sig Dispense Refill     acetaminophen (TYLENOL) 325 MG tablet Take 2 tablets (650 mg) by mouth every 4 hours as needed for mild pain 100 tablet 0     aspirin 81 MG chewable tablet 81 mg       Blood Glucose Monitoring Suppl (BLOOD GLUCOSE MONITOR SYSTEM) W/DEVICE KIT 1 Kit       clindamycin (CLEOCIN) 300 MG capsule        dexamethasone (DECADRON) 4 MG tablet        docusate sodium  "(COLACE) 100 MG capsule 200 mg       Glucose Blood (BLOOD GLUCOSE TEST STRIPS) STRP 1 strip       HYDROcodone-acetaminophen (NORCO) 5-325 MG per tablet Uses this instead of Oxycodone       iohexol (OMNIPAQUE) 140 MG/ML SOLN solution Mix entire bottle (50ml) of contast with 600ml (20 ounces) of water and drink half 2 hrs prior to CT scan and half 1 hr prior to scan 50 mL 0     Lancets MISC 1 each       OLANZapine (ZYPREXA) 10 MG tablet        atorvastatin (LIPITOR) 20 MG tablet 20 mg       enoxaparin (LOVENOX) 40 MG/0.4ML injection Inject 0.4 mLs (40 mg) Subcutaneous every 24 hours (Patient not taking: Reported on 7/25/2018) 24 Syringe 0     insulin aspart (NOVOLOG PEN) 100 UNIT/ML injection 4-15 Units       insulin glargine (LANTUS SOLOSTAR) 100 UNIT/ML pen Inject 20 Units Subcutaneous At Bedtime HOLD UNTIL YOU FOLLOW UP WITH YOUR PCP       Insulin Pen Needle (PEN NEEDLES 5/16\") 30G X 8 MM MISC Use as directed       LORazepam (ATIVAN) 0.5 MG tablet Take 1 tablet (0.5 mg) by mouth daily as needed for anxiety (Take one tablet now. Repeat in 20 minutes if needed) (Patient not taking: Reported on 6/20/2018) 2 tablet 0     losartan-hydrochlorothiazide (HYZAAR) 100-12.5 MG per tablet 1 tablet       metFORMIN (GLUCOPHAGE) 1000 MG tablet Take 1 tablet (1,000 mg) by mouth HOLD UNTIL YOU FOLLOW UP WITH YOUR PCP       ondansetron (ZOFRAN-ODT) 4 MG ODT tab DISSOLVE ONE TABLET BY MOUTH EVERY 4 HOURS AS NEEDED FOR NAUSEA       oxyCODONE IR (ROXICODONE) 5 MG tablet Take 1-2 tablets (5-10 mg) by mouth every 4 hours as needed for moderate to severe pain (Patient not taking: Reported on 7/25/2018) 30 tablet 0     polyethylene glycol (MIRALAX) powder Take 17 g (1 capful) by mouth daily as needed for constipation (Patient not taking: Reported on 7/25/2018) 510 g 0     prochlorperazine (COMPAZINE) 10 MG tablet 10 mg           Allergies:      No Known Allergies     Social History:     Social History   Substance Use Topics     Smoking " status: Never Smoker     Smokeless tobacco: Never Used     Alcohol use Not on file       History   Drug Use Not on file             Physical Exam:     /90  Pulse 97  Temp 97.6  F (36.4  C) (Oral)  Wt 77.3 kg (170 lb 6.4 oz)  SpO2 98%  BMI 30.43 kg/m2  Body mass index is 30.43 kg/(m^2).    General Appearance: healthy and alert, no distress     Neurological: normal gait, no gross defects     Psychiatric: appropriate mood and affect                               ABDOMEN:  Soft, nontender.  Well-healed incision.  No organomegaly.           Assessment:    Manda Santacruz is a 58 year old woman with a diagnosis of stage IV serous uterine cancer.     A total of 30 minutes was spent with the patient, 25 minutes of which were spent in counseling the patient and/or treatment planning.      1.  Stage IV high-grade serous uterine cancer.     2.  Prior small bowel resection with primary closure.   3.  Anemia.      Had a long discussion with the patient as well as with her family.  We will repeat hemoglobin today.  If it is below 8, consider additional blood transfusions.  We will also repeat a CT scan chest, abdomen and pelvis for disease status.  If she has increased disease, we would need to start chemotherapy sooner than later.  We will still plan for 2 more cycles of carbo-paclitaxel with carbo AUC of 6 and paclitaxel dose of 175 mg/m2.  If there is stable disease, we can wait up to like a month to give her a little more strength and time to recover.  If there is progression, we will want to start within the next 2 weeks or so.  We will see her back after the additional 2 cycles and CT chest, abdomen and pelvis.  The patient agrees with the plan.  She is very appreciative of her care.  All questions were answered.       Prabhjot Rm MD, MS    Department of Obstetrics and Gynecology   Division of Gynecologic Oncology   [facility]  Phone: [phone]      CC  Patient  Care Team:  Clinic, [facility] as PCP - General  SELF, REFERRED

## 2018-07-25 NOTE — NURSING NOTE
Removed IV from patient, she tolerated well.  Lisa Moseley CMA ([facility])

## 2018-08-01 ENCOUNTER — CARE COORDINATION (OUTPATIENT)
Dept: ONCOLOGY | Facility: CLINIC | Age: 58
End: 2018-08-01

## 2018-08-01 NOTE — PROGRESS NOTES
Care Coordinator Note  Clinic Note 7/25/18 faxed to [facility] Dr. Amberly Figueroa #[phone].    Lori Hoffman MSN, RN  Care Coordinator  Gynecologic Cancer   Office:  [phone]  Pager: [phone] #[phone]

## 2018-10-17 ENCOUNTER — RADIANT APPOINTMENT (OUTPATIENT)
Dept: CT IMAGING | Facility: CLINIC | Age: 58
End: 2018-10-17
Attending: OBSTETRICS & GYNECOLOGY
Payer: COMMERCIAL

## 2018-10-17 ENCOUNTER — ONCOLOGY VISIT (OUTPATIENT)
Dept: ONCOLOGY | Facility: CLINIC | Age: 58
End: 2018-10-17
Attending: OBSTETRICS & GYNECOLOGY
Payer: COMMERCIAL

## 2018-10-17 VITALS
TEMPERATURE: 97.8 F | BODY MASS INDEX: 32.78 KG/M2 | DIASTOLIC BLOOD PRESSURE: 94 MMHG | SYSTOLIC BLOOD PRESSURE: 150 MMHG | HEIGHT: 63 IN | RESPIRATION RATE: 16 BRPM | WEIGHT: 185 LBS | HEART RATE: 85 BPM | OXYGEN SATURATION: 99 %

## 2018-10-17 DIAGNOSIS — C54.1 ENDOMETRIAL CANCER (H): ICD-10-CM

## 2018-10-17 DIAGNOSIS — C54.1 ENDOMETRIAL CANCER (H): Primary | ICD-10-CM

## 2018-10-17 DIAGNOSIS — K63.1 PERFORATION OF INTESTINE (H): ICD-10-CM

## 2018-10-17 LAB
CREAT BLD-MCNC: 0.8 MG/DL (ref 0.52–1.04)
GFR SERPL CREATININE-BSD FRML MDRD: 74 ML/MIN/1.7M2

## 2018-10-17 PROCEDURE — G0463 HOSPITAL OUTPT CLINIC VISIT: HCPCS | Mod: ZF

## 2018-10-17 PROCEDURE — 99215 OFFICE O/P EST HI 40 MIN: CPT | Performed by: OBSTETRICS & GYNECOLOGY

## 2018-10-17 RX ORDER — CEFAZOLIN SODIUM 1 G/50ML
1 INJECTION, SOLUTION INTRAVENOUS SEE ADMIN INSTRUCTIONS
Status: CANCELLED | OUTPATIENT
Start: 2018-10-17

## 2018-10-17 RX ORDER — IOPAMIDOL 755 MG/ML
104 INJECTION, SOLUTION INTRAVASCULAR ONCE
Status: COMPLETED | OUTPATIENT
Start: 2018-10-17 | End: 2018-10-17

## 2018-10-17 RX ORDER — CEFAZOLIN SODIUM 2 G/50ML
2 SOLUTION INTRAVENOUS
Status: CANCELLED | OUTPATIENT
Start: 2018-10-17

## 2018-10-17 RX ADMIN — IOPAMIDOL 104 ML: 755 INJECTION, SOLUTION INTRAVASCULAR at 14:17

## 2018-10-17 ASSESSMENT — PAIN SCALES - GENERAL: PAINLEVEL: NO PAIN (0)

## 2018-10-17 NOTE — NURSING NOTE
"Oncology Rooming Note    October 17, 2018 5:17 PM   Manda Santacruz is a 58 year old female who presents for:    Chief Complaint   Patient presents with     Oncology Clinic Visit     Return Endometrial Ca     Initial Vitals: BP (!) 150/94  Pulse 85  Temp 97.8  F (36.6  C) (Oral)  Resp 16  Ht 1.594 m (5' 2.75\")  Wt 83.9 kg (185 lb)  SpO2 99%  BMI 33.03 kg/m2 Estimated body mass index is 33.03 kg/(m^2) as calculated from the following:    Height as of this encounter: 1.594 m (5' 2.75\").    Weight as of this encounter: 83.9 kg (185 lb). Body surface area is 1.93 meters squared.  No Pain (0) Comment: Data Unavailable   No LMP recorded. Patient has had a hysterectomy.  Allergies reviewed: Yes  Medications reviewed: Yes    Medications: Medication refills not needed today.  Pharmacy name entered into EPIC: Data Unavailable    Clinical concerns: CT scan results     6 minutes for nursing intake (face to face time)     Lisa Moseley CMA              "

## 2018-10-17 NOTE — LETTER
10/17/2018       RE: Manda Santacruz  [address] [place] [id number]     Dear Colleague,    Thank you for referring your patient, Manda Santcaruz, to the [facility]. Please see a copy of my visit note below.                Follow Up Notes on Referred Patient    Date: 10/22/2018       Dr. Neeru Segura MD  No address on file       RE: Manda Santacruz  : 1960  CULLEN: 10/17/2018    Dear Dr. Neeru Segura:    Manda Santacruz is a 58 year old woman with a diagnosis of stage IV serous uterine cancer.      Patient presents today for followup.  She has been doing better now.  She has received 2 additional cycles of chemotherapy, last one 2 weeks ago.  She is eating and drinking well.  No nausea, vomiting, fever or chills.  Normal urinary and bowel function.  No vaginal bleeding or discharge.  No B symptoms.             Health Maintenance Due   Topic Date Due     PHQ-2 Q1 YR  1973     TETANUS IMMUNIZATION (SYSTEM ASSIGNED)  1979     HIV SCREEN (SYSTEM ASSIGNED)  1979     HEPATITIS C SCREENING  1979     PAP SCREENING Q3 YR (SYSTEM ASSIGNED)  1982     LIPID SCREEN Q5 YR FEMALE (SYSTEM ASSIGNED)  2006     MAMMO SCREEN Q2 YR (SYSTEM ASSIGNED)  2011     COLON CANCER SCREEN (SYSTEM ASSIGNED)  2011     ADVANCE DIRECTIVE PLANNING Q5 YRS  2016     INFLUENZA VACCINE (1) 2018       Current Medications:     Current Outpatient Prescriptions   Medication Sig Dispense Refill     aspirin 81 MG chewable tablet 81 mg       clindamycin (CLEOCIN) 300 MG capsule        enoxaparin (LOVENOX) 40 MG/0.4ML injection Inject 0.4 mLs (40 mg) Subcutaneous every 24 hours 24 Syringe 0     HYDROcodone-acetaminophen (NORCO) 5-325 MG per tablet Uses this instead of Oxycodone       insulin glargine (LANTUS SOLOSTAR) 100 UNIT/ML pen Inject 20 Units Subcutaneous At Bedtime HOLD UNTIL YOU FOLLOW UP WITH YOUR PCP       acetaminophen (TYLENOL) 325 MG tablet  "Take 2 tablets (650 mg) by mouth every 4 hours as needed for mild pain (Patient not taking: Reported on 10/17/2018) 100 tablet 0     atorvastatin (LIPITOR) 20 MG tablet 20 mg       Blood Glucose Monitoring Suppl (BLOOD GLUCOSE MONITOR SYSTEM) W/DEVICE KIT 1 Kit       dexamethasone (DECADRON) 4 MG tablet        Glucose Blood (BLOOD GLUCOSE TEST STRIPS) STRP 1 strip       insulin aspart (NOVOLOG PEN) 100 UNIT/ML injection 4-15 Units       Insulin Pen Needle (PEN NEEDLES 5/16\") 30G X 8 MM MISC Use as directed       Lancets MISC 1 each       losartan-hydrochlorothiazide (HYZAAR) 100-12.5 MG per tablet 1 tablet       metFORMIN (GLUCOPHAGE) 1000 MG tablet Take 1 tablet (1,000 mg) by mouth HOLD UNTIL YOU FOLLOW UP WITH YOUR PCP       OLANZapine (ZYPREXA) 10 MG tablet        ondansetron (ZOFRAN-ODT) 4 MG ODT tab DISSOLVE ONE TABLET BY MOUTH EVERY 4 HOURS AS NEEDED FOR NAUSEA       prochlorperazine (COMPAZINE) 10 MG tablet 10 mg           Allergies:      No Known Allergies     Social History:     Social History   Substance Use Topics     Smoking status: Never Smoker     Smokeless tobacco: Never Used     Alcohol use Not on file       History   Drug Use Not on file         Physical Exam:     BP (!) 150/94  Pulse 85  Temp 97.8  F (36.6  C) (Oral)  Resp 16  Ht 1.594 m (5' 2.75\")  Wt 83.9 kg (185 lb)  SpO2 99%  BMI 33.03 kg/m2  Body mass index is 33.03 kg/(m^2).    General Appearance: healthy and alert, no distress     Musculoskeletal: extremities non tender and without edema    Skin: no lesions or rashes     Neurological: normal gait, no gross defects     Psychiatric: appropriate mood and affect                               ABDOMEN:  Soft, nontender, nondistended.   No organomegaly.       PELVIC:  Normal external genitalia.  Normal-appearing vaginal mucosa.  There is about a 7 cm mass protruding from the cervix.  It does not appear to invade the perimetrium.             Assessment:    Manda Santacruz is a 58 year old " woman with a diagnosis of stage IV serous uterine cancer.     A total of 40 minutes was spent with the patient, 30 minutes of which were spent in counseling the patient and/or treatment planning.      1.  Stage IV serous uterine cancer.     2.  Status post bowel perforation and small bowel resection.   2.  Status post adjuvant chemotherapy with a positive treatment response.        I reviewed with the patient as well as with her family the recent imaging.  She has had a good treatment response.  She still does have a mass protruding through the cervix.  The parametrium appears free based on exam.  I discussed that we will proceed in 3 weeks with an exploratory laparotomy, modified radical hysterectomy, bilateral salpingo-oophorectomy, possible periaortic lymphadenectomy and debulking followed by additional chemotherapy.  We also might have to do some bowel resection as well as a permanent or temporary stoma.  We will have her see our colleagues in Anesthesia for preoperative clearance and to the stoma nurse for stoma site marking for either a diverting loop ileostomy or end colostomy.  The patient as well as her family agree with the plan.  They are very appreciative of her care.  All questions were answered.       Risks, benefits and alternatives to proceed discussed in detail with the patient. Risks include but are not limited to bleeding, infection, possible injury to surrounding organs including bowel, bladder, ureter, need for second procedure/surgery related to complications from first procedure, postoperative medical complications such as cardiopulmonary events, lymphedema, lymphocyst, thromboembolic events. Consent for surgery, blood transfusion signed.  Will arrange appropriate preoperative blood work, CXR, EKG. Patient also advised on need for postoperative surveillance and/or adjuvant therapy. Questions answered.    Prabhjot Rm MD, MS    Department of Obstetrics and  Gynecology   Division of Gynecologic Oncology   [facility]  Phone: [phone]        CC  Patient Care Team:  Dillon, Devonte Quiroz as PCP - General

## 2018-10-17 NOTE — DISCHARGE INSTRUCTIONS

## 2018-10-17 NOTE — MR AVS SNAPSHOT
"              After Visit Summary   10/17/2018    Manda Santacruz    MRN: [id number]           Patient Information     Date Of Birth          1960        Visit Information        Provider Department      10/17/2018 5:20 PM Prabhjot Rm MD [facility]        Today's Diagnoses     Endometrial cancer (H)    -  1       Follow-ups after your visit        Additional Services     PAC Visit Referral (For [facility] Only)       H&P with PAC assessment:Exploratory Laparotomy Total Abdominal Hysterectomy, bilateral Salpingo-Oophorectomy, tumor debulking, omentectomy, possible Ostomy            WO Nursing Referral       Referral to [facility] Nursing. Please forward to [facility] POOL.    If \"All of the above\" is marked as the reason for the appointment, the reasons are: pre-surgical stoma site marking, pouch re-fitting, treatment of peristomal skin, peristomal hernia belt fitting, ileostomy lavage for possible food blockage, and sterile catheterization from urinary stoma.                  Your next 10 appointments already scheduled     Nov 13, 2018   Procedure with Prabhjot Rm MD   [facility], [place], Same Day Surgery (--)    [address] [facility] [id number] [phone]            Dec 05, 2018 10:40 AM CST   (Arrive by 10:25 AM)   Post-Op with Prabhjot Rm MD   [facility] ([facility] [place])    [address]  [address]  [facility] [id number] [phone]              Who to contact     If you have questions or need follow up information about today's clinic visit or your schedule please contact [facility] directly at [phone].  Normal or non-critical lab and imaging results will be communicated to you by MyChart, letter or phone within 4 business days after the clinic has received the results. If you do not hear from us within 7 days, please contact the clinic through MyChart or phone. If you have a critical or " "abnormal lab result, we will notify you by phone as soon as possible.  Submit refill requests through [employer] or call your pharmacy and they will forward the refill request to us. Please allow 3 business days for your refill to be completed.          Additional Information About Your Visit        MyChart Information     Adiana lets you send messages to your doctor, view your test results, renew your prescriptions, schedule appointments and more. To sign up, go to [email or web address] . Click on \"Log in\" on the left side of the screen, which will take you to the Welcome page. Then click on \"Sign up Now\" on the right side of the page.     You will be asked to enter the access code listed below, as well as some personal information. Please follow the directions to create your username and password.     Your access code is: [email or web address]  Expires: 2019  8:51 AM     Your access code will  in 90 days. If you need help or a new code, please call your [place] clinic or [phone].        Care EveryWhere ID     This is your Care EveryWhere ID. This could be used by other organizations to access your [place] medical records  [id number]        Your Vitals Were     Pulse Temperature Respirations Height Pulse Oximetry BMI (Body Mass Index)    85 97.8  F (36.6  C) (Oral) 16 1.594 m (5' 2.75\") 99% 33.03 kg/m2       Blood Pressure from Last 3 Encounters:   10/17/18 (!) 150/94   18 128/90   18 153/89    Weight from Last 3 Encounters:   10/17/18 83.9 kg (185 lb)   18 77.3 kg (170 lb 6.4 oz)   18 87.5 kg (192 lb 12.8 oz)              We Performed the Following     PAC Visit Referral (For [facility] Only)     [place]ana-Operative Worksheet - Exploratory Laparotomy Total Abdominal Hysterectomy, bilateral Salpingo-Oophorectomy, tumor debulking, omentectomy, possible intraperitoneal port placement     WOC Nursing Referral        Primary Care Provider Office Phone # Fax #    Devonte Kayji [facility] " [phone]       [address] [place] [address]        Equal Access to Services     KIMBERLEE RICHARDS : Hadii michael hitchcock noe Faulkner, waprosperda luqadaha, qaybta kaalmada nupur, moy maljameel power laHermelindapino buitrago. So [facility] [phone].    ATENCIÓN: Si habla español, tiene a roach disposición servicios gratuitos de asistencia lingüística. Farzanehame al [phone].    We comply with applicable federal civil rights laws and [place] laws. We do not discriminate on the basis of race, color, national origin, age, disability, sex, sexual orientation, or gender identity.            Thank you!     Thank you for choosing [facility]  for your care. Our goal is always to provide you with excellent care. Hearing back from our patients is one way we can continue to improve our services. Please take a few minutes to complete the written survey that you may receive in the mail after your visit with us. Thank you!             Your Updated Medication List - Protect others around you: Learn how to safely use, store and throw away your medicines at www.disposemymeds.org.          This list is accurate as of 10/17/18 11:59 PM.  Always use your most recent med list.                   Brand Name Dispense Instructions for use Diagnosis    acetaminophen 325 MG tablet    TYLENOL    100 tablet    Take 2 tablets (650 mg) by mouth every 4 hours as needed for mild pain    S/P exploratory laparotomy       aspirin 81 MG chewable tablet      81 mg        atorvastatin 20 MG tablet    LIPITOR     20 mg        Blood Glucose Monitor System w/Device Kit      1 Kit        BLOOD GLUCOSE TEST STRIPS Strp      1 strip        clindamycin 300 MG capsule    CLEOCIN          dexamethasone 4 MG tablet    DECADRON          enoxaparin 40 MG/0.4ML injection    LOVENOX    24 Syringe    Inject 0.4 mLs (40 mg) Subcutaneous every 24 hours    S/P exploratory laparotomy       HYDROcodone-acetaminophen 5-325 MG per tablet    NORCO  "    Uses this instead of Oxycodone        insulin aspart 100 UNIT/ML injection    NovoLOG PEN     4-15 Units        insulin glargine 100 UNIT/ML injection    LANTUS     Inject 20 Units Subcutaneous At Bedtime HOLD UNTIL YOU FOLLOW UP WITH YOUR PCP    Type 2 diabetes mellitus with complication, unspecified long term insulin use status       Lancets Misc      1 each        losartan-hydrochlorothiazide 100-12.5 MG per tablet    HYZAAR     1 tablet        metFORMIN 1000 MG tablet    GLUCOPHAGE     Take 1 tablet (1,000 mg) by mouth HOLD UNTIL YOU FOLLOW UP WITH YOUR PCP    Type 2 diabetes mellitus with complication, unspecified long term insulin use status       OLANZapine 10 MG tablet    zyPREXA          ondansetron 4 MG ODT tab    ZOFRAN-ODT     DISSOLVE ONE TABLET BY MOUTH EVERY 4 HOURS AS NEEDED FOR NAUSEA        Pen Needles 5/16\" 30G X 8 MM Misc      Use as directed        prochlorperazine 10 MG tablet    COMPAZINE     10 mg          "

## 2018-10-22 NOTE — PROGRESS NOTES
Follow Up Notes on Referred Patient    Date: 10/22/2018       Dr. Neeru Segura MD  No address on file       RE: Manda Satnacruz  : 1960  CULLEN: 10/17/2018    Dear Dr. Neeru Segura:    Manda Santacruz is a 58 year old woman with a diagnosis of stage IV serous uterine cancer.      Patient presents today for followup.  She has been doing better now.  She has received 2 additional cycles of chemotherapy, last one 2 weeks ago.  She is eating and drinking well.  No nausea, vomiting, fever or chills.  Normal urinary and bowel function.  No vaginal bleeding or discharge.  No B symptoms.             Health Maintenance Due   Topic Date Due     PHQ-2 Q1 YR  1973     TETANUS IMMUNIZATION (SYSTEM ASSIGNED)  1979     HIV SCREEN (SYSTEM ASSIGNED)  1979     HEPATITIS C SCREENING  1979     PAP SCREENING Q3 YR (SYSTEM ASSIGNED)  1982     LIPID SCREEN Q5 YR FEMALE (SYSTEM ASSIGNED)  2006     MAMMO SCREEN Q2 YR (SYSTEM ASSIGNED)  2011     COLON CANCER SCREEN (SYSTEM ASSIGNED)  2011     ADVANCE DIRECTIVE PLANNING Q5 YRS  2016     INFLUENZA VACCINE (1) 2018       Current Medications:     Current Outpatient Prescriptions   Medication Sig Dispense Refill     aspirin 81 MG chewable tablet 81 mg       clindamycin (CLEOCIN) 300 MG capsule        enoxaparin (LOVENOX) 40 MG/0.4ML injection Inject 0.4 mLs (40 mg) Subcutaneous every 24 hours 24 Syringe 0     HYDROcodone-acetaminophen (NORCO) 5-325 MG per tablet Uses this instead of Oxycodone       insulin glargine (LANTUS SOLOSTAR) 100 UNIT/ML pen Inject 20 Units Subcutaneous At Bedtime HOLD UNTIL YOU FOLLOW UP WITH YOUR PCP       acetaminophen (TYLENOL) 325 MG tablet Take 2 tablets (650 mg) by mouth every 4 hours as needed for mild pain (Patient not taking: Reported on 10/17/2018) 100 tablet 0     atorvastatin (LIPITOR) 20 MG tablet 20 mg       Blood Glucose Monitoring Suppl (BLOOD GLUCOSE MONITOR SYSTEM)  "W/DEVICE KIT 1 Kit       dexamethasone (DECADRON) 4 MG tablet        Glucose Blood (BLOOD GLUCOSE TEST STRIPS) STRP 1 strip       insulin aspart (NOVOLOG PEN) 100 UNIT/ML injection 4-15 Units       Insulin Pen Needle (PEN NEEDLES 5/16\") 30G X 8 MM MISC Use as directed       Lancets MISC 1 each       losartan-hydrochlorothiazide (HYZAAR) 100-12.5 MG per tablet 1 tablet       metFORMIN (GLUCOPHAGE) 1000 MG tablet Take 1 tablet (1,000 mg) by mouth HOLD UNTIL YOU FOLLOW UP WITH YOUR PCP       OLANZapine (ZYPREXA) 10 MG tablet        ondansetron (ZOFRAN-ODT) 4 MG ODT tab DISSOLVE ONE TABLET BY MOUTH EVERY 4 HOURS AS NEEDED FOR NAUSEA       prochlorperazine (COMPAZINE) 10 MG tablet 10 mg           Allergies:      No Known Allergies     Social History:     Social History   Substance Use Topics     Smoking status: Never Smoker     Smokeless tobacco: Never Used     Alcohol use Not on file       History   Drug Use Not on file         Physical Exam:     BP (!) 150/94  Pulse 85  Temp 97.8  F (36.6  C) (Oral)  Resp 16  Ht 1.594 m (5' 2.75\")  Wt 83.9 kg (185 lb)  SpO2 99%  BMI 33.03 kg/m2  Body mass index is 33.03 kg/(m^2).    General Appearance: healthy and alert, no distress     Musculoskeletal: extremities non tender and without edema    Skin: no lesions or rashes     Neurological: normal gait, no gross defects     Psychiatric: appropriate mood and affect                               ABDOMEN:  Soft, nontender, nondistended.   No organomegaly.       PELVIC:  Normal external genitalia.  Normal-appearing vaginal mucosa.  There is about a 7 cm mass protruding from the cervix.  It does not appear to invade the perimetrium.             Assessment:    Manda Santacruz is a 58 year old woman with a diagnosis of stage IV serous uterine cancer.     A total of 40 minutes was spent with the patient, 30 minutes of which were spent in counseling the patient and/or treatment planning.      1.  Stage IV serous uterine cancer.     2. "  Status post bowel perforation and small bowel resection.   2.  Status post adjuvant chemotherapy with a positive treatment response.        I reviewed with the patient as well as with her family the recent imaging.  She has had a good treatment response.  She still does have a mass protruding through the cervix.  The parametrium appears free based on exam.  I discussed that we will proceed in 3 weeks with an exploratory laparotomy, modified radical hysterectomy, bilateral salpingo-oophorectomy, possible periaortic lymphadenectomy and debulking followed by additional chemotherapy.  We also might have to do some bowel resection as well as a permanent or temporary stoma.  We will have her see our colleagues in Anesthesia for preoperative clearance and to the stoma nurse for stoma site marking for either a diverting loop ileostomy or end colostomy.  The patient as well as her family agree with the plan.  They are very appreciative of her care.  All questions were answered.       Risks, benefits and alternatives to proceed discussed in detail with the patient. Risks include but are not limited to bleeding, infection, possible injury to surrounding organs including bowel, bladder, ureter, need for second procedure/surgery related to complications from first procedure, postoperative medical complications such as cardiopulmonary events, lymphedema, lymphocyst, thromboembolic events. Consent for surgery, blood transfusion signed.  Will arrange appropriate preoperative blood work, CXR, EKG. Patient also advised on need for postoperative surveillance and/or adjuvant therapy. Questions answered.    Prabhjot Rm MD, MS    Department of Obstetrics and Gynecology   Division of Gynecologic Oncology   [facility]  Phone: [phone]        CC  Patient Care Team:  Dillon, Whitney Carolina Quiroz as PCP - General  SELF, REFERRED

## 2018-11-08 RX ORDER — SENNOSIDES A AND B 8.6 MG/1
1 TABLET, FILM COATED ORAL EVERY MORNING
COMMUNITY
End: 2019-08-28

## 2018-11-08 RX ORDER — CEFDINIR 300 MG/1
300 CAPSULE ORAL 2 TIMES DAILY
COMMUNITY
End: 2019-08-28

## 2018-11-09 ENCOUNTER — OFFICE VISIT (OUTPATIENT)
Dept: SURGERY | Facility: CLINIC | Age: 58
End: 2018-11-09
Payer: COMMERCIAL

## 2018-11-09 ENCOUNTER — OFFICE VISIT (OUTPATIENT)
Dept: WOUND CARE | Facility: CLINIC | Age: 58
End: 2018-11-09
Payer: COMMERCIAL

## 2018-11-09 ENCOUNTER — ANESTHESIA EVENT (OUTPATIENT)
Dept: SURGERY | Facility: CLINIC | Age: 58
DRG: 741 | End: 2018-11-09
Payer: COMMERCIAL

## 2018-11-09 VITALS
SYSTOLIC BLOOD PRESSURE: 145 MMHG | OXYGEN SATURATION: 100 % | TEMPERATURE: 98 F | DIASTOLIC BLOOD PRESSURE: 87 MMHG | WEIGHT: 188.1 LBS | HEIGHT: 63 IN | RESPIRATION RATE: 16 BRPM | HEART RATE: 70 BPM | BODY MASS INDEX: 33.33 KG/M2

## 2018-11-09 DIAGNOSIS — Z01.818 PREOP GENERAL PHYSICAL EXAM: ICD-10-CM

## 2018-11-09 DIAGNOSIS — K63.1 BOWEL PERFORATION (H): Primary | ICD-10-CM

## 2018-11-09 DIAGNOSIS — Z01.818 PREOP GENERAL PHYSICAL EXAM: Primary | ICD-10-CM

## 2018-11-09 DIAGNOSIS — Z01.818 PREOP EXAMINATION: Primary | ICD-10-CM

## 2018-11-09 LAB
ABO + RH BLD: NORMAL
ABO + RH BLD: NORMAL
ANION GAP SERPL CALCULATED.3IONS-SCNC: 6 MMOL/L (ref 3–14)
BLD GP AB SCN SERPL QL: NORMAL
BLOOD BANK CMNT PATIENT-IMP: NORMAL
BUN SERPL-MCNC: 13 MG/DL (ref 7–30)
CALCIUM SERPL-MCNC: 9.3 MG/DL (ref 8.5–10.1)
CHLORIDE SERPL-SCNC: 104 MMOL/L (ref 94–109)
CO2 SERPL-SCNC: 26 MMOL/L (ref 20–32)
CREAT SERPL-MCNC: 0.84 MG/DL (ref 0.52–1.04)
ERYTHROCYTE [DISTWIDTH] IN BLOOD BY AUTOMATED COUNT: 16.1 % (ref 10–15)
GFR SERPL CREATININE-BSD FRML MDRD: 70 ML/MIN/1.7M2
GLUCOSE SERPL-MCNC: 102 MG/DL (ref 70–99)
HBA1C MFR BLD: 5.9 % (ref 0–5.6)
HCT VFR BLD AUTO: 37.7 % (ref 35–47)
HGB BLD-MCNC: 11.7 G/DL (ref 11.7–15.7)
INR PPP: 1.07 (ref 0.86–1.14)
MCH RBC QN AUTO: 29.5 PG (ref 26.5–33)
MCHC RBC AUTO-ENTMCNC: 31 G/DL (ref 31.5–36.5)
MCV RBC AUTO: 95 FL (ref 78–100)
PLATELET # BLD AUTO: 224 10E9/L (ref 150–450)
POTASSIUM SERPL-SCNC: 3.6 MMOL/L (ref 3.4–5.3)
RBC # BLD AUTO: 3.97 10E12/L (ref 3.8–5.2)
SODIUM SERPL-SCNC: 137 MMOL/L (ref 133–144)
SPECIMEN EXP DATE BLD: NORMAL
WBC # BLD AUTO: 4.6 10E9/L (ref 4–11)

## 2018-11-09 RX ORDER — LOSARTAN POTASSIUM AND HYDROCHLOROTHIAZIDE 12.5; 1 MG/1; MG/1
1 TABLET ORAL PRN
COMMUNITY
Start: 2018-03-01 | End: 2021-01-20 | Stop reason: ALTCHOICE

## 2018-11-09 RX ORDER — HYDROCODONE BITARTRATE AND ACETAMINOPHEN 5; 325 MG/1; MG/1
1 TABLET ORAL PRN
Status: ON HOLD | COMMUNITY
Start: 2018-10-18 | End: 2018-11-16

## 2018-11-09 NOTE — MR AVS SNAPSHOT
After Visit Summary   2018    Manda Santacruz    MRN: [id number]           Patient Information     Date Of Birth          1960        Visit Information        Provider Department      2018 12:30 PM Ronald Maddox RN [facility] Wound Ostomy        Today's Diagnoses     Bowel perforation (H)    -  1       Follow-ups after your visit        Your next 10 appointments already scheduled     2018   Procedure with Prabhjot Rm MD   [facility], [place], Same Day Surgery (--)    [address] [id number] [phone]            Dec 05, 2018 10:40 AM CST   (Arrive by 10:25 AM)   Post-Op with MD DANIELLE Pat [facility] ([facility] [place])    [address] [place] [address]  [address]  [facility] [address] [phone]              Who to contact     Please call your clinic at [phone] to:    Ask questions about your health    Make or cancel appointments    Discuss your medicines    Learn about your test results    Speak to your doctor            Additional Information About Your Visit        [employer] Information     woohoo mobile marketing is an electronic gateway that provides easy, online access to your medical records. With woohoo mobile marketing, you can request a clinic appointment, read your test results, renew a prescription or communicate with your care team.     To sign up for [employer]oo mobile marketing visit the website at [email or web address]   You will be asked to enter the access code listed below, as well as some personal information. Please follow the directions to create your username and password.     Your access code is: [email or web address]  Expires: 2019  7:51 AM     Your access code will  in 90 days. If you need help or a new code, please contact your [facility] or call [phone] for assistance.        Care EveryWhere ID     This is your Care EveryWhere ID. This could be used by other organizations to access your  [place] medical records  [id number]         Blood Pressure from Last 3 Encounters:   11/09/18 145/87   10/17/18 (!) 150/94   07/25/18 128/90    Weight from Last 3 Encounters:   11/09/18 85.3 kg (188 lb 1.6 oz)   10/17/18 83.9 kg (185 lb)   07/25/18 77.3 kg (170 lb 6.4 oz)              Today, you had the following     No orders found for display       Primary Care Provider Office Phone # Fax #    Devonte [facility]ero[facility] [phone]       [address] [address]        Equal Access to Services     KIMBERLEE RICHARDS : Hadii michael malhotrao Lucie, waaxda luqadaha, qaybta kaalmada aderafaelyada, moy buitrago. So [facility] [phone].    ATENCIÓN: Si habla español, tiene a roach disposición servicios gratuitos de asistencia lingüística. [facility] [phone].    We comply with applicable federal civil rights laws and [place] laws. We do not discriminate on the basis of race, color, national origin, age, disability, sex, sexual orientation, or gender identity.            Thank you!     Thank you for choosing [facility] W[facility]ST[facility]  for your care. Our goal is always to provide you with excellent care. Hearing back from our patients is one way we can continue to improve our services. Please take a few minutes to complete the written survey that you may receive in the mail after your visit with us. Thank you!             Your Updated Medication List - Protect others around you: Learn how to safely use, store and throw away your medicines at www.disposemymeds.org.          This list is accurate as of 11/9/18  6:08 PM.  Always use your most recent med list.                   Brand Name Dispense Instructions for use Diagnosis    aspirin 81 MG chewable tablet      Take 81 mg by mouth daily (with lunch) ON HOLD FOR SURGERY SINCE 11/07/2018        cefdinir 300 MG capsule    OMNICEF     Take 300 mg by mouth 2 times daily        HYDROcodone-acetaminophen 5-325 MG per tablet     NORCO     Take 1 tablet by mouth as needed        losartan-hydrochlorothiazide 100-12.5 MG per tablet    HYZAAR     Take 1 tablet by mouth as needed ONLY TAKES WHEN HER BLOOD PRESSURE IS HIGH        ondansetron 4 MG ODT tab    ZOFRAN-ODT     DISSOLVE ONE TABLET BY MOUTH EVERY 4 HOURS AS NEEDED FOR NAUSEA        senna 8.6 MG tablet    SENOKOT     Take 1 tablet by mouth every morning

## 2018-11-09 NOTE — ANESTHESIA PREPROCEDURE EVALUATION
Anesthesia Pre-Procedure Evaluation    Patient: Manda Santacruz   MRN:     [id number] Gender:   female   Age:    58 year old :      1960        Preoperative Diagnosis: Endometrial Cancer    Procedure(s):  Exploratory Laparotomy, Total Abdominal Radical Hysterectomy, Bilateral Salpingo-Oophorectomy, Tumor Debulking, Omentectomy, Possible Lymph Node Dissection, Possible Bowel Resection And Stoma     No past medical history on file.   No past surgical history on file.       Anesthesia Evaluation     . Pt has had prior anesthetic. Type: General    No history of anesthetic complications          ROS/MED HX    ENT/Pulmonary:  - neg pulmonary ROS     Neurologic:  - neg neurologic ROS     Cardiovascular:     (+) hypertension----. : . . . :. . Previous cardiac testing date:results:date: results:ECG reviewed date:2018 results:IMPRESSION  SINUS TACHYCARDIA  LEFT AXIS DEVIATION (QRS AXIS < -30)  PATTERN CONSISTENT WITH PULMONARY DISEASE  SEPTAL MYOCARDIAL INFARCTION , PROBABLY OLD (40+ ms Q WAVE IN V1/V2)  ABNORMAL ECG date: results:          METS/Exercise Tolerance:  >4 METS   Hematologic:     (+) Anemia, History of Transfusion no previous transfusion reaction -      Musculoskeletal:  - neg musculoskeletal ROS       GI/Hepatic:  - neg GI/hepatic ROS       Renal/Genitourinary:     (+) chronic renal disease, Nephrolithiasis , Other Renal/ Genitourinary, Endometrial Cancer      Endo:     (+) type II DM Last HgA1c: 5.9 date: 2018 Not using insulin - not using insulin pump .      Psychiatric:  - neg psychiatric ROS       Infectious Disease:  - neg infectious disease ROS       Malignancy:   (+) Malignancy History of Other  Other CA Endometrial Cancer Active status post Chemo         Other:    (+) C-spine cleared: N/A, no H/O Chronic Pain,no other significant disability                        PHYSICAL EXAM:   Mental Status/Neuro: A/A/O   Airway: Facies: Feasible  Mallampati: I  Mouth/Opening: Full  TM distance: <  "6 cm  Neck ROM: Full   Respiratory: Auscultation: CTAB     Resp. Rate: Normal     Resp. Effort: Normal      CV: Rhythm: Regular  Rate: Age appropriate  Heart: Normal Sounds   Comments:      Dental: Normal                  Lab Results   Component Value Date    WBC 4.5 07/25/2018    HGB 9.8 (L) 07/25/2018    HCT 30.6 (L) 07/25/2018     (H) 07/25/2018     07/01/2018    POTASSIUM 3.6 07/02/2018    CHLORIDE 102 07/01/2018    CO2 24 07/01/2018    BUN 7 07/01/2018    CR 0.45 (L) 07/01/2018    GLC 97 07/01/2018    BIMAL 8.7 07/01/2018    PHOS 2.9 07/01/2018    MAG 1.6 07/01/2018       Preop Vitals  BP Readings from Last 3 Encounters:   11/09/18 145/87   10/17/18 (!) 150/94   07/25/18 128/90    Pulse Readings from Last 3 Encounters:   11/09/18 70   10/17/18 85   07/25/18 97      Resp Readings from Last 3 Encounters:   11/09/18 16   10/17/18 16   07/02/18 16    SpO2 Readings from Last 3 Encounters:   11/09/18 100%   10/17/18 99%   07/25/18 98%      Temp Readings from Last 1 Encounters:   11/09/18 98  F (36.7  C) (Oral)    Ht Readings from Last 1 Encounters:   11/09/18 1.594 m (5' 2.75\")      Wt Readings from Last 1 Encounters:   11/09/18 85.3 kg (188 lb 1.6 oz)    Estimated body mass index is 33.59 kg/(m^2) as calculated from the following:    Height as of this encounter: 1.594 m (5' 2.75\").    Weight as of this encounter: 85.3 kg (188 lb 1.6 oz).     LDA:  Peripheral IV 07/25/18 Right Upper forearm (Active)   Number of days:107            Assessment:   ASA SCORE: 3    NPO Status: > 6 hours since completed Solid Foods   Documentation: H&P complete; Preop Testing complete; Consents complete   Proceeding: Proceed without further delay  Tobacco Use:  NO Active use of Tobacco/UNKNOWN Tobacco use status     Plan:   Anes. Type:  General   Pre-Induction: Midazolam IV; Acetaminophen PO   Induction:  IV (Standard)   Airway: Oral ETT   Access/Monitoring: PIV   Maintenance: Balanced   Emergence: Procedure Site   Logistics: " Same Day Surgery     Postop Pain/Sedation Strategy:  Standard-Options: Opioids PRN     PONV Management:  Adult Risk Factors: Female, Non-Smoker, Postop Opioids  Prevention: Ondansetron     CONSENT: Direct conversation   Plan and risks discussed with: Patient   Blood Products: Consented (ALL Blood Products)                  PAC Discussion and Assessment    ASA Classification: 3  Case is suitable for: [place]  Anesthetic techniques and relevant risks discussed: GA with regional block for post-op pain control  Invasive monitoring and risk discussed:   Types:   Possibility and Risk of blood transfusion discussed: Yes  NPO instructions given: NPO after midnight  Additional anesthetic preparation and risks discussed: Invasive monitoring  Needs early admission to pre-op area:   Other:     PAC Resident/NP Anesthesia Assessment:  Manda Santacruz is a 57 yo female scheduled for Exploratory Laparotomy, Total Abdominal Radical Hysterectomy, Bilateral Salpingo-Oophorectomy, Tumor Debulking, Omentectomy, Possible Lymph Node Dissection, Possible Bowel Resection And Stoma on 11/13/2018 by Dr. Rm in treatment of stage IV uterine cancer     Previous anesthesia without complications    1) Cardiac: HTN, no longer requiring treatment and has been stable. METS over 4  2) Pulmonary: Never smoked, denies pulmonary symptoms   3) GI: small bowel perforation, [place] [facility]  4) Endo: DM II, no longer requiring meds. BMI 33.5. A1C 5.5 on 7/2018 and 5.9 today             Reviewed and Signed by PAC Mid-Level Provider/Resident  Mid-Level Provider/Resident: JOANA Olsen  Date: 11/9/2018  Time: 1230    Attending Anesthesiologist Anesthesia Assessment:  Manda Santacruz is 58 y.o female with PMH significant for well-controlled HTN and DM type 2, endometrial cancer presents for preop evaluation for exploratory Laparotomy, Total Abdominal Radical Hysterectomy, Bilateral Salpingo-Oophorectomy. Pt denies SOB, chest pain,  recent fever, chills, tingling, numbness, h/o MI, DM, stroke or seizure. Recent labs wnl, EKG didn't show any acute ischemic changes. Pt is medically optimized for surgery and no further workup is recommended  Cardiac risk: ~0.9%, >4METs  WILDER: intermediate risk  PONV: 3pt (if 2 or > use of antiemetic proph is recommended)  VTE risk: ~1.8%  Airway: feasible on exam  Elise Nix MD  Anesthesia Resident - CA1  11/9/2018  11:38 AM      Reviewed and Signed by PAC Anesthesiologist  Anesthesiologist: REMBERTO  Date: 11/1/3/18  Time:   Pass/Fail:   Disposition:     PAC Pharmacist Assessment:        Pharmacist:   Date:   Time:        Elise Nix MD     Anesthesia attending addendum    Prior to anesthetic I have examined the patient, reviewed the medical history, and discussed the ssessment and plan with the anesthesia and surgery teams.  58 year old female who  has a past medical history of Perforated bowel (H) and Uterine cancer (H). to OR for Procedure(s):  Exploratory Laparotomy, Total Abdominal Radical Hysterectomy, Bilateral Salpingo-Oophorectomy, Tumor Debulking, Omentectomy, Possible Lymph Node Dissection, Possible Bowel Resection And Stoma     Patient denies any history of cardiac or pulmonary disease or symptoms, and reports good functional capacity.  NPO status adequate.  HCG Quantitative Serum   Date Value Ref Range Status   11/13/2018 3 0 - 5 IU/L Final     Hemoglobin   Date Value Ref Range Status   11/09/2018 11.7 11.7 - 15.7 g/dL Final     Potassium   Date Value Ref Range Status   11/13/2018 3.8 3.4 - 5.3 mmol/L Final     Comment:     Specimen slightly hemolyzed, potassium may be falsely elevated     Plan for GA, standard monitors, large bore IVs, possible arterial line, possible blood transfusion, PONV prophylaxis.  Antibiotics per primary service.  Anesthetic risks discussed with the patient, consent in chart.    Susu Lombardi MD  11/13/18

## 2018-11-09 NOTE — MR AVS SNAPSHOT
After Visit Summary   2018    Manda Santacruz    MRN: [id number]           Patient Information     Date Of Birth          1960        Visit Information        Provider Department      2018 11:00 AM Marley Lozada APRN CNS M [facility]        Today's Diagnoses     Preop general physical exam    -  1      Care Instructions    Preparing for Your Surgery      Name:  Manda Santacruz   MRN:  [id number]   :  1960   Today's Date:  2018     Arriving for surgery:  Surgery date:  18  Arrival time:  5:30AM  Please come to:       [facility] [address]  [address] [place]  [address]    -   parking is available in front of the hospital from 5:15 am to 8:00 pm    -  Stop at the Information Desk in the lobby    -   Inform the information person that you are here for surgery. An escort to 3c will be provided. If you would not like an escort, please proceed to 3C on the 3rd floor. [phone]     What can I eat or drink?  -  You may have solid food or milk products until 8 hours prior to your surgery. Midnight  -  You may have water, apple juice or 7up/Sprite until 2 1/2 hours prior to your surgery. 5:30AM    Which medicines can I take?  Stop Aspirin, vitamins and supplements one week prior to surgery.  Hold Ibuprofen and Naproxen for 24 hours prior to surgery.   -  Do NOT take these medications in the morning, the day of surgery:    Losartan-Hydrochlorothiazide  Senna    -  Please take these medications the day of surgery:    Cefdinir   -As needed:   Hydrocodone  How do I prepare myself?  -  Take two showers: one the night before surgery; and one the morning of surgery.         Use Scrubcare or Hibiclens to wash from neck down.  You may use your own shampoo and conditioner. No other hair products.   -  Do NOT use lotion, powder, deodorant, or antiperspirant the day of your surgery.  -  Do NOT  wear any makeup, fingernail polish or jewelry.  -  Begin using Incentive Spirometer 1 week prior to surgery.  Use 4 times per day, up to 5 breaths each time.  Bring Incentive Spirometer to hospital.  - Do not bring your own medications to the hospital, except for inhalers and eye drops.  -  Bring your ID and insurance card.    Questions or Concerns:  -If you have questions or concerns regarding the day of surgery, please call [phone].     -For questions after surgery please call your surgeons office.           AFTER YOUR SURGERY  Breathing exercises   Breathing exercises help you recover faster. Take deep breaths and let the air out slowly. This will:     Help you wake up after surgery.    Help prevent complications like pneumonia.  Preventing complications will help you go home sooner.   We may give you a breathing device (incentive spirometer) to encourage you to breathe deeply.   Nausea and vomiting   You may feel sick to your stomach after surgery; if so, let your nurse know.    Pain control:  After surgery, you may have pain. Our goal is to help you manage your pain. Pain medicine will help you feel comfortable enough to do activities that will help you heal.  These activities may include breathing exercises, walking and physical therapy.   To help your health care team treat your pain we will ask: 1) If you have pain  2) where it is located 3) describe your pain in your words  Methods of pain control include medications given by mouth, vein or by nerve block for some surgeries.  Sequential Compression Device (SCD) or Pneumo Boots:  You may need to wear SCD S on your legs or feet. These are wraps connected to a machine that pumps in air and releases it. The repeated pumping helps prevent blood clots from forming.     Using an Incentive Spirometer    An incentive spirometer is a device that helps you do deep breathing exercises. These exercises expand your lungs, aid in circulation, and help prevent  pneumonia. Deep breathing exercises also help you breathe better and improve the function of your lungs by:    Keeping your lungs clear    Strengthening your breathing muscles    Helping prevent respiratory complications or problems  The incentive spirometer gives you a way to take an active part in your care. A nurse or therapist will teach you breathing exercises. To do these exercises, you will breathe in through your mouth and not your nose. The incentive spirometer only works correctly if you breathe in through your mouth.    Steps to clear lungs  Step 1. Exhale normally. Then, inhale normally.    Relax and breathe out.  Step 2. Place your lips tightly around the mouthpiece.    Make sure the device is upright and not tilted.  Step 3. Inhale as much air as you can through the mouthpiece (don't breath through your nose).    Inhale slowly and deeply.    Hold your breath long enough to keep the balls or disk raised for at least 3 to 5 seconds, or as instructed by your healthcare provider.  Step 4. Repeat the exercise regularly.  Begin using the Incentive Spirometer one week prior to your surgery, 4 times per day-5 times each.          Follow-ups after your visit        Your next 10 appointments already scheduled     Nov 09, 2018 12:30 PM CST   NEW OSTOMY NURSE VISIT with Ronald Maddox RN   [facility] ([facility])    [address]  4th Floor  [facility] [id number] [phone]            Nov 13, 2018   Procedure with Prabhjot Rm MD   [facility] [place], Same Day Surgery (--)    [address] [address]   [phone]            Dec 05, 2018 10:40 AM CST   (Arrive by 10:25 AM)   Post-Op with Prabhjot Rm MD   [facility] ([facility])    [address]  [address]  [facility] [id number] [phone]              Future tests that were ordered for you today     Open Future Orders         "Priority Expected Expires Ordered    ABO/Rh type and screen Routine 2018    Basic metabolic panel Routine 2018    CBC with platelets Routine 2018    INR Routine 2018    Hemoglobin A1c Routine 2018            Who to contact     Please call your clinic at [phone] to:    Ask questions about your health    Make or cancel appointments    Discuss your medicines    Learn about your test results    Speak to your doctor            Additional Information About Your Visit        [employer] Information     [employer] is an electronic gateway that provides easy, online access to your medical records. With [employer], you can request a clinic appointment, read your test results, renew a prescription or communicate with your care team.     To sign up for [employer] visit the website at [email or web address]   You will be asked to enter the access code listed below, as well as some personal information. Please follow the directions to create your username and password.     Your access code is: [email or web address]  Expires: 2019  7:51 AM     Your access code will  in 90 days. If you need help or a new code, please contact your [facility] or call [phone] for assistance.        Care EveryWhere ID     This is your Care EveryWhere ID. This could be used by other organizations to access your [facility] medical records  [id number]        Your Vitals Were     Pulse Temperature Respirations Height Pulse Oximetry BMI (Body Mass Index)    70 98  F (36.7  C) (Oral) 16 1.594 m (5' 2.75\") 100% 33.59 kg/m2       Blood Pressure from Last 3 Encounters:   18 145/87   10/17/18 (!) 150/94   18 128/90    Weight from Last 3 Encounters:   18 85.3 kg (188 lb 1.6 oz)   10/17/18 83.9 kg (185 lb)   18 77.3 kg (170 lb 6.4 oz)              Information about OPIOIDS     " PRESCRIPTION OPIOIDS: WHAT YOU NEED TO KNOW   We gave you an opioid (narcotic) pain medicine. It is important to manage your pain, but opioids are not always the best choice. You should first try all the other options your care team gave you. Take this medicine for as short a time (and as few doses) as possible.    Some activities can increase your pain, such as bandage changes or therapy sessions. It may help to take your pain medicine 30 to 60 minutes before these activities. Reduce your stress by getting enough sleep, working on hobbies you enjoy and practicing relaxation or meditation. Talk to your care team about ways to manage your pain beyond prescription opioids.    These medicines have risks:    DO NOT drive when on new or higher doses of pain medicine. These medicines can affect your alertness and reaction times, and you could be arrested for driving under the influence (DUI). If you need to use opioids long-term, talk to your care team about driving.    DO NOT operate heavy machinery    DO NOT do any other dangerous activities while taking these medicines.    DO NOT drink any alcohol while taking these medicines.     If the opioid prescribed includes acetaminophen, DO NOT take with any other medicines that contain acetaminophen. Read all labels carefully. Look for the word  acetaminophen  or  Tylenol.  Ask your pharmacist if you have questions or are unsure.    You can get addicted to pain medicines, especially if you have a history of addiction (chemical, alcohol or substance dependence). Talk to your care team about ways to reduce this risk.    All opioids tend to cause constipation. Drink plenty of water and eat foods that have a lot of fiber, such as fruits, vegetables, prune juice, apple juice and high-fiber cereal. Take a laxative (Miralax, milk of magnesia, Colace, Senna) if you don t move your bowels at least every other day. Other side effects include upset stomach, sleepiness, dizziness,  throwing up, tolerance (needing more of the medicine to have the same effect), physical dependence and slowed breathing.    Store your pills in a secure place, locked if possible. We will not replace any lost or stolen medicine. If you don t finish your medicine, please throw away (dispose) as directed by your pharmacist. The [place] Pollution Control Agency has more information about safe disposal: [email or web address]/[email or web address]-[email or web address]-medications         Primary Care Provider Office Phone # Fax #    Devonte Figueroa [facility] [phone]       [address] [address]        Equal Access to Services     KIMBERLEE RICHARDS : Montse Faulkner, marilyn sosa, josefina bonillaalmamateo espitia, moy aquino . So [facility] [phone].    ATENCIÓN: Si habla español, tiene a roach disposición servicios gratuitos de asistencia lingüística. [facility] [phone].    We comply with applicable federal civil rights laws and [place] laws. We do not discriminate on the basis of race, color, national origin, age, disability, sex, sexual orientation, or gender identity.            Thank you!     Thank you for choosing [facility]  for your care. Our goal is always to provide you with excellent care. Hearing back from our patients is one way we can continue to improve our services. Please take a few minutes to complete the written survey that you may receive in the mail after your visit with us. Thank you!             Your Updated Medication List - Protect others around you: Learn how to safely use, store and throw away your medicines at www.disposemymeds.org.          This list is accurate as of 11/9/18 11:32 AM.  Always use your most recent med list.                   Brand Name Dispense Instructions for use Diagnosis    aspirin 81 MG chewable tablet      Take 81 mg by mouth daily (with lunch) ON HOLD FOR SURGERY SINCE  11/07/2018        cefdinir 300 MG capsule    OMNICEF     Take 300 mg by mouth 2 times daily        HYDROcodone-acetaminophen 5-325 MG per tablet    NORCO     Take 1 tablet by mouth as needed        losartan-hydrochlorothiazide 100-12.5 MG per tablet    HYZAAR     Take 1 tablet by mouth as needed ONLY TAKES WHEN HER BLOOD PRESSURE IS HIGH        ondansetron 4 MG ODT tab    ZOFRAN-ODT     DISSOLVE ONE TABLET BY MOUTH EVERY 4 HOURS AS NEEDED FOR NAUSEA        senna 8.6 MG tablet    SENOKOT     Take 1 tablet by mouth every morning

## 2018-11-09 NOTE — PATIENT INSTRUCTIONS
Preparing for Your Surgery      Name:  Manda Santacruz   MRN:  [id number]   :  1960   Today's Date:  2018     Arriving for surgery:  Surgery date:  18  Arrival time:  5:30AM  Please come to:       [facility] [address]  [address] [place]  [address]    -   parking is available in front of the hospital from 5:15 am to 8:00 pm    -  Stop at the Information Desk in the lobby    -   Inform the information person that you are here for surgery. An escort to 3c will be provided. If you would not like an escort, please proceed to 3C on the 3rd floor. [phone]     What can I eat or drink?  -  You may have solid food or milk products until 8 hours prior to your surgery. Midnight  -  You may have water, apple juice or 7up/Sprite until 2 1/2 hours prior to your surgery. 5:30AM    Which medicines can I take?  Stop Aspirin, vitamins and supplements one week prior to surgery.  Hold Ibuprofen and Naproxen for 24 hours prior to surgery.   -  Do NOT take these medications in the morning, the day of surgery:    Losartan-Hydrochlorothiazide  Senna    -  Please take these medications the day of surgery:    Cefdinir   -As needed:   Hydrocodone  How do I prepare myself?  -  Take two showers: one the night before surgery; and one the morning of surgery.         Use Scrubcare or Hibiclens to wash from neck down.  You may use your own shampoo and conditioner. No other hair products.   -  Do NOT use lotion, powder, deodorant, or antiperspirant the day of your surgery.  -  Do NOT wear any makeup, fingernail polish or jewelry.  -  Begin using Incentive Spirometer 1 week prior to surgery.  Use 4 times per day, up to 5 breaths each time.  Bring Incentive Spirometer to hospital.  - Do not bring your own medications to the hospital, except for inhalers and eye drops.  -  Bring your ID and insurance card.    Questions or Concerns:  -If you have questions or concerns regarding  the day of surgery, please call [phone].     -For questions after surgery please call your surgeons office.           AFTER YOUR SURGERY  Breathing exercises   Breathing exercises help you recover faster. Take deep breaths and let the air out slowly. This will:     Help you wake up after surgery.    Help prevent complications like pneumonia.  Preventing complications will help you go home sooner.   We may give you a breathing device (incentive spirometer) to encourage you to breathe deeply.   Nausea and vomiting   You may feel sick to your stomach after surgery; if so, let your nurse know.    Pain control:  After surgery, you may have pain. Our goal is to help you manage your pain. Pain medicine will help you feel comfortable enough to do activities that will help you heal.  These activities may include breathing exercises, walking and physical therapy.   To help your health care team treat your pain we will ask: 1) If you have pain  2) where it is located 3) describe your pain in your words  Methods of pain control include medications given by mouth, vein or by nerve block for some surgeries.  Sequential Compression Device (SCD) or Pneumo Boots:  You may need to wear SCD S on your legs or feet. These are wraps connected to a machine that pumps in air and releases it. The repeated pumping helps prevent blood clots from forming.     Using an Incentive Spirometer    An incentive spirometer is a device that helps you do deep breathing exercises. These exercises expand your lungs, aid in circulation, and help prevent pneumonia. Deep breathing exercises also help you breathe better and improve the function of your lungs by:    Keeping your lungs clear    Strengthening your breathing muscles    Helping prevent respiratory complications or problems  The incentive spirometer gives you a way to take an active part in your care. A nurse or therapist will teach you breathing exercises. To do these exercises, you will  breathe in through your mouth and not your nose. The incentive spirometer only works correctly if you breathe in through your mouth.    Steps to clear lungs  Step 1. Exhale normally. Then, inhale normally.    Relax and breathe out.  Step 2. Place your lips tightly around the mouthpiece.    Make sure the device is upright and not tilted.  Step 3. Inhale as much air as you can through the mouthpiece (don't breath through your nose).    Inhale slowly and deeply.    Hold your breath long enough to keep the balls or disk raised for at least 3 to 5 seconds, or as instructed by your healthcare provider.  Step 4. Repeat the exercise regularly.  Begin using the Incentive Spirometer one week prior to your surgery, 4 times per day-5 times each.

## 2018-11-09 NOTE — PROGRESS NOTES
WOC Preoperative Ostomy Consult    Referral from Dr. Rm  Consult attended by patient and friend  Diagnosis: Bowel perforation Date of Surgery: 11/13/18  Type of Surgery: Exploratory Laparotomy, Total Abdominal Radical Hysterectomy, Bilateral Salpingo-Oophorectomy, Tumor Debulking, Omentectomy, Possible Lymph Node Dissection, Possible Bowel Resection And Stoma  Emotional readiness for surgery: withdrawn and poor eye contact  Physical Limitations: With the following limitations:  Large abdomen with skin folds  Abdomen assessed for site by: Patient's ability to visiualize area, avoidance of skin creases and scars, palpating for rectus abdominus muscle and clothing fit  Teaching: Anatomy/picture of stoma, stoma/bowel function postop, intro to pouches, diet, precautions with dehydration/blockage, returning to work/lifting, written/media offered and role of WOC/postop followup explained.  Handout given regarding diet and nutrition for all stomas.  Stoma site marking:  Marking pen and tegaderm   Location chosen: [facility] and [facility]    [employer]'s Ostomy packet given to patient    Jud Sorto CNP was available for supervision of care if needed or if questions should arise and regarding plan of care.      Ronald Maddox RN CWON

## 2018-11-09 NOTE — H&P
Pre-Operative H & P     CC:  Preoperative exam to assess for increased cardiopulmonary risk while undergoing surgery and anesthesia.    Date of Encounter: 11/9/2018  Primary Care Physician:  Devonte Carranza    Reason for visit:  Preop general physical exam  Uterine cancer    HPI  Manda Santacruz is a 59 yo female scheduled for Exploratory Laparotomy, Total Abdominal Radical Hysterectomy, Bilateral Salpingo-Oophorectomy, Tumor Debulking, Omentectomy, Possible Lymph Node Dissection, Possible Bowel Resection And Stoma on 11/13/2018 by Dr. Rm in treatment of stage IV uterine cancer      Previous anesthesia without complications  1) Cardiac: HTN, no longer requiring treatment and has been stable. METS over 4  2) Pulmonary: Never smoked, denies pulmonary symptoms   3) GI: small bowel perforation, exploratory lap done at [facility]  4) Endo: DM II, no longer requiring meds. BMI 33.5. A1C 5.5 on 7/2018      Past Medical History  Past Medical History:   Diagnosis Date     Perforated bowel (H)      Uterine cancer (H)        Past Surgical History  Past Surgical History:   Procedure Laterality Date     LAPAROTOMY EXPLORATORY         Hx of Blood transfusions/reactions: none    Hx of abnormal bleeding or anti-platelet use: asa, hold for 7 days    Menstrual history: No LMP recorded. Patient has had a hysterectomy.:     Steroid use in the last year: used last month for chemo    Personal or FH with difficulty with Anesthesia:  None        Prior to Admission Medications  Current Outpatient Prescriptions   Medication Sig Dispense Refill     aspirin 81 MG chewable tablet Take 81 mg by mouth daily (with lunch) ON HOLD FOR SURGERY SINCE 11/07/2018       cefdinir (OMNICEF) 300 MG capsule Take 300 mg by mouth 2 times daily       HYDROcodone-acetaminophen (NORCO) 5-325 MG per tablet Take 1 tablet by mouth as needed       senna (SENOKOT) 8.6 MG tablet Take 1 tablet by mouth every morning        losartan-hydrochlorothiazide  (HYZAAR) 100-12.5 MG per tablet Take 1 tablet by mouth as needed ONLY TAKES WHEN HER BLOOD PRESSURE IS HIGH       ondansetron (ZOFRAN-ODT) 4 MG ODT tab DISSOLVE ONE TABLET BY MOUTH EVERY 4 HOURS AS NEEDED FOR NAUSEA         Allergies  No Known Allergies    Social History  Social History     Social History     Marital status: Single     Spouse name: N/A     Number of children: N/A     Years of education: N/A     Occupational History     Not on file.     Social History Main Topics     Smoking status: Never Smoker     Smokeless tobacco: Never Used     Alcohol use Not on file     Drug use: Not on file     Sexual activity: Not on file     Other Topics Concern     Not on file     Social History Narrative       Family History  No family history on file.    Anesthesia Evaluation     . Pt has had prior anesthetic. Type: General    No history of anesthetic complications          ROS/MED HX    ENT/Pulmonary:  - neg pulmonary ROS     Neurologic:  - neg neurologic ROS     Cardiovascular:     (+) hypertension----. : . . . :. . Previous cardiac testing date:results:date: results:ECG reviewed date: results: date: results:          METS/Exercise Tolerance:  >4 METS   Hematologic:     (+) Anemia, History of Transfusion no previous transfusion reaction -      Musculoskeletal:  - neg musculoskeletal ROS       GI/Hepatic:  - neg GI/hepatic ROS       Renal/Genitourinary:     (+) chronic renal disease, Nephrolithiasis , Other Renal/ Genitourinary, Endometrial Cancer      Endo:     (+) type II DM Last HgA1c: 5.5 date: 7/18/2018 .      Psychiatric:  - neg psychiatric ROS       Infectious Disease:  - neg infectious disease ROS       Malignancy:   (+) Malignancy History of Other  Other CA Endometrial Cancer Active status post Chemo         Other:    (+) C-spine cleared: N/A, no H/O Chronic Pain,no other significant disability                  PHYSICAL EXAM:   Mental Status/Neuro: A/A/O   Airway: Facies: Feasible  Mallampati: I  Mouth/Opening:  "Full  TM distance: < 6 cm  Neck ROM: Full   Respiratory: Auscultation: CTAB     Resp. Rate: Normal     Resp. Effort: Normal      CV: Rhythm: Regular  Rate: Age appropriate  Heart: Normal Sounds   Comments:      Dental: Normal                   The complete review of systems is negative other than noted in the HPI or here.   Temp: 98  F (36.7  C) Temp src: Oral BP: 145/87 Pulse: 70   Resp: 16 SpO2: 100 %         188 lbs 1.6 oz  5' 2.75\"   Body mass index is 33.59 kg/(m^2).       Physical Exam  Constitutional: Awake, alert, cooperative, no apparent distress, and appears stated age.  Eyes: Pupils equal, round and reactive to light, extra ocular muscles intact, sclera clear, conjunctiva normal.  HENT: Normocephalic, oral pharynx with moist mucus membranes, good dentition. No goiter appreciated.   Respiratory: Clear to auscultation bilaterally, no crackles or wheezing.  Cardiovascular: Regular rate and rhythm, normal S1 and S2, and no murmur noted.  Carotids +2, no bruits. No edema. Palpable pulses to radial  DP and PT arteries.   GI: Normal bowel sounds, soft, non-distended, non-tender, no masses palpated, no hepatosplenomegaly.    Lymph/Hematologic: No cervical lymphadenopathy and no supraclavicular lymphadenopathy.  Genitourinary:  Deferred   Skin: Warm and dry.  No rashes at anticipated surgical site.   Musculoskeletal: Full ROM of neck. There is no redness, warmth, or swelling of the joints. Gross motor strength is normal.    Neurologic: Awake, alert, oriented to name, place and time. Cranial nerves II-XII are grossly intact. Gait is normal.   Neuropsychiatric: Calm, cooperative. Normal affect.     Labs: (personally reviewed)  Results for BRITTA WAGNER (MRN [id number]) as of 11/10/2018 15:57   Ref. Range 11/9/2018 12:57   Sodium Latest Ref Range: 133 - 144 mmol/L 137   Potassium Latest Ref Range: 3.4 - 5.3 mmol/L 3.6   Chloride Latest Ref Range: 94 - 109 mmol/L 104   Carbon Dioxide Latest Ref Range: 20 - 32 " mmol/L 26   Urea Nitrogen Latest Ref Range: 7 - 30 mg/dL 13   Creatinine Latest Ref Range: 0.52 - 1.04 mg/dL 0.84   GFR Estimate Latest Ref Range: >60 mL/min/1.7m2 70   GFR Estimate If Black Latest Ref Range: >60 mL/min/1.7m2 84   Calcium Latest Ref Range: 8.5 - 10.1 mg/dL 9.3   Anion Gap Latest Ref Range: 3 - 14 mmol/L 6   Hemoglobin A1C Latest Ref Range: 0 - 5.6 % 5.9 (H)   Glucose Latest Ref Range: 70 - 99 mg/dL 102 (H)   WBC Latest Ref Range: 4.0 - 11.0 10e9/L 4.6   Hemoglobin Latest Ref Range: 11.7 - 15.7 g/dL 11.7   Hematocrit Latest Ref Range: 35.0 - 47.0 % 37.7   Platelet Count Latest Ref Range: 150 - 450 10e9/L 224   RBC Count Latest Ref Range: 3.8 - 5.2 10e12/L 3.97   MCV Latest Ref Range: 78 - 100 fl 95   MCH Latest Ref Range: 26.5 - 33.0 pg 29.5   MCHC Latest Ref Range: 31.5 - 36.5 g/dL 31.0 (L)   RDW Latest Ref Range: 10.0 - 15.0 % 16.1 (H)   INR Latest Ref Range: 0.86 - 1.14  1.07       EKG: Personally reviewed but formal cardiology read pendin2018 SR with [facility] PAC    Outside records reviewed from: care everywhere      ASSESSMENT and PLAN  Manda Santacruz is a 58 year old female scheduled to undergo Exploratory Laparotomy, Total Abdominal Radical Hysterectomy, Bilateral Salpingo-Oophorectomy, Tumor Debulking, Omentectomy, Possible Lymph Node Dissection, Possible Bowel Resection And Stoma. She has the following specific operative considerations:   - RCRI : Intermediate serious cardiac risks.  0.9% risk of major adverse cardiac event.   - Anesthesia considerations:  Refer to PAC assessment in anesthesia records  - VTE risk: 3%  - WILDER # of risks 3/8 = intermediate risk  - Post-op delirium risk: low risk  - Risk of PONV score = 2.  If > 2, anti-emetic intervention recommended.    Pt optimized for surgery. AVS with information on surgery time/arrival time, meds and NPO status given by nursing staff      Patient was discussed with Dr Longoria.    Marley Lozada, APRN CNS  Preoperative  Assessment Center  [facility]  Clinic and Surgery Center  Phone: [phone]  Fax: [phone]

## 2018-11-09 NOTE — MR AVS SNAPSHOT
After Visit Summary   11/9/2018    Manda Santacruz    MRN: [id number]           Patient Information     Date Of Birth          1960        Visit Information        Provider Department      11/9/2018 10:30 AM Pharmacist, Mikael Galvan [facility] Assessment [place]        Today's Diagnoses     Preop examination    -  1       Follow-ups after your visit        Your next 10 appointments already scheduled     Nov 09, 2018 10:30 AM CST   (Arrive by 10:15 AM)   PAC Pharmacist with Mikael Pac Pharmacist   [facility] Preoperative Assessment [place] ([facility])    [address] [place] [id number] [phone]            Nov 09, 2018 11:00 AM CST   (Arrive by 10:45 AM)   PAC EVALUATION with JOANA Porter   [facility] Assessment [place] ([facility])    [address] [place] [id number] [phone]            Nov 09, 2018 12:30 PM CST   NEW OSTOMY NURSE VISIT with Ronald Maddox RN   [facility] Wound Ostomy ([facility])    [address] [place] [id number] [phone]            Nov 13, 2018   Procedure with Prabhjot Rm MD   [facility] [place], Same Day Surgery (--)    [address] [facility] [address] [phone]            Dec 05, 2018 10:40 AM CST   (Arrive by 10:25 AM)   Post-Op with Prabhjot Rm MD   [facility] ([facility])    [address] [place] [address]  [address]  [facility] [id number] [phone]              Who to contact     Please call your clinic at [phone] to:    Ask questions about your health    Make or cancel appointments    Discuss your medicines    Learn about your test results    Speak to your doctor            Additional Information About Your Visit        MyChart Information     [employer] is an electronic gateway that provides easy, online  access to your medical records. With [employer], you can request a clinic appointment, read your test results, renew a prescription or communicate with your care team.     To sign up for [employer] visit the website at [email or web address]   You will be asked to enter the access code listed below, as well as some personal information. Please follow the directions to create your username and password.     Your access code is: [email or web address]  Expires: 2019  7:51 AM     Your access code will  in 90 days. If you need help or a new code, please contact your [facility] or call [phone] for assistance.        Care EveryWhere ID     This is your Care EveryWhere ID. This could be used by other organizations to access your [place] medical records  [id number]         Blood Pressure from Last 3 Encounters:   10/17/18 (!) 150/94   18 128/90   18 153/89    Weight from Last 3 Encounters:   10/17/18 83.9 kg (185 lb)   18 77.3 kg (170 lb 6.4 oz)   18 87.5 kg (192 lb 12.8 oz)              Today, you had the following     No orders found for display       Primary Care Provider Office Phone # Fax #    Devonte [facility] [phone]       [address] [facility] [address]        Equal Access to Services     [facility] HospitalMELISSA : Hadii aad ku hadasho Soomaali, waaxda luqadaha, qaybta kaalmada adeegyada, moy reynoso haypino aquino . So [facility] [phone].    ATENCIÓN: Si habla español, tiene a roach disposición servicios gratuitos de asistencia lingüística. Llame al [phone].    We comply with applicable federal civil rights laws and [place] laws. We do not discriminate on the basis of race, color, national origin, age, disability, sex, sexual orientation, or gender identity.            Thank you!     Thank you for choosing [facility] PREOPERATIVE ASSESSMENT [place]  for your care. Our goal is always to provide you with excellent  care. Hearing back from our patients is one way we can continue to improve our services. Please take a few minutes to complete the written survey that you may receive in the mail after your visit with us. Thank you!             Your Updated Medication List - Protect others around you: Learn how to safely use, store and throw away your medicines at www.disposemymeds.org.          This list is accurate as of 11/8/18  8:56 AM.  Always use your most recent med list.                   Brand Name Dispense Instructions for use Diagnosis    aspirin 81 MG chewable tablet      Take 81 mg by mouth daily        cefdinir 300 MG capsule    OMNICEF     Take 300 mg by mouth 2 times daily        ondansetron 4 MG ODT tab    ZOFRAN-ODT     DISSOLVE ONE TABLET BY MOUTH EVERY 4 HOURS AS NEEDED FOR NAUSEA        prochlorperazine 10 MG tablet    COMPAZINE     10 mg        senna 8.6 MG tablet    SENOKOT     Take 1 tablet by mouth daily as needed for constipation

## 2018-11-13 ENCOUNTER — ANESTHESIA (OUTPATIENT)
Dept: SURGERY | Facility: CLINIC | Age: 58
DRG: 741 | End: 2018-11-13
Payer: COMMERCIAL

## 2018-11-13 ENCOUNTER — SURGERY (OUTPATIENT)
Age: 58
End: 2018-11-13
Payer: COMMERCIAL

## 2018-11-13 ENCOUNTER — HOSPITAL ENCOUNTER (INPATIENT)
Facility: CLINIC | Age: 58
LOS: 3 days | Discharge: HOME | DRG: 741 | End: 2018-11-16
Attending: OBSTETRICS & GYNECOLOGY | Admitting: OBSTETRICS & GYNECOLOGY
Payer: COMMERCIAL

## 2018-11-13 DIAGNOSIS — Z98.890 S/P EXPLORATORY LAPAROTOMY: Primary | ICD-10-CM

## 2018-11-13 DIAGNOSIS — C54.1 ENDOMETRIAL CANCER (H): ICD-10-CM

## 2018-11-13 DIAGNOSIS — K59.03 DRUG-INDUCED CONSTIPATION: ICD-10-CM

## 2018-11-13 LAB
B-HCG SERPL-ACNC: 3 IU/L (ref 0–5)
ERYTHROCYTE [DISTWIDTH] IN BLOOD BY AUTOMATED COUNT: 15.8 % (ref 10–15)
GLUCOSE BLDC GLUCOMTR-MCNC: 114 MG/DL (ref 70–99)
GLUCOSE BLDC GLUCOMTR-MCNC: 160 MG/DL (ref 70–99)
GLUCOSE BLDC GLUCOMTR-MCNC: 195 MG/DL (ref 70–99)
HCT VFR BLD AUTO: 31.5 % (ref 35–47)
HGB BLD-MCNC: 10.4 G/DL (ref 11.7–15.7)
MCH RBC QN AUTO: 30.6 PG (ref 26.5–33)
MCHC RBC AUTO-ENTMCNC: 33 G/DL (ref 31.5–36.5)
MCV RBC AUTO: 93 FL (ref 78–100)
PLATELET # BLD AUTO: 151 10E9/L (ref 150–450)
POTASSIUM SERPL-SCNC: 3.8 MMOL/L (ref 3.4–5.3)
RBC # BLD AUTO: 3.4 10E12/L (ref 3.8–5.2)
WBC # BLD AUTO: 11.2 10E9/L (ref 4–11)

## 2018-11-13 PROCEDURE — 25000128 H RX IP 250 OP 636: Performed by: NURSE ANESTHETIST, CERTIFIED REGISTERED

## 2018-11-13 PROCEDURE — C9290 INJ, BUPIVACAINE LIPOSOME: HCPCS | Performed by: STUDENT IN AN ORGANIZED HEALTH CARE EDUCATION/TRAINING PROGRAM

## 2018-11-13 PROCEDURE — 0DBV0ZZ EXCISION OF MESENTERY, OPEN APPROACH: ICD-10-PCS | Performed by: OBSTETRICS & GYNECOLOGY

## 2018-11-13 PROCEDURE — 88307 TISSUE EXAM BY PATHOLOGIST: CPT | Performed by: OBSTETRICS & GYNECOLOGY

## 2018-11-13 PROCEDURE — 37000008 ZZH ANESTHESIA TECHNICAL FEE, 1ST 30 MIN: Performed by: OBSTETRICS & GYNECOLOGY

## 2018-11-13 PROCEDURE — 27210794 ZZH OR GENERAL SUPPLY STERILE: Performed by: OBSTETRICS & GYNECOLOGY

## 2018-11-13 PROCEDURE — 07BC0ZZ EXCISION OF PELVIS LYMPHATIC, OPEN APPROACH: ICD-10-PCS | Performed by: OBSTETRICS & GYNECOLOGY

## 2018-11-13 PROCEDURE — 25000128 H RX IP 250 OP 636: Performed by: STUDENT IN AN ORGANIZED HEALTH CARE EDUCATION/TRAINING PROGRAM

## 2018-11-13 PROCEDURE — 84702 CHORIONIC GONADOTROPIN TEST: CPT | Performed by: OBSTETRICS & GYNECOLOGY

## 2018-11-13 PROCEDURE — 0UT70ZZ RESECTION OF BILATERAL FALLOPIAN TUBES, OPEN APPROACH: ICD-10-PCS | Performed by: OBSTETRICS & GYNECOLOGY

## 2018-11-13 PROCEDURE — 0DBU0ZZ EXCISION OF OMENTUM, OPEN APPROACH: ICD-10-PCS | Performed by: OBSTETRICS & GYNECOLOGY

## 2018-11-13 PROCEDURE — 12000008 ZZH R&B INTERMEDIATE UMMC

## 2018-11-13 PROCEDURE — 71000014 ZZH RECOVERY PHASE 1 LEVEL 2 FIRST HR: Performed by: OBSTETRICS & GYNECOLOGY

## 2018-11-13 PROCEDURE — 25000125 ZZHC RX 250: Performed by: NURSE ANESTHETIST, CERTIFIED REGISTERED

## 2018-11-13 PROCEDURE — 25000132 ZZH RX MED GY IP 250 OP 250 PS 637: Performed by: STUDENT IN AN ORGANIZED HEALTH CARE EDUCATION/TRAINING PROGRAM

## 2018-11-13 PROCEDURE — 3E1M38X IRRIGATION OF PERITONEAL CAVITY USING IRRIGATING SUBSTANCE, PERCUTANEOUS APPROACH, DIAGNOSTIC: ICD-10-PCS | Performed by: OBSTETRICS & GYNECOLOGY

## 2018-11-13 PROCEDURE — 37000009 ZZH ANESTHESIA TECHNICAL FEE, EACH ADDTL 15 MIN: Performed by: OBSTETRICS & GYNECOLOGY

## 2018-11-13 PROCEDURE — 25000566 ZZH SEVOFLURANE, EA 15 MIN: Performed by: OBSTETRICS & GYNECOLOGY

## 2018-11-13 PROCEDURE — 07BD0ZZ EXCISION OF AORTIC LYMPHATIC, OPEN APPROACH: ICD-10-PCS | Performed by: OBSTETRICS & GYNECOLOGY

## 2018-11-13 PROCEDURE — 40000171 ZZH STATISTIC PRE-PROCEDURE ASSESSMENT III: Performed by: OBSTETRICS & GYNECOLOGY

## 2018-11-13 PROCEDURE — 36000062 ZZH SURGERY LEVEL 4 1ST 30 MIN - UMMC: Performed by: OBSTETRICS & GYNECOLOGY

## 2018-11-13 PROCEDURE — 88305 TISSUE EXAM BY PATHOLOGIST: CPT | Performed by: OBSTETRICS & GYNECOLOGY

## 2018-11-13 PROCEDURE — 25000131 ZZH RX MED GY IP 250 OP 636 PS 637: Performed by: STUDENT IN AN ORGANIZED HEALTH CARE EDUCATION/TRAINING PROGRAM

## 2018-11-13 PROCEDURE — 0UT20ZZ RESECTION OF BILATERAL OVARIES, OPEN APPROACH: ICD-10-PCS | Performed by: OBSTETRICS & GYNECOLOGY

## 2018-11-13 PROCEDURE — 25000128 H RX IP 250 OP 636: Performed by: ANESTHESIOLOGY

## 2018-11-13 PROCEDURE — 0UT90ZZ RESECTION OF UTERUS, OPEN APPROACH: ICD-10-PCS | Performed by: OBSTETRICS & GYNECOLOGY

## 2018-11-13 PROCEDURE — 36000064 ZZH SURGERY LEVEL 4 EA 15 ADDTL MIN - UMMC: Performed by: OBSTETRICS & GYNECOLOGY

## 2018-11-13 PROCEDURE — P9041 ALBUMIN (HUMAN),5%, 50ML: HCPCS | Performed by: NURSE ANESTHETIST, CERTIFIED REGISTERED

## 2018-11-13 PROCEDURE — 25000128 H RX IP 250 OP 636: Performed by: OBSTETRICS & GYNECOLOGY

## 2018-11-13 PROCEDURE — 00000146 ZZHCL STATISTIC GLUCOSE BY METER IP

## 2018-11-13 PROCEDURE — 58954 TAH RAD DEBULK/LYMPH REMOVE: CPT | Mod: GC | Performed by: OBSTETRICS & GYNECOLOGY

## 2018-11-13 PROCEDURE — 85027 COMPLETE CBC AUTOMATED: CPT | Performed by: OBSTETRICS & GYNECOLOGY

## 2018-11-13 PROCEDURE — 88309 TISSUE EXAM BY PATHOLOGIST: CPT | Performed by: OBSTETRICS & GYNECOLOGY

## 2018-11-13 PROCEDURE — 84132 ASSAY OF SERUM POTASSIUM: CPT | Performed by: OBSTETRICS & GYNECOLOGY

## 2018-11-13 RX ORDER — SODIUM CHLORIDE, SODIUM LACTATE, POTASSIUM CHLORIDE, CALCIUM CHLORIDE 600; 310; 30; 20 MG/100ML; MG/100ML; MG/100ML; MG/100ML
INJECTION, SOLUTION INTRAVENOUS CONTINUOUS PRN
Status: DISCONTINUED | OUTPATIENT
Start: 2018-11-13 | End: 2018-11-13

## 2018-11-13 RX ORDER — SODIUM CHLORIDE, SODIUM LACTATE, POTASSIUM CHLORIDE, CALCIUM CHLORIDE 600; 310; 30; 20 MG/100ML; MG/100ML; MG/100ML; MG/100ML
INJECTION, SOLUTION INTRAVENOUS CONTINUOUS
Status: DISCONTINUED | OUTPATIENT
Start: 2018-11-13 | End: 2018-11-13 | Stop reason: HOSPADM

## 2018-11-13 RX ORDER — NALOXONE HYDROCHLORIDE 0.4 MG/ML
.1-.4 INJECTION, SOLUTION INTRAMUSCULAR; INTRAVENOUS; SUBCUTANEOUS
Status: DISCONTINUED | OUTPATIENT
Start: 2018-11-13 | End: 2018-11-14

## 2018-11-13 RX ORDER — FLUMAZENIL 0.1 MG/ML
0.2 INJECTION, SOLUTION INTRAVENOUS
Status: DISCONTINUED | OUTPATIENT
Start: 2018-11-13 | End: 2018-11-13 | Stop reason: HOSPADM

## 2018-11-13 RX ORDER — FENTANYL CITRATE 50 UG/ML
25-50 INJECTION, SOLUTION INTRAMUSCULAR; INTRAVENOUS EVERY 5 MIN PRN
Status: DISCONTINUED | OUTPATIENT
Start: 2018-11-13 | End: 2018-11-13 | Stop reason: HOSPADM

## 2018-11-13 RX ORDER — ONDANSETRON 2 MG/ML
INJECTION INTRAMUSCULAR; INTRAVENOUS PRN
Status: DISCONTINUED | OUTPATIENT
Start: 2018-11-13 | End: 2018-11-13

## 2018-11-13 RX ORDER — LOSARTAN POTASSIUM AND HYDROCHLOROTHIAZIDE 12.5; 1 MG/1; MG/1
1 TABLET ORAL DAILY
Status: DISCONTINUED | OUTPATIENT
Start: 2018-11-14 | End: 2018-11-13

## 2018-11-13 RX ORDER — BISACODYL 10 MG
10 SUPPOSITORY, RECTAL RECTAL DAILY
Status: DISCONTINUED | OUTPATIENT
Start: 2018-11-13 | End: 2018-11-16 | Stop reason: HOSPADM

## 2018-11-13 RX ORDER — IBUPROFEN 600 MG/1
600 TABLET, FILM COATED ORAL EVERY 6 HOURS
Status: DISCONTINUED | OUTPATIENT
Start: 2018-11-13 | End: 2018-11-16 | Stop reason: HOSPADM

## 2018-11-13 RX ORDER — CEFAZOLIN SODIUM 1 G/3ML
1 INJECTION, POWDER, FOR SOLUTION INTRAMUSCULAR; INTRAVENOUS SEE ADMIN INSTRUCTIONS
Status: DISCONTINUED | OUTPATIENT
Start: 2018-11-13 | End: 2018-11-13 | Stop reason: HOSPADM

## 2018-11-13 RX ORDER — CEFAZOLIN SODIUM 2 G/100ML
2 INJECTION, SOLUTION INTRAVENOUS
Status: COMPLETED | OUTPATIENT
Start: 2018-11-13 | End: 2018-11-13

## 2018-11-13 RX ORDER — ALBUMIN, HUMAN INJ 5% 5 %
SOLUTION INTRAVENOUS CONTINUOUS PRN
Status: DISCONTINUED | OUTPATIENT
Start: 2018-11-13 | End: 2018-11-13

## 2018-11-13 RX ORDER — ONDANSETRON 4 MG/1
4 TABLET, ORALLY DISINTEGRATING ORAL EVERY 8 HOURS PRN
Status: DISCONTINUED | OUTPATIENT
Start: 2018-11-14 | End: 2018-11-16 | Stop reason: HOSPADM

## 2018-11-13 RX ORDER — ONDANSETRON 4 MG/1
4 TABLET, ORALLY DISINTEGRATING ORAL EVERY 8 HOURS
Status: COMPLETED | OUTPATIENT
Start: 2018-11-13 | End: 2018-11-14

## 2018-11-13 RX ORDER — NICOTINE POLACRILEX 4 MG
15-30 LOZENGE BUCCAL
Status: DISCONTINUED | OUTPATIENT
Start: 2018-11-13 | End: 2018-11-16 | Stop reason: HOSPADM

## 2018-11-13 RX ORDER — FENTANYL CITRATE 50 UG/ML
INJECTION, SOLUTION INTRAMUSCULAR; INTRAVENOUS PRN
Status: DISCONTINUED | OUTPATIENT
Start: 2018-11-13 | End: 2018-11-13

## 2018-11-13 RX ORDER — BUPIVACAINE HYDROCHLORIDE 2.5 MG/ML
INJECTION, SOLUTION EPIDURAL; INFILTRATION; INTRACAUDAL PRN
Status: DISCONTINUED | OUTPATIENT
Start: 2018-11-13 | End: 2018-11-13

## 2018-11-13 RX ORDER — DIPHENHYDRAMINE HCL 25 MG
25 CAPSULE ORAL EVERY 6 HOURS PRN
Status: DISCONTINUED | OUTPATIENT
Start: 2018-11-13 | End: 2018-11-16 | Stop reason: HOSPADM

## 2018-11-13 RX ORDER — FENTANYL CITRATE 50 UG/ML
25-50 INJECTION, SOLUTION INTRAMUSCULAR; INTRAVENOUS
Status: DISCONTINUED | OUTPATIENT
Start: 2018-11-13 | End: 2018-11-13 | Stop reason: HOSPADM

## 2018-11-13 RX ORDER — FENTANYL CITRATE 50 UG/ML
25-50 INJECTION, SOLUTION INTRAMUSCULAR; INTRAVENOUS EVERY 5 MIN PRN
Status: DISCONTINUED | OUTPATIENT
Start: 2018-11-13 | End: 2018-11-13

## 2018-11-13 RX ORDER — LIDOCAINE 40 MG/G
CREAM TOPICAL
Status: DISCONTINUED | OUTPATIENT
Start: 2018-11-13 | End: 2018-11-16 | Stop reason: HOSPADM

## 2018-11-13 RX ORDER — LIDOCAINE HYDROCHLORIDE 20 MG/ML
INJECTION, SOLUTION INFILTRATION; PERINEURAL PRN
Status: DISCONTINUED | OUTPATIENT
Start: 2018-11-13 | End: 2018-11-13

## 2018-11-13 RX ORDER — SIMETHICONE 80 MG
80 TABLET,CHEWABLE ORAL 4 TIMES DAILY PRN
Status: DISCONTINUED | OUTPATIENT
Start: 2018-11-13 | End: 2018-11-16 | Stop reason: HOSPADM

## 2018-11-13 RX ORDER — SODIUM CHLORIDE, SODIUM LACTATE, POTASSIUM CHLORIDE, CALCIUM CHLORIDE 600; 310; 30; 20 MG/100ML; MG/100ML; MG/100ML; MG/100ML
INJECTION, SOLUTION INTRAVENOUS CONTINUOUS
Status: DISCONTINUED | OUTPATIENT
Start: 2018-11-13 | End: 2018-11-15

## 2018-11-13 RX ORDER — DEXAMETHASONE SODIUM PHOSPHATE 4 MG/ML
INJECTION, SOLUTION INTRA-ARTICULAR; INTRALESIONAL; INTRAMUSCULAR; INTRAVENOUS; SOFT TISSUE PRN
Status: DISCONTINUED | OUTPATIENT
Start: 2018-11-13 | End: 2018-11-13

## 2018-11-13 RX ORDER — ONDANSETRON 4 MG/1
4 TABLET, ORALLY DISINTEGRATING ORAL EVERY 30 MIN PRN
Status: DISCONTINUED | OUTPATIENT
Start: 2018-11-13 | End: 2018-11-13

## 2018-11-13 RX ORDER — HYDROMORPHONE HCL/0.9% NACL/PF 0.2MG/0.2
0.2 SYRINGE (ML) INTRAVENOUS
Status: DISCONTINUED | OUTPATIENT
Start: 2018-11-13 | End: 2018-11-15

## 2018-11-13 RX ORDER — CEFAZOLIN SODIUM 2 G/100ML
2 INJECTION, SOLUTION INTRAVENOUS
Status: DISCONTINUED | OUTPATIENT
Start: 2018-11-13 | End: 2018-11-13 | Stop reason: HOSPADM

## 2018-11-13 RX ORDER — DIPHENHYDRAMINE HYDROCHLORIDE 50 MG/ML
25 INJECTION INTRAMUSCULAR; INTRAVENOUS EVERY 6 HOURS PRN
Status: DISCONTINUED | OUTPATIENT
Start: 2018-11-13 | End: 2018-11-16 | Stop reason: HOSPADM

## 2018-11-13 RX ORDER — ONDANSETRON 4 MG/1
4 TABLET, ORALLY DISINTEGRATING ORAL EVERY 30 MIN PRN
Status: DISCONTINUED | OUTPATIENT
Start: 2018-11-13 | End: 2018-11-13 | Stop reason: HOSPADM

## 2018-11-13 RX ORDER — HYDROMORPHONE HYDROCHLORIDE 1 MG/ML
.3-.5 INJECTION, SOLUTION INTRAMUSCULAR; INTRAVENOUS; SUBCUTANEOUS EVERY 5 MIN PRN
Status: DISCONTINUED | OUTPATIENT
Start: 2018-11-13 | End: 2018-11-13

## 2018-11-13 RX ORDER — SODIUM CHLORIDE, SODIUM LACTATE, POTASSIUM CHLORIDE, CALCIUM CHLORIDE 600; 310; 30; 20 MG/100ML; MG/100ML; MG/100ML; MG/100ML
INJECTION, SOLUTION INTRAVENOUS CONTINUOUS
Status: DISCONTINUED | OUTPATIENT
Start: 2018-11-13 | End: 2018-11-13

## 2018-11-13 RX ORDER — HEPARIN SODIUM 5000 [USP'U]/.5ML
5000 INJECTION, SOLUTION INTRAVENOUS; SUBCUTANEOUS ONCE
Status: COMPLETED | OUTPATIENT
Start: 2018-11-13 | End: 2018-11-13

## 2018-11-13 RX ORDER — HYDROMORPHONE HYDROCHLORIDE 1 MG/ML
.3-.5 INJECTION, SOLUTION INTRAMUSCULAR; INTRAVENOUS; SUBCUTANEOUS EVERY 5 MIN PRN
Status: DISCONTINUED | OUTPATIENT
Start: 2018-11-13 | End: 2018-11-13 | Stop reason: HOSPADM

## 2018-11-13 RX ORDER — AMOXICILLIN 250 MG
2 CAPSULE ORAL 2 TIMES DAILY
Status: DISCONTINUED | OUTPATIENT
Start: 2018-11-13 | End: 2018-11-16 | Stop reason: HOSPADM

## 2018-11-13 RX ORDER — EPHEDRINE SULFATE 50 MG/ML
INJECTION, SOLUTION INTRAMUSCULAR; INTRAVENOUS; SUBCUTANEOUS PRN
Status: DISCONTINUED | OUTPATIENT
Start: 2018-11-13 | End: 2018-11-13

## 2018-11-13 RX ORDER — NALOXONE HYDROCHLORIDE 0.4 MG/ML
.1-.4 INJECTION, SOLUTION INTRAMUSCULAR; INTRAVENOUS; SUBCUTANEOUS
Status: DISCONTINUED | OUTPATIENT
Start: 2018-11-13 | End: 2018-11-16 | Stop reason: HOSPADM

## 2018-11-13 RX ORDER — NALOXONE HYDROCHLORIDE 0.4 MG/ML
.1-.4 INJECTION, SOLUTION INTRAMUSCULAR; INTRAVENOUS; SUBCUTANEOUS
Status: DISCONTINUED | OUTPATIENT
Start: 2018-11-13 | End: 2018-11-13 | Stop reason: HOSPADM

## 2018-11-13 RX ORDER — ONDANSETRON 2 MG/ML
4 INJECTION INTRAMUSCULAR; INTRAVENOUS EVERY 30 MIN PRN
Status: DISCONTINUED | OUTPATIENT
Start: 2018-11-13 | End: 2018-11-13

## 2018-11-13 RX ORDER — ONDANSETRON 2 MG/ML
4 INJECTION INTRAMUSCULAR; INTRAVENOUS EVERY 30 MIN PRN
Status: DISCONTINUED | OUTPATIENT
Start: 2018-11-13 | End: 2018-11-13 | Stop reason: HOSPADM

## 2018-11-13 RX ORDER — ACETAMINOPHEN 325 MG/1
650 TABLET ORAL EVERY 6 HOURS
Status: DISCONTINUED | OUTPATIENT
Start: 2018-11-13 | End: 2018-11-16 | Stop reason: HOSPADM

## 2018-11-13 RX ORDER — PROPOFOL 10 MG/ML
INJECTION, EMULSION INTRAVENOUS PRN
Status: DISCONTINUED | OUTPATIENT
Start: 2018-11-13 | End: 2018-11-13

## 2018-11-13 RX ORDER — OXYCODONE HYDROCHLORIDE 5 MG/1
5-10 TABLET ORAL
Status: DISCONTINUED | OUTPATIENT
Start: 2018-11-13 | End: 2018-11-15

## 2018-11-13 RX ORDER — AMOXICILLIN 250 MG
1 CAPSULE ORAL 2 TIMES DAILY
Status: DISCONTINUED | OUTPATIENT
Start: 2018-11-13 | End: 2018-11-15

## 2018-11-13 RX ORDER — DEXTROSE MONOHYDRATE 25 G/50ML
25-50 INJECTION, SOLUTION INTRAVENOUS
Status: DISCONTINUED | OUTPATIENT
Start: 2018-11-13 | End: 2018-11-16 | Stop reason: HOSPADM

## 2018-11-13 RX ADMIN — ALBUMIN HUMAN: 0.05 INJECTION, SOLUTION INTRAVENOUS at 10:33

## 2018-11-13 RX ADMIN — ONDANSETRON 4 MG: 2 INJECTION INTRAMUSCULAR; INTRAVENOUS at 13:09

## 2018-11-13 RX ADMIN — FENTANYL CITRATE 50 MCG: 50 INJECTION, SOLUTION INTRAMUSCULAR; INTRAVENOUS at 09:54

## 2018-11-13 RX ADMIN — SODIUM CHLORIDE, POTASSIUM CHLORIDE, SODIUM LACTATE AND CALCIUM CHLORIDE: 600; 310; 30; 20 INJECTION, SOLUTION INTRAVENOUS at 15:20

## 2018-11-13 RX ADMIN — SUGAMMADEX 200 MG: 100 INJECTION, SOLUTION INTRAVENOUS at 13:55

## 2018-11-13 RX ADMIN — HYDROMORPHONE HYDROCHLORIDE 0.3 MG: 1 INJECTION, SOLUTION INTRAMUSCULAR; INTRAVENOUS; SUBCUTANEOUS at 15:21

## 2018-11-13 RX ADMIN — FENTANYL CITRATE 100 MCG: 50 INJECTION, SOLUTION INTRAMUSCULAR; INTRAVENOUS at 08:09

## 2018-11-13 RX ADMIN — Medication 0.2 MG: at 17:19

## 2018-11-13 RX ADMIN — ROCURONIUM BROMIDE 20 MG: 10 INJECTION INTRAVENOUS at 09:02

## 2018-11-13 RX ADMIN — SODIUM CHLORIDE, POTASSIUM CHLORIDE, SODIUM LACTATE AND CALCIUM CHLORIDE: 600; 310; 30; 20 INJECTION, SOLUTION INTRAVENOUS at 23:27

## 2018-11-13 RX ADMIN — FENTANYL CITRATE 50 MCG: 50 INJECTION, SOLUTION INTRAMUSCULAR; INTRAVENOUS at 13:57

## 2018-11-13 RX ADMIN — ACETAMINOPHEN 650 MG: 325 TABLET, FILM COATED ORAL at 17:19

## 2018-11-13 RX ADMIN — SENNOSIDES AND DOCUSATE SODIUM 2 TABLET: 8.6; 5 TABLET ORAL at 17:20

## 2018-11-13 RX ADMIN — MAGNESIUM HYDROXIDE 15 ML: 400 SUSPENSION ORAL at 17:21

## 2018-11-13 RX ADMIN — CEFAZOLIN 1 G: 1 INJECTION, POWDER, FOR SOLUTION INTRAMUSCULAR; INTRAVENOUS at 10:25

## 2018-11-13 RX ADMIN — CEFAZOLIN 1 G: 1 INJECTION, POWDER, FOR SOLUTION INTRAMUSCULAR; INTRAVENOUS at 12:25

## 2018-11-13 RX ADMIN — FENTANYL CITRATE 50 MCG: 50 INJECTION, SOLUTION INTRAMUSCULAR; INTRAVENOUS at 08:52

## 2018-11-13 RX ADMIN — HYDROMORPHONE HYDROCHLORIDE 0.25 MG: 1 INJECTION, SOLUTION INTRAMUSCULAR; INTRAVENOUS; SUBCUTANEOUS at 11:44

## 2018-11-13 RX ADMIN — ROCURONIUM BROMIDE 20 MG: 10 INJECTION INTRAVENOUS at 09:50

## 2018-11-13 RX ADMIN — SODIUM CHLORIDE, POTASSIUM CHLORIDE, SODIUM LACTATE AND CALCIUM CHLORIDE: 600; 310; 30; 20 INJECTION, SOLUTION INTRAVENOUS at 07:55

## 2018-11-13 RX ADMIN — ACETAMINOPHEN 650 MG: 325 TABLET, FILM COATED ORAL at 21:58

## 2018-11-13 RX ADMIN — ROCURONIUM BROMIDE 20 MG: 10 INJECTION INTRAVENOUS at 10:41

## 2018-11-13 RX ADMIN — Medication 10 MG: at 09:15

## 2018-11-13 RX ADMIN — BUPIVACAINE HYDROCHLORIDE 20 ML: 2.5 INJECTION, SOLUTION EPIDURAL; INFILTRATION; INTRACAUDAL; PERINEURAL at 07:16

## 2018-11-13 RX ADMIN — PROPOFOL 150 MG: 10 INJECTION, EMULSION INTRAVENOUS at 08:10

## 2018-11-13 RX ADMIN — CEFAZOLIN SODIUM 2 G: 2 INJECTION, SOLUTION INTRAVENOUS at 08:25

## 2018-11-13 RX ADMIN — SODIUM CHLORIDE, POTASSIUM CHLORIDE, SODIUM LACTATE AND CALCIUM CHLORIDE: 600; 310; 30; 20 INJECTION, SOLUTION INTRAVENOUS at 09:36

## 2018-11-13 RX ADMIN — IBUPROFEN 600 MG: 600 TABLET ORAL at 21:58

## 2018-11-13 RX ADMIN — ONDANSETRON 4 MG: 4 TABLET, ORALLY DISINTEGRATING ORAL at 17:19

## 2018-11-13 RX ADMIN — SODIUM CHLORIDE, POTASSIUM CHLORIDE, SODIUM LACTATE AND CALCIUM CHLORIDE: 600; 310; 30; 20 INJECTION, SOLUTION INTRAVENOUS at 14:05

## 2018-11-13 RX ADMIN — Medication 5000 UNITS: at 08:41

## 2018-11-13 RX ADMIN — OXYCODONE HYDROCHLORIDE 5 MG: 5 TABLET ORAL at 23:01

## 2018-11-13 RX ADMIN — ROCURONIUM BROMIDE 10 MG: 10 INJECTION INTRAVENOUS at 13:19

## 2018-11-13 RX ADMIN — ROCURONIUM BROMIDE 20 MG: 10 INJECTION INTRAVENOUS at 11:34

## 2018-11-13 RX ADMIN — LIDOCAINE HYDROCHLORIDE 100 MG: 20 INJECTION, SOLUTION INFILTRATION; PERINEURAL at 08:09

## 2018-11-13 RX ADMIN — ROCURONIUM BROMIDE 10 MG: 10 INJECTION INTRAVENOUS at 12:30

## 2018-11-13 RX ADMIN — PHENYLEPHRINE HYDROCHLORIDE 100 MCG: 10 INJECTION, SOLUTION INTRAMUSCULAR; INTRAVENOUS; SUBCUTANEOUS at 12:03

## 2018-11-13 RX ADMIN — PHENYLEPHRINE HYDROCHLORIDE 100 MCG: 10 INJECTION, SOLUTION INTRAMUSCULAR; INTRAVENOUS; SUBCUTANEOUS at 10:48

## 2018-11-13 RX ADMIN — HYDROMORPHONE HYDROCHLORIDE 0.25 MG: 1 INJECTION, SOLUTION INTRAMUSCULAR; INTRAVENOUS; SUBCUTANEOUS at 13:55

## 2018-11-13 RX ADMIN — IBUPROFEN 600 MG: 600 TABLET ORAL at 17:19

## 2018-11-13 RX ADMIN — MIDAZOLAM 1 MG: 1 INJECTION INTRAMUSCULAR; INTRAVENOUS at 07:09

## 2018-11-13 RX ADMIN — Medication 5 MG: at 11:15

## 2018-11-13 RX ADMIN — HYDROMORPHONE HYDROCHLORIDE 0.5 MG: 1 INJECTION, SOLUTION INTRAMUSCULAR; INTRAVENOUS; SUBCUTANEOUS at 14:08

## 2018-11-13 RX ADMIN — BUPIVACAINE 20 ML: 13.3 INJECTION, SUSPENSION, LIPOSOMAL INFILTRATION at 07:15

## 2018-11-13 RX ADMIN — ALBUMIN HUMAN: 0.05 INJECTION, SOLUTION INTRAVENOUS at 10:55

## 2018-11-13 RX ADMIN — MIDAZOLAM 2 MG: 1 INJECTION INTRAMUSCULAR; INTRAVENOUS at 07:55

## 2018-11-13 RX ADMIN — INSULIN ASPART 2 UNITS: 100 INJECTION, SOLUTION INTRAVENOUS; SUBCUTANEOUS at 20:31

## 2018-11-13 RX ADMIN — PHENYLEPHRINE HYDROCHLORIDE 200 MCG: 10 INJECTION, SOLUTION INTRAMUSCULAR; INTRAVENOUS; SUBCUTANEOUS at 12:06

## 2018-11-13 RX ADMIN — DEXAMETHASONE SODIUM PHOSPHATE 6 MG: 4 INJECTION, SOLUTION INTRA-ARTICULAR; INTRALESIONAL; INTRAMUSCULAR; INTRAVENOUS; SOFT TISSUE at 08:47

## 2018-11-13 RX ADMIN — ROCURONIUM BROMIDE 50 MG: 10 INJECTION INTRAVENOUS at 08:10

## 2018-11-13 RX ADMIN — FENTANYL CITRATE 50 MCG: 50 INJECTION, SOLUTION INTRAMUSCULAR; INTRAVENOUS at 13:59

## 2018-11-13 RX ADMIN — FENTANYL CITRATE 50 MCG: 50 INJECTION INTRAMUSCULAR; INTRAVENOUS at 07:09

## 2018-11-13 RX ADMIN — HYDROMORPHONE HYDROCHLORIDE 0.25 MG: 1 INJECTION, SOLUTION INTRAMUSCULAR; INTRAVENOUS; SUBCUTANEOUS at 12:42

## 2018-11-13 RX ADMIN — HYDROMORPHONE HYDROCHLORIDE 0.25 MG: 1 INJECTION, SOLUTION INTRAMUSCULAR; INTRAVENOUS; SUBCUTANEOUS at 13:46

## 2018-11-13 ASSESSMENT — ACTIVITIES OF DAILY LIVING (ADL): ADLS_ACUITY_SCORE: 9

## 2018-11-13 NOTE — IP AVS SNAPSHOT
MRN:[id number]                      After Visit Summary   11/13/2018    Manda Santacruz    MRN: [id number]           Thank you!     Thank you for choosing [facility] for your care. Our goal is always to provide you with excellent care. Hearing back from our patients is one way we can continue to improve our services. Please take a few minutes to complete the written survey that you may receive in the mail after you visit with us. Thank you!        Patient Information     Date Of Birth          1960        Designated Caregiver       Most Recent Value    Caregiver    Will someone help with your care after discharge? yes    Name of designated caregiver Chayo    Phone number of caregiver [phone]    Caregiver address [address] [facility] [address]      About your hospital stay     You were admitted on:  November 13, 2018 You last received care in the:  [address] [facility]    You were discharged on:  November 16, 2018        Reason for your hospital stay       Surgery                  Who to Call     For medical emergencies, please call 911.  For non-urgent questions about your medical care, please call your primary care provider or clinic, [phone]  For questions related to your surgery, please call your surgery clinic        Attending Provider     Provider Specialty    Prabhjot Rm MD Obstetrics & Gynecology, Maternal & Fetal Medicine    Moriah Segura MD OB/Gyn       Primary Care Provider Office Phone # Fax Jeanette Muro [phone]      After Care Instructions     Activity       Your activity upon discharge: as tolerated            Diet       Follow this diet upon discharge: regular            Discharge Instructions       GENERAL POST-OPERATIVE PATIENT INSTRUCTIONS FOLLOW-UP:     If you have any of the following, call the gynecology oncology office (if the clinic is closed, you can call the hospital at [phone] and ask for the GYN Oncology  resident on call):   - Temperature greater than 100.4  - Persistent nausea and vomiting   - Severe uncontrolled pain   - Redness, tenderness, or signs of infection (pain, swelling, redness, odor or green/yellow discharge around the site)   - Difficulty breathing, headache or visual disturbances   - Hives   - Persistent dizziness or light-headedness   - Extreme fatigue     In an emergency, call 911 or go to an Emergency Department at a nearby hospital        WOUND CARE INSTRUCTIONS:  Keep a dry clean dressing on the wound if there is drainage. If you had a bandage initially, it may be removed after 24 hours.  Once the wound has quit draining you may leave it open to air.  If clothing rubs against the wound or causes irritation and the wound is not draining you may cover it with a dry dressing during the daytime.  Try to keep the wound dry and avoid ointments on the wound unless directed to do so.  If the wound becomes bright red and painful or starts to drain infected material that is not clear, please contact your physician immediately.     1.  You may shower 24 hrs after surgery   2.  No soaking in the tub for 4 weeks      DIET:  There are no dietary restrictions.  You may eat any foods that you can tolerate unless instructed otherwise.  It is a good idea to eat a high fiber diet and take in plenty of fluids to prevent constipation.  If you become constipated, please follow the instructions below.      ACTIVITY:  You are encouraged to cough, deep breath and use your incentive spirometer if you were given one, every 15-30 minutes when awake.  This will help prevent respiratory complications and low grade fevers post-operatively.  You may want to hug a pillow when coughing and sneezing to add additional support to the surgical area, if you had abdominal surgery, which will decrease pain during these times.      1.  No heavy lifting >20lbs or strenuous exercise for six-eight weeks.  No exercise in which you are using  core muscles (yoga, pilates, swimming, weight lifting)   2.  You may walk as much as you wish.  You are encouraged to increase your activity each day after surgery.  Stairs are okay.       MEDICATIONS:  Try to take narcotic medications and anti-inflammatory medications, such as tylenol, ibuprofen, naprosyn, etc., with food.  This will minimize stomach upset from the medication.  Should you develop nausea and vomiting from the pain medication, or develop a rash, please discontinue the medication and contact your physician.  You should not drive, make important decisions, or operate machinery when taking narcotic pain medication.      OTHER:  Patients are often constipated after general anesthesia and surgery. You should continue to take stool softeners (for example, Senokot-S) for the next six weeks (unless diarrhea develops) and consume adequate amounts of water.  If the you remain constipated or unable to pass stool, please try one or all of the following measures: 1.  Milk of Magnesia 30cc twice a day as needed by mouth 2.  Metamucil 2 tablespoons in 12 ounces of fluid 3.  Dulcolax oral or suppositories 4.  Prunes or prune juice 5.  Miralax daily   QUESTIONS:  Please feel free to call your physician or the hospital  if you have any questions, and they will be glad to assist you.                  Follow-up Appointments     Follow Up and recommended labs and tests       Follow-up on 12/5/18 with Dr. Rm                  Your next 10 appointments already scheduled     Dec 05, 2018 10:40 AM CST   (Arrive by 10:25 AM)   Post-Op with Prabhjot Rm MD   [facility] ([facility])    [address] [place] [address]  [address] [place] [id number] [phone]              Additional Information     If you use hormonal birth control (such as the pill, patch, ring or implants): You'll need a second form of birth control for 7 days (condoms, a diaphragm or  "contraceptive foam). While in the hospital, you received a medicine called Bridion. Your normal birth control will not work as well for a week after taking this medicine.          Pending Results     Date and Time Order Name Status Description    2018 1029 Surgical pathology exam In process             Statement of Approval     Ordered          18 0810  I have reviewed and agree with all the recommendations and orders detailed in this document.  EFFECTIVE NOW     Approved and electronically signed by:  Luke Reynoso MD             Admission Information     Date & Time Provider Department Dept. Phone    2018 Moriah Segura MD [address] [facility] [place] [phone]      Your Vitals Were     Blood Pressure Pulse Temperature Respirations Height Weight    148/80 (BP Location: Left arm) 80 97.6  F (36.4  C) (Oral) 16 1.626 m (5' 4\") 86.4 kg (190 lb 6.4 oz)    Pulse Oximetry BMI (Body Mass Index)                97% 32.68 kg/m2          [email or web address] Information     [employer] lets you send messages to your doctor, view your test results, renew your prescriptions, schedule appointments and more. To sign up, go to [email or web address] . Click on \"Log in\" on the left side of the screen, which will take you to the Welcome page. Then click on \"Sign up Now\" on the right side of the page.     You will be asked to enter the access code listed below, as well as some personal information. Please follow the directions to create your username and password.     Your access code is: [email or web address]  Expires: 2019  7:51 AM     Your access code will  in 90 days. If you need help or a new code, please call your [facility] clinic or [phone].        Care EveryWhere ID     This is your Care EveryWhere ID. This could be used by other organizations to access your [facility] medical records  [id number]        Equal Access to Services     KIMBERLEE RICHARDS AH: Montse Faulkner, marilyn sosa, josefina espitia, " moy reynoso hayaan aderafael khangelysh la'aan ah. So [facility] [place] [phone].    ATENCIÓN: Si geovani lockhart, tiene a roach disposición servicios gratuitos de asistencia lingüística. Juliana al [phone].    We comply with applicable federal civil rights laws and [place] laws. We do not discriminate on the basis of race, color, national origin, age, disability, sex, sexual orientation, or gender identity.               Review of your medicines      START taking        Dose / Directions    acetaminophen 325 MG tablet   Commonly known as:  TYLENOL        Dose:  650 mg   Take 2 tablets (650 mg) by mouth every 6 hours as needed for mild pain   Quantity:  60 tablet   Refills:  0       enoxaparin 40 MG/0.4ML injection   Commonly known as:  LOVENOX   Used for:  Endometrial cancer (H)        Dose:  40 mg   Inject 0.4 mLs (40 mg) Subcutaneous every 24 hours   Quantity:  10 mL   Refills:  0       ibuprofen 600 MG tablet   Commonly known as:  ADVIL/MOTRIN        Dose:  600 mg   Take 1 tablet (600 mg) by mouth every 6 hours   Quantity:  60 tablet   Refills:  0       oxyCODONE IR 5 MG tablet   Commonly known as:  ROXICODONE        Dose:  5-10 mg   Take 1-2 tablets (5-10 mg) by mouth every 6 hours as needed   Quantity:  20 tablet   Refills:  0       senna-docusate 8.6-50 MG per tablet   Commonly known as:  SENOKOT-S;PERICOLACE   Used for:  Drug-induced constipation        Dose:  2 tablet   Take 2 tablets by mouth 2 times daily   Quantity:  100 tablet   Refills:  0         CONTINUE these medicines which have NOT CHANGED        Dose / Directions    aspirin 81 MG chewable tablet   Indication:  Kawasaki Syndrome        Dose:  81 mg   Take 81 mg by mouth daily (with lunch) ON HOLD FOR SURGERY SINCE 11/07/2018   Refills:  0       cefdinir 300 MG capsule   Commonly known as:  OMNICEF        Dose:  300 mg   Take 300 mg by mouth 2 times daily   Refills:  0       losartan-hydrochlorothiazide 100-12.5 MG per tablet   Commonly known as:  HYZAAR         "Dose:  1 tablet   Take 1 tablet by mouth as needed ONLY TAKES WHEN HER BLOOD PRESSURE IS HIGH   Refills:  0       ondansetron 4 MG ODT tab   Commonly known as:  ZOFRAN-ODT        DISSOLVE ONE TABLET BY MOUTH EVERY 4 HOURS AS NEEDED FOR NAUSEA   Refills:  0       senna 8.6 MG tablet   Commonly known as:  SENOKOT        Dose:  1 tablet   Take 1 tablet by mouth every morning   Refills:  0         STOP taking     HYDROcodone-acetaminophen 5-325 MG per tablet   Commonly known as:  NORCO                Where to get your medicines      These medications were sent to [facility] [place], [place] - [address]  [address] [address]     Phone:  [phone]     acetaminophen 325 MG tablet    enoxaparin 40 MG/0.4ML injection    ibuprofen 600 MG tablet    senna-docusate 8.6-50 MG per tablet         Some of these will need a paper prescription and others can be bought over the counter. Ask your nurse if you have questions.     Bring a paper prescription for each of these medications     oxyCODONE IR 5 MG tablet                Protect others around you: Learn how to safely use, store and throw away your medicines at www.disposemymeds.org.        Information about your nerve block     Today you received a block to numb the nerves near your surgery site.    This is a block using local anesthetic or \"numbing\" medication injected around the nerves to anesthetize or \"numb\" the area supplied by those nerves. This block is injected into the muscle layer near your surgical site. The type of anesthesia (Exparel) your anesthesia team used to numb your abdomen may give you relief for up to 72 hours.     Diet: There are no diet restrictions, but you should drink plenty of fluids, unless you are on a fluid-restricted diet.     Activity: If your surgical site is an arm or leg you should be careful with your affected limb, since it is possible to injure your limb without being aware of it due to " the numbing. Until full feeling returns, you should guard against bumping or hitting your limb, and avoid extreme hot or cold temperatures on the skin.    Pain Medication: As the block wears off, the feeling will return as a tingling or prickly sensation near your surgical site. You will experience more discomfort from your incisions as the feeling returns. You may want to take a pain pill (a narcotic or Tylenol if this was prescribed by your surgeon) when you start to experience mild pain, before the pain becomes more severe. If your pain medications do not control your pain, you should notify your surgeon. If you are taking narcotics for pain management, do not drink alcohol, drive a car, or perform hazardous activities.  If you have questions or concerns you may call your surgeon at the number provided with your discharge instructions.     Call your surgeon if you experience blurry vision, ringing in the ears or metallic taste in your mouth.         Information about OPIOIDS     PRESCRIPTION OPIOIDS: WHAT YOU NEED TO KNOW   We gave you an opioid (narcotic) pain medicine. It is important to manage your pain, but opioids are not always the best choice. You should first try all the other options your care team gave you. Take this medicine for as short a time (and as few doses) as possible.    Some activities can increase your pain, such as bandage changes or therapy sessions. It may help to take your pain medicine 30 to 60 minutes before these activities. Reduce your stress by getting enough sleep, working on hobbies you enjoy and practicing relaxation or meditation. Talk to your care team about ways to manage your pain beyond prescription opioids.    These medicines have risks:    DO NOT drive when on new or higher doses of pain medicine. These medicines can affect your alertness and reaction times, and you could be arrested for driving under the influence (DUI). If you need to use opioids long-term, talk to your  care team about driving.    DO NOT operate heavy machinery    DO NOT do any other dangerous activities while taking these medicines.    DO NOT drink any alcohol while taking these medicines.     If the opioid prescribed includes acetaminophen, DO NOT take with any other medicines that contain acetaminophen. Read all labels carefully. Look for the word  acetaminophen  or  Tylenol.  Ask your pharmacist if you have questions or are unsure.    You can get addicted to pain medicines, especially if you have a history of addiction (chemical, alcohol or substance dependence). Talk to your care team about ways to reduce this risk.    All opioids tend to cause constipation. Drink plenty of water and eat foods that have a lot of fiber, such as fruits, vegetables, prune juice, apple juice and high-fiber cereal. Take a laxative (Miralax, milk of magnesia, Colace, Senna) if you don t move your bowels at least every other day. Other side effects include upset stomach, sleepiness, dizziness, throwing up, tolerance (needing more of the medicine to have the same effect), physical dependence and slowed breathing.    Store your pills in a secure place, locked if possible. We will not replace any lost or stolen medicine. If you don t finish your medicine, please throw away (dispose) as directed by your pharmacist. The [place] Pollution Control Agency has more information about safe disposal: https://www.pca.[facility].[place].us/living-green/managing-unwanted-medications             Medication List: This is a list of all your medications and when to take them. Check marks below indicate your daily home schedule. Keep this list as a reference.      Medications           Morning Afternoon Evening Bedtime As Needed    acetaminophen 325 MG tablet   Commonly known as:  TYLENOL   Take 2 tablets (650 mg) by mouth every 6 hours as needed for mild pain   Last time this was given:  650 mg on 11/15/2018 10:11 AM   Next Dose Due:  Can take a dose whenever  you need to                                   aspirin 81 MG chewable tablet   Take 81 mg by mouth daily (with lunch) ON HOLD FOR SURGERY SINCE 11/07/2018                                cefdinir 300 MG capsule   Commonly known as:  OMNICEF   Take 300 mg by mouth 2 times daily                                enoxaparin 40 MG/0.4ML injection   Commonly known as:  LOVENOX   Inject 0.4 mLs (40 mg) Subcutaneous every 24 hours   Last time this was given:  40 mg on 11/16/2018  8:56 AM   Next Dose Due:  Next dose 11/17 at 800AM                                   ibuprofen 600 MG tablet   Commonly known as:  ADVIL/MOTRIN   Take 1 tablet (600 mg) by mouth every 6 hours   Last time this was given:  600 mg on 11/15/2018 10:11 AM   Next Dose Due:  You can take a dose whenever                                   losartan-hydrochlorothiazide 100-12.5 MG per tablet   Commonly known as:  HYZAAR   Take 1 tablet by mouth as needed ONLY TAKES WHEN HER BLOOD PRESSURE IS HIGH                                ondansetron 4 MG ODT tab   Commonly known as:  ZOFRAN-ODT   DISSOLVE ONE TABLET BY MOUTH EVERY 4 HOURS AS NEEDED FOR NAUSEA   Last time this was given:  4 mg on 11/14/2018  8:14 AM                                oxyCODONE IR 5 MG tablet   Commonly known as:  ROXICODONE   Take 1-2 tablets (5-10 mg) by mouth every 6 hours as needed   Last time this was given:  5 mg on 11/16/2018 10:02 AM   Next Dose Due:  You can have a 5mg dose whenever. You have been taking 1 pill each time.                                    senna 8.6 MG tablet   Commonly known as:  SENOKOT   Take 1 tablet by mouth every morning                                senna-docusate 8.6-50 MG per tablet   Commonly known as:  SENOKOT-S;PERICOLACE   Take 2 tablets by mouth 2 times daily   Last time this was given:  2 tablets on 11/16/2018  8:56 AM   Next Dose Due:  Next dose 11/17 800AM

## 2018-11-13 NOTE — PROGRESS NOTES
"Post-Op Check      Manda Santacruz is a 58 year old with stage IVB uterine serous carcinoma POD#0 s/p exploratory laparotomy, modified radical hysterectomy, bilateral salpingo-oophorectomy, bilateral ureterolysis, bilateral pelvic and para-aortic lymph node dissection, omentectomy, liver mobilization and tumor debulking to no gross residual disease.    S: Pt doing well with pain well controlled. Has some pain under her left ribs which she thinks is from laying on her back for prolonged period of time. Denies any nausea, shortness of breath and chest pain. Has not yet ambulated. Tolerating some water without any N/V.    O:    /68 (BP Location: Right arm)  Temp 95.4  F (35.2  C) (Oral)  Resp 22  Ht 1.626 m (5' 4\")  Wt 85.9 kg (189 lb 6 oz)  SpO2 97%  BMI 32.51 kg/m2; 3 L NC    I/O last 3 completed shifts:  In: 2500 [I.V.:2000]  Out: 1025 [Urine:425; Blood:600]    General:  A&Ox3, NAD  CV:  RRR, no m/r/g  Pulm:  CTAB, good inspiratory effort  Abd: soft, appropriately tender to palpation, nondistended.  No rebound or guarding  Incision: vertical midline incision covered with dressing with small amount of serosanguinous drainage.  Lines: R hand peripheral IV, corona  LE: no edema, no calf tenderness    A:  58 year old F, POD#0 s/p exploratory laparotomy with modified-radical hysterectomy, bilateral salpingo-oophorectomy, bilateral ureterolysis, bilateral pelvic and para-aortic lymph node dissection, omentectomy, liver mobilization and tumor debulking to no gross residual disease. She is doing well in the immediate postoperative period. Discussed surgery.    P:      Dz: Stage IVB uterine serous carcinoma s/p 6 cycles of neoadjuvant chemotherapy    FEN: ADAT, IVF LR @125ml/hr. Can decrease IVF once tolerating more PO.  Pain: s/p TAP block. Tylenol, ibuprofen, oxycodone, IV dilaudid prn.  Heme: Hgb 11.7 (11/9)>   CV: HTN, previously on medications but discontinued losartan-hydrochlorothiazide after " weight loss. Takes it prn.  Pulm: No issues.  GI: clears, ADAT. Bowel regimen, zofran PRN for nausea.  : corona in place. Plan removal POD#3.  ID: Afebrile.   Endo: DM (A1c 5.9 11/9), no meds; no issues  Psych/Neuro: arthritis; no issues  PPX: SCDs, PPI, IS, lovenox in AM if Hgb stable  Dispo: Discharge home when meeting postoperative milestones    Luke Reynoso MD  OB/GYN Resident, PGY-3  11/13/2018 5:22 PM

## 2018-11-13 NOTE — ANESTHESIA POSTPROCEDURE EVALUATION
Anesthesia POST Procedure Evaluation    Patient: Manda Santacruz   MRN:     [id number] Gender:   female   Age:    58 year old :      1960        Preoperative Diagnosis: Endometrial Cancer    Procedure(s):  Exploratory Laparotomy, Lysis of adhesions x 45minutes, Total Abdominal Radical Hysterectomy, Bilateral Salpingo-Oophorectomy, Tumor Debulking, Omentectomy, Bilateral Pelvic and Para Aortic Lymph Node Dissection, Bilateral Ureterolysis, Liver mobilization   Postop Comments: No value filed.       Anesthesia Type:  General    Reportable Event: NO     PAIN: Uncomplicated   Sign Out status: Comfortable, Well controlled pain     PONV: No PONV   Sign Out status:  No Nausea or Vomiting     Neuro/Psych: Uneventful perioperative course   Sign Out Status: Preoperative baseline; Age appropriate mentation     Airway/Resp.: Uneventful perioperative course   Sign Out Status: Non labored breathing, age appropriate RR; Resp. Status within EXPECTED Parameters     CV: Uneventful perioperative course   Sign Out status: Appropriate BP and perfusion indices; Appropriate HR/Rhythm     Disposition:   Sign Out in:  PACU  Disposition:  Floor  Recovery Course: Uneventful  Follow-Up: Not required           Last Anesthesia Record Vitals:  CRNA VITALS  2018 1357 - 2018 1450      2018             Pulse: 96    SpO2: 98 %          Last PACU/Preop Vitals:  Vitals:    18 0720 18 0725 18 1430   BP: (!) 148/97 148/88 117/76   Resp: 18 14 16   Temp:   36.9  C (98.4  F)   SpO2: 100% 100% 97%         Electronically Signed By: Susu Lomabrdi MD, 2018, 2:50 PM

## 2018-11-13 NOTE — ANESTHESIA PROCEDURE NOTES
Peripheral Nerve Block Procedure Note    Staff:     Anesthesiologist:  DRU REINOSO    Resident/CRNA:  SUNITA COURTNEY    Block performed by resident/CRNA in the presence of a teaching physician    Location: Pre-op  Procedure Start/Stop TImes:      11/13/2018 7:05 AM     11/13/2018 7:14 AM    patient identified, IV checked, site marked, risks and benefits discussed, informed consent, monitors and equipment checked, pre-op evaluation, at physician/surgeon's request and post-op pain management      Correct Patient: Yes      Correct Position: Yes      Correct Site: Yes      Correct Procedure: Yes      Correct Laterality:  Yes    Site Marked:  Yes  Procedure details:     Procedure:  TAP    ASA:  2    Laterality:  Bilateral    Position:  Supine    Sterile Prep: mask and sterile gloves      Needle:  Touhy needle and insulated    Needle gauge:  20    Needle length (mm):  110    Ultrasound: Yes      Ultrasound used to identify targeted nerve, plexus, or vascular structure and placed a needle adjacent to it      Permanent Image entered into patiient's record      Abnormal pain on injection: No      Blood Aspirated: No      Paresthesias:  No    Bleeding at site: No      Test dose negative for signs of intravascular injection: Yes      Bolus via:  Needle    Infusion Method:  Single Shot    Blood aspirated via catheter: No      Complications:  None

## 2018-11-13 NOTE — DISCHARGE SUMMARY
Gynecologic Oncology Discharge Summary    Manda Santacruz  [id number]    Admit Date: 11/13/2018  Discharge Date: 11/16/2018   Admitting Provider: Dr. Segura  Discharge Provider: Dr. Rm    Admission Dx:   -Stage IVB uterine serous carcinoma  -Status post 6 cycles of neoadjuvant chemotherapy  -History of jejunal perforation with primary repair    Discharge Dx:  -Stage IVB uterine serous carcinoma  -Status post 6 cycles of neoadjuvant chemotherapy  -History of jejunal perforation with primary repair    Patient Active Problem List   Diagnosis     Bowel perforation (H)     S/P exploratory laparotomy     Procedures: Exploratory laparotomy with modified-radical hysterectomy, bilateral salpingo-oophorectomy, bilateral ureterolysis, bilateral pelvic and para-aortic lymph node dissection, omentectomy, liver mobilization and tumor debulking to no gross residual disease.    Prior to Admission Medications:  Prescriptions Prior to Admission   Medication Sig Dispense Refill Last Dose     cefdinir (OMNICEF) 300 MG capsule Take 300 mg by mouth 2 times daily   11/13/2018 at AM     HYDROcodone-acetaminophen (NORCO) 5-325 MG per tablet Take 1 tablet by mouth as needed   Past Month at Unknown time     senna (SENOKOT) 8.6 MG tablet Take 1 tablet by mouth every morning    11/12/2018 at AM     aspirin 81 MG chewable tablet Take 81 mg by mouth daily (with lunch) ON HOLD FOR SURGERY SINCE 11/07/2018 11/7/2018     losartan-hydrochlorothiazide (HYZAAR) 100-12.5 MG per tablet Take 1 tablet by mouth as needed ONLY TAKES WHEN HER BLOOD PRESSURE IS HIGH   More than a month at Unknown time     ondansetron (ZOFRAN-ODT) 4 MG ODT tab DISSOLVE ONE TABLET BY MOUTH EVERY 4 HOURS AS NEEDED FOR NAUSEA   Unknown at Unknown time     Discharge Medications:     Review of your medicines      START taking       Dose / Directions    acetaminophen 325 MG tablet   Commonly known as:  TYLENOL        Dose:  650 mg   Take 2 tablets (650 mg) by mouth  every 6 hours as needed for mild pain   Quantity:  60 tablet   Refills:  0       enoxaparin 40 MG/0.4ML injection   Commonly known as:  LOVENOX   Used for:  Endometrial cancer (H)        Dose:  40 mg   Inject 0.4 mLs (40 mg) Subcutaneous every 24 hours for 25 days   Quantity:  10 mL   Refills:  0       ibuprofen 600 MG tablet   Commonly known as:  ADVIL/MOTRIN        Dose:  600 mg   Take 1 tablet (600 mg) by mouth every 6 hours   Quantity:  60 tablet   Refills:  0       oxyCODONE IR 5 MG tablet   Commonly known as:  ROXICODONE        Dose:  5-10 mg   Take 1-2 tablets (5-10 mg) by mouth every 6 hours as needed   Quantity:  20 tablet   Refills:  0       senna-docusate 8.6-50 MG per tablet   Commonly known as:  SENOKOT-S;PERICOLACE   Used for:  Drug-induced constipation        Dose:  2 tablet   Take 2 tablets by mouth 2 times daily   Quantity:  100 tablet   Refills:  0         CONTINUE these medicines which have NOT CHANGED       Dose / Directions    aspirin 81 MG chewable tablet   Indication:  Kawasaki Syndrome        Dose:  81 mg   Take 81 mg by mouth daily (with lunch) ON HOLD FOR SURGERY SINCE 11/07/2018   Refills:  0       cefdinir 300 MG capsule   Commonly known as:  OMNICEF        Dose:  300 mg   Take 300 mg by mouth 2 times daily   Refills:  0       losartan-hydrochlorothiazide 100-12.5 MG per tablet   Commonly known as:  HYZAAR        Dose:  1 tablet   Take 1 tablet by mouth as needed ONLY TAKES WHEN HER BLOOD PRESSURE IS HIGH   Refills:  0       ondansetron 4 MG ODT tab   Commonly known as:  ZOFRAN-ODT        DISSOLVE ONE TABLET BY MOUTH EVERY 4 HOURS AS NEEDED FOR NAUSEA   Refills:  0       senna 8.6 MG tablet   Commonly known as:  SENOKOT        Dose:  1 tablet   Take 1 tablet by mouth every morning   Refills:  0         STOP taking          HYDROcodone-acetaminophen 5-325 MG per tablet   Commonly known as:  NORCO                Where to get your medicines      These medications were sent to [facility] [place], [place] - [address]  [address], [facility] [address]    Hours:  TRANSPLANT PHONE NUMBER [phone] Phone:  [phone]      acetaminophen 325 MG tablet     enoxaparin 40 MG/0.4ML injection     ibuprofen 600 MG tablet     senna-docusate 8.6-50 MG per tablet         Some of these will need a paper prescription and others can be bought over the counter. Ask your nurse if you have questions.     Bring a paper prescription for each of these medications      oxyCODONE IR 5 MG tablet           Consultations:  None    Brief History of Illness:  Manda Santacruz is a 58 year old who was admitted for the above stated procedure.    Hospital Course:  Dz:   - Preoperative diagnosis was stage IVB serous uterine carcinoma. Final pathology is pending at the time of discharge. She will follow-up postoperatively for a care plan.  FEN:   - She was maintained on IVF until POD#1, when her diet was slowly advanced.  By discharge, she was tolerating a regular diet without nausea and vomiting and able to maintain her hydration without IVF supplementation.  Pain:   - She received a TAP block and her pain was initially controlled with oral and IV pain medications.  By discharge, her pain was adequately controlled on PO pain regimen.  Her pain was well controlled on this and she was discharged home with these medications.  CV:   - She has a history of hypertension which she had previously taken antihypertensives. These were discontinued after she had lost weight and had improved controlled of blood pressures. Her blood pressures during admission were normotensive to mildly elevated. She required no antihypertensives during her admission. She has no history of CV issues.  Her vital signs were stable while in house and she had no acute CV issues.  PULM:   - She has no history of pulmonary issues.  She was initially given O2 supplementation in to maintain her O2 sats in  the immediate postop period and was transitioned off of this without difficulty.  By discharge, her O2 sats were greater than 94% on RA.  She was encouraged to use her bedside IS while in house.  She had no acute pulmonary issues while in house.  HEME:   - Her preoperative Hgb was 11.7.  Her hgb dropped to 10 postoperatively. She was hemodynamically stable during her admission. She had no other acute heme issues while in house.  GI:   - She was made NPO prior to the procedure.  On POD#0, her diet was advanced to clear liquids and then advanced slowly as tolerated.  At the time of discharge, she was tolerating a regular diet without nausea and vomiting.  She will be discharged with a bowel regimen to prevent constipation in the postoperative period.  She had no acute GI issues while in house.  :    - A corona catheter was placed at the time of the surgery. Corona was removed POD#3 and she passed a trial of void. Prior to discharge, the patient was voiding spontaneously without difficulty.  She had no acute  issues while in house.  ID:   - The patient was AF during her hospitalization.  She received standard preoperative antibiotics without incident.    ENDO:   - She has a history of diabetes for which she was previously on insulin and then no longer needed after weight loss. She was monitored with QID blood glucose checks through POD#2 and required minimal sliding scale insulin. By POD#2 her BG were in normal range and checks were discontinue. She had no other endocrine issues during her admission.  PSYCH/NEURO:   - No issues  PPX:    - She was given SCDs, IS, PPI and lovenox during her hospital course.  She tolerated these prophylactic interventions without incident.  Plan to continue lovenox for a total of 28d    Discharge Instructions and Follow up:  Ms. Manda Santacruz was discharged from the hospital in good condition.    Discharge Diet: Regular  Discharge Activity: Activity as tolerated  Discharge Follow  up: Postoperative follow-up with Dr. Rm on 12/5/18    Discharge Disposition:  Discharged to home    Discharge Staff: Dr. Jag Reynoso MD  OB/GYN Resident, PGY-3  11/16/2018     The patient was seen and examined with the resident, the labs and vital signs were reviewed.The discharge care plan outlined above was provided under my directives and direct supervision.    Kerline Benitez MD, MPH  Gynecology Oncology

## 2018-11-13 NOTE — BRIEF OP NOTE
GYNECOLOGIC ONCOLOGY BRIEF OPERATION NOTE    PATIENT: Manda Santacruz  MRN: [id number]  DATE OF SURGERY: 11/13/18                  PREOPERATIVE DIAGNOSES:   1. Stage IVB uterine serous carcinoma  2. Status post 6 cycles of neoadjuvant chemotherapy  3. History of jejunal perforation with primary repair     POSTOPERATIVE DIAGNOSES:   1. Stage IVB uterine serous carcinoma  2. Status post 6 cycles of neoadjuvant chemotherapy  3. History of jejunal perforation with primary repair     OPERATIVE PROCEDURES:   1. Exploratory laparotomy  2. Lysis of adhesions for 45 minutes  3. Modified-radical hysterectomy  4. Bilateral salpingo-oophorectomy  5. Bilateral uretorolysis  6. Bilateral pelvic and para-aortic lymph node dissection  7. Omentectomy  8. Liver mobilization  9. Tumor debulking to no gross residual disease     SURGEON: Prabhjot Rm MD      ASSISTANTS:   1. Ranjeet Elam MD, 3rd year fellow  2. Angy Reynoso MD, 3rd year resident     ANESTHESIA: General     ESTIMATED BLOOD LOSS: 300 mL     TOTAL INTRAVENOUS FLUIDS: crystalloid 1800 mL, albumin 500 mL      TOTAL URINE OUTPUT: 450 mL, clear urine     TRANSFUSIONS: none     DRAIN: corona cathether      SPECIMENS REMOVED: Uterus, cervix, parametria, bilateral tubes and ovaries, bilateral pelvic and para-aortic lymph nodes, bilateral gonadal vessels, omentum, ileal mesentery nodules, jejunal mesentery nodules     OPERATIVE FINDINGS:   On exam under anesthesia, large, smooth mass prolapsing through the cervix into the vagina. The mass and uterus was mobile with no parametrial involvement appreciated. On laparotomy, there were small amount of adhesions from previous surgery for jejunal perforation and repair that were taken down (~ 45 min). The small bowel and sigmoid adhesions to the uterus were lysed. The diaphragm, liver, stomach, omentum, spleen, and peritoneal side wall were all smooth and without evidence of disease. The cecum, appendix, and small bowel  was then inspected and found to be normal. We did identify 4 small, <1 mm nodules on the mesentery of ileum and jejunum that were resected. We identified the portion of jejunum that was perforated and repaired primarily at [facility] earlier during treatment. This portion of bowel was healthy appearing. The uterus was enlarged, globular, and stuck to the bladder. Bilateral tubes and ovaries appeared normal. The lymph nodes in bilateral pelvis were enlarged. At end of case, there was no gross residual disease identified.     COMPLICATIONS: None noted.      CONDITION: Stable on transfer.     Ranjeet Elam MD  Gynecologic Oncology Fellow  11/13/2018 2:06 PM

## 2018-11-13 NOTE — IP AVS SNAPSHOT
[address] [address]    Phone:  [phone]                                       After Visit Summary   11/13/2018    Manda Santacruz    MRN: [id number]           After Visit Summary Signature Page     I have received my discharge instructions, and my questions have been answered. I have discussed any challenges I see with this plan with the nurse or doctor.    ..........................................................................................................................................  Patient/Patient Representative Signature      ..........................................................................................................................................  Patient Representative Print Name and Relationship to Patient    ..................................................               ................................................  Date                                   Time    ..........................................................................................................................................  Reviewed by Signature/Title    ...................................................              ..............................................  Date                                               Time          22EPIC Rev 08/18

## 2018-11-13 NOTE — ANESTHESIA CARE TRANSFER NOTE
Patient: Manda Santacruz    Procedure(s):  Exploratory Laparotomy, Lysis of adhesions x 45minutes, Total Abdominal Radical Hysterectomy, Bilateral Salpingo-Oophorectomy, Tumor Debulking, Omentectomy, Bilateral Pelvic and Para Aortic Lymph Node Dissection, Bilateral Ureterolysis, Liver mobilization    Diagnosis: Endometrial Cancer   Diagnosis Additional Information: No value filed.    Anesthesia Type:   General     Note:  Airway :Face Mask  Patient transferred to:PACU  Handoff Report: Identifed the Patient, Identified the Reponsible Provider, Reviewed the pertinent medical history, Discussed the surgical course, Reviewed Intra-OP anesthesia mangement and issues during anesthesia, Set expectations for post-procedure period and Allowed opportunity for questions and acknowledgement of understanding      Vitals: (Last set prior to Anesthesia Care Transfer)    CRNA VITALS  11/13/2018 1357 - 11/13/2018 1435      11/13/2018             Pulse: 96    SpO2: 98 %                Electronically Signed By: Charles Patrick Schlatter, APRN CRNA  November 13, 2018  2:35 PM

## 2018-11-13 NOTE — OP NOTE
GYNECOLOGIC ONCOLOGY OPERATION NOTE    PATIENT: Manda Santacruz  MRN: [id number]  DATE OF SURGERY: 11/13/18    PREOPERATIVE DIAGNOSES:    1. Stage IVB uterine serous carcinoma  2. Status post 6 cycles of neoadjuvant chemotherapy  3. History of jejunal perforation with primary repair    POSTOPERATIVE DIAGNOSES:    1. Stage IVB uterine serous carcinoma  2. Status post 6 cycles of neoadjuvant chemotherapy  3. History of jejunal perforation with primary repair    OPERATIVE PROCEDURES:    1. Exploratory laparotomy  2. Lysis of adhesions for 45 minutes  3. Modified-radical hysterectomy  4. Bilateral salpingo-oophorectomy  5. Bilateral uretorolysis  6. Bilateral pelvic and para-aortic lymph node dissection  7. Omentectomy  8. Liver mobilization  9. Tumor debulking to no gross residual disease    SURGEON: Prabhjot Rm MD     ASSISTANTS:   1. Ranjeet Elam MD, 3rd year fellow  2. Angy Ryenoso MD, 3rd year resident    ANESTHESIA: General endotracheal.     ESTIMATED BLOOD LOSS: 300 mL    TOTAL INTRAVENOUS FLUIDS: crystalloid 1800 mL, 500 mL of albumin     TOTAL URINE OUTPUT: 450 mL, clear urine    TRANSFUSIONS: none    DRAIN: corona cathether     SPECIMENS REMOVED: Uterus, cervix, parametria, bilateral tubes and ovaries, bilateral pelvic and para-aortic lymph nodes, bilateral gonadal vessels, omentum, ileal mesentery nodules, jejunal mesentery nodules    OPERATIVE FINDINGS:    On exam under anesthesia, large, smooth mass prolapsing through the cervix into the vagina. The mass and uterus was mobile with no parametrial involvement appreciated. On laparotomy, there were small amount of adhesions from previous surgery for jejunal perforation and repair that were taken down (~ 45 min). The small bowel and sigmoid adhesions to the uterus were lysed. The diaphragm, liver, stomach, omentum, spleen, and peritoneal side wall were all smooth and without evidence of disease. The cecum, appendix, and small bowel was then  inspected and found to be normal. We did identify 4 small, <1 mm nodules on the mesentery of ileum and jejunum that were resected. We identified the portion of jejunum that was perforated and repaired primarily at [facility] earlier during treatment. This portion of bowel was healthy appearing. The uterus was enlarged, globular, and stuck to the bladder. Bilateral tubes and ovaries appeared normal. The lymph nodes in bilateral pelvis were enlarged. At end of case, there was no gross residual disease identified.    COMPLICATIONS: None noted.     CONDITION: Extubated, stable on transfer.     INDICATIONS FOR PROCEDURE: This patient is a 58 year old with known stage IVB uterine serous carcinoma who is status post 6 cycles of neoadjuvant chemotherapy. During chemotherapy, she was identified to have a jejunal perforation that was repaired primarily over at [facility]. She had a good response to chemotherapy. Treatment options were reviewed and the patient ultimately consented to the above procedure.    OPERATIVE PROCEDURE IN DETAIL: The consent was reviewed with the patient in the preoperative setting and confirmed. She received antibiotic prophylaxis and had sequential compression devices applied to her calves. She was transferred to the operating room and placed on the operating room table in the supine position. General anesthesia was administered in the usual manner. An oralgastric tube was placed for gastric decompression. Metcalf catheter was placed under sterile techniques. The patient was then prepped and draped in the standard fashion. A timeout was called at which point the patient's name, operative procedure and site was confirmed by the operative team.     The scalpel was used to incise the skin from the pubic symphysis to ~ 4 cm superior to the umbilicus. This skin incision was carried through the subcutaneous layer with cautery. Fascia and peritoneum were incised with the scalpel; these incisions were extended superiorly  and inferiorly with the cautery. Exploration of the pelvis and upper abdomen was performed with the findings described above. We first lysed adhesions from the omentum and small bowel to the anterior abdominal wall and the uterus. Once free, pelvic washings were then obtained.     We then explored the upper abdomen by palpation and did not identify any disease on bilateral liver edges, diaphragm, stomach, and omentum. We then evaluated the entire small bowel and colon and found just minimal, small, < 1 mm nodules on the mesentery. We decided to start with the pelvis. We set up the Bookwalter retractor and safely packed the bowel away into the upper abdomen.    We began the procedure on the right side. The right round ligament was grasped with Kocher, transected with cautery and the retroperitoneum opened parallel to the infundibulopelvic ligament on the right. We then identified the ureter at the pelvic brim. We then made a window on the medial leaf of the peritoneum and doubly clamped, cut, and suture ligated the infundibulopelvic ligament. We then elevated the right adnexa and dissected to the uterus. While on the right, we then took down the anterior leaf of the broad ligament and initiated the bladder flap by incising the vesicouterine peritoneum. We then went to the left side. In a similar manner, the left round ligament was grasped with Kocher, transected with cautery and the retroperitoneum opened parallel to the infundibulopelvic ligament on the left. We then identified the ureter at the pelvic brim. We then made a window on the medial leaf of the peritoneum and doubly clamped, cut, and suture ligated the infundibulopelvic ligament. We then elevated the left adnexa and dissected to the uterus. We then took down the anterior leaf of the broad ligament and continued the the bladder flap, connecting it with the previous incision. We then continued with the bladder flap, pushing the bladder well below the  cervix.    At this point, we then performed bilateral ureterolysis. The ureters were isolated away from the medial peritoneal leaf with surrounding adventitia. We then entered the paravesical and pararectal spaces, bilaterally. We then identified the uterine artery as it originated off the internal iliac artery. We ligated bilateral uterine arteries at their origin. We then used traction on the ureter and tunneled underneath the parametria, rolling both the uterine artery and the associated parametrial tissue with it over the ureter. We then pushed through the remaining tissue with right angles and doubly clamped the parametrial tissue, cut, and suture ligated. We then used the Ligasure device to further free up the tissue.     We then entered the rectovaginal space and dissected the rectum down. We then came across the uterosacral ligaments, ~ 2 cm from the cervix, with Ligasure device. We then started on the colpotomy, by incising the vagina with cautery. We then continued the colpotomy around with combination of Ligasure and cautery. Specimen was amputated and sent to pathology. The vaginal cuff was then cleaned with betadine. The vaginal cuff was then closed with running 0-vicryl suture, first pass with imbricating stitch and then running back through the knuckles. Hemostasis was excellent. We then irrigated the pelvis and hemostasis assured.     At this point, we then performed bilateral pelvic and para-aortic lymph node dissection. The lymph node dissection was performed first on the right and then on the left side. The borders of the right pelvic lymph node dissection included laterally the genitofemoral nerve, medially the ureter, cephalad the bifurcation of the common iliac artery and caudad the deep circumflex iliac vein. Lymph nodes overlying the vessels were removed. Lymph nodes within the obturator space were removed above the level of the obturator nerve. We repeated the pelvic lymph node dissection  along the left with similar borders. Lymph nodes overlying the vessels as well as within the obturator space above the level of the obturator nerve were next removed without injury to the vasculature or nerve structures. Hemostasis was assured in the beds of both lymph node dissections.     We then positioned the Bookwalter retractor. We then mobilized the ascending and descending colon along the white line of Toldt. Attention was then turned to the omentectomy. The Bovie cautery was used to isolate multiple vascular pedicles to permit removing the omentum from the hepatic to the splenic flexure as well as to detach the attachments of the omentum from the antimesenteric aspect of the transverse colon. The Impact Ligasure device was used to coagulate the isolated vascular pedicles and then the omentum was sent to pathology. The omentum appeared without disease. Of note, when mobilizing the colon and lysing more small bowel adhesions, we did identify the previous repair of the jejunal perforation. The small bowel appeared normal with omentum attached. We decided not to resect the portion of the small bowel because it appeared normal.     We then turned to further mobilization of the liver. We resected the falciform ligament and sent it to pathology. We then continued with mobilization of the liver. Once mobilized, we did not identify any diaphragmatic or liver disease.     At this point, attention was then turned to the para-aortic lymph node dissection. The Bookwalter retractor was again set up and bowel packed away. We began the procedure on the right side. The right gonadal vessel was grasped with a [place] and then further isolated using the Bovie cautery; it was then clamped, transected, and doubly ligated at its origin from the aorta. The aorta and IVC were then carefully inspected, and the renal vessels on the right side were palpated. The right para-aortic lymph node dissection was performed from the  bifurcation of the common iliac artery along the course of the aorta and lateral to the psoas muscle. This was performed to the level of the renal vessels. The lymph nodes overlying the aorta and IVC were carefully dissected off these vessels; both the Bovie cautery and clips were utilized to maintain hemostasis. Of note, we did creat a small, 1 mm defect in the IVC that was repaired with 5-0 proline suture. Excellent hemostasis was present. The same maneuvers were then performed for the para-aortic lymph node dissection on the left side. The left gonadal vessel was grasped with a [place] and then further isolated using the Bovie cautery; it was then clamped, transected, and doubly ligated at its origin from the aorta. The renal vessels on the left side were palpated. The left para-aortic lymph node dissection was performed from the bifurcation of the common iliac artery along the course of the aorta and lateral to the psoas muscle. This was performed to the level of the renal vessels. Again, both the Bovie cautery and clips were utilized to maintain hemostasis.     We then evaluated the small bowel and colon again, carefully looking for disease. We resected the small, 1 mm nodules on the mesentery and sent then to pathology. At the end of the case, there was no evidence of residual disease.    At this point, all pedicles were inspected and adequate hemostasis was noted. We irrigated the pelvis with 3 liters of warm saline. Excellent hemostasis was present. We then closed the fascia with running looped 0-PDS suture, meeting in the middle. We then closed subcutaneous tissue with 2-0 vicryl suture. We then closed skin with 3-0 monocryl suture. The patient tolerated the procedure well. Sponge, lap, and needle counts were correct x2. She was extubated and taken to PACU in stable condition.     Prabhjot Rm MD, MS    Department of Obstetrics and Gynecology   Division of Gynecologic Oncology    [facility]  Phone: [phone]

## 2018-11-14 LAB
ANION GAP SERPL CALCULATED.3IONS-SCNC: 11 MMOL/L (ref 3–14)
BUN SERPL-MCNC: 18 MG/DL (ref 7–30)
CALCIUM SERPL-MCNC: 9.1 MG/DL (ref 8.5–10.1)
CHLORIDE SERPL-SCNC: 104 MMOL/L (ref 94–109)
CO2 SERPL-SCNC: 24 MMOL/L (ref 20–32)
CREAT SERPL-MCNC: 0.87 MG/DL (ref 0.52–1.04)
ERYTHROCYTE [DISTWIDTH] IN BLOOD BY AUTOMATED COUNT: 16.3 % (ref 10–15)
GFR SERPL CREATININE-BSD FRML MDRD: 67 ML/MIN/1.7M2
GLUCOSE BLDC GLUCOMTR-MCNC: 122 MG/DL (ref 70–99)
GLUCOSE BLDC GLUCOMTR-MCNC: 140 MG/DL (ref 70–99)
GLUCOSE BLDC GLUCOMTR-MCNC: 93 MG/DL (ref 70–99)
GLUCOSE SERPL-MCNC: 139 MG/DL (ref 70–99)
HCT VFR BLD AUTO: 31.6 % (ref 35–47)
HGB BLD-MCNC: 10 G/DL (ref 11.7–15.7)
MCH RBC QN AUTO: 29.9 PG (ref 26.5–33)
MCHC RBC AUTO-ENTMCNC: 31.6 G/DL (ref 31.5–36.5)
MCV RBC AUTO: 94 FL (ref 78–100)
PLATELET # BLD AUTO: 151 10E9/L (ref 150–450)
POTASSIUM SERPL-SCNC: 4.2 MMOL/L (ref 3.4–5.3)
RBC # BLD AUTO: 3.35 10E12/L (ref 3.8–5.2)
SODIUM SERPL-SCNC: 139 MMOL/L (ref 133–144)
WBC # BLD AUTO: 9.2 10E9/L (ref 4–11)

## 2018-11-14 PROCEDURE — 80048 BASIC METABOLIC PNL TOTAL CA: CPT | Performed by: OBSTETRICS & GYNECOLOGY

## 2018-11-14 PROCEDURE — 25000128 H RX IP 250 OP 636: Performed by: STUDENT IN AN ORGANIZED HEALTH CARE EDUCATION/TRAINING PROGRAM

## 2018-11-14 PROCEDURE — 12000008 ZZH R&B INTERMEDIATE UMMC

## 2018-11-14 PROCEDURE — 36415 COLL VENOUS BLD VENIPUNCTURE: CPT | Performed by: OBSTETRICS & GYNECOLOGY

## 2018-11-14 PROCEDURE — 25000131 ZZH RX MED GY IP 250 OP 636 PS 637: Performed by: STUDENT IN AN ORGANIZED HEALTH CARE EDUCATION/TRAINING PROGRAM

## 2018-11-14 PROCEDURE — 99024 POSTOP FOLLOW-UP VISIT: CPT | Performed by: OBSTETRICS & GYNECOLOGY

## 2018-11-14 PROCEDURE — 00000146 ZZHCL STATISTIC GLUCOSE BY METER IP

## 2018-11-14 PROCEDURE — 25000132 ZZH RX MED GY IP 250 OP 250 PS 637: Performed by: STUDENT IN AN ORGANIZED HEALTH CARE EDUCATION/TRAINING PROGRAM

## 2018-11-14 PROCEDURE — 85027 COMPLETE CBC AUTOMATED: CPT | Performed by: OBSTETRICS & GYNECOLOGY

## 2018-11-14 RX ADMIN — ACETAMINOPHEN 650 MG: 325 TABLET, FILM COATED ORAL at 04:37

## 2018-11-14 RX ADMIN — ENOXAPARIN SODIUM 40 MG: 40 INJECTION SUBCUTANEOUS at 08:14

## 2018-11-14 RX ADMIN — SODIUM CHLORIDE, POTASSIUM CHLORIDE, SODIUM LACTATE AND CALCIUM CHLORIDE: 600; 310; 30; 20 INJECTION, SOLUTION INTRAVENOUS at 22:33

## 2018-11-14 RX ADMIN — ACETAMINOPHEN 650 MG: 325 TABLET, FILM COATED ORAL at 16:19

## 2018-11-14 RX ADMIN — OXYCODONE HYDROCHLORIDE 5 MG: 5 TABLET ORAL at 06:45

## 2018-11-14 RX ADMIN — ONDANSETRON 4 MG: 4 TABLET, ORALLY DISINTEGRATING ORAL at 08:14

## 2018-11-14 RX ADMIN — IBUPROFEN 600 MG: 600 TABLET ORAL at 22:29

## 2018-11-14 RX ADMIN — OXYCODONE HYDROCHLORIDE 5 MG: 5 TABLET ORAL at 20:37

## 2018-11-14 RX ADMIN — ACETAMINOPHEN 650 MG: 325 TABLET, FILM COATED ORAL at 22:29

## 2018-11-14 RX ADMIN — OXYCODONE HYDROCHLORIDE 5 MG: 5 TABLET ORAL at 16:19

## 2018-11-14 RX ADMIN — SODIUM CHLORIDE, POTASSIUM CHLORIDE, SODIUM LACTATE AND CALCIUM CHLORIDE 250 ML: 600; 310; 30; 20 INJECTION, SOLUTION INTRAVENOUS at 05:07

## 2018-11-14 RX ADMIN — ACETAMINOPHEN 650 MG: 325 TABLET, FILM COATED ORAL at 10:26

## 2018-11-14 RX ADMIN — SENNOSIDES AND DOCUSATE SODIUM 1 TABLET: 8.6; 5 TABLET ORAL at 08:13

## 2018-11-14 RX ADMIN — IBUPROFEN 600 MG: 600 TABLET ORAL at 10:26

## 2018-11-14 RX ADMIN — SODIUM CHLORIDE, POTASSIUM CHLORIDE, SODIUM LACTATE AND CALCIUM CHLORIDE: 600; 310; 30; 20 INJECTION, SOLUTION INTRAVENOUS at 14:40

## 2018-11-14 RX ADMIN — SODIUM CHLORIDE, POTASSIUM CHLORIDE, SODIUM LACTATE AND CALCIUM CHLORIDE: 600; 310; 30; 20 INJECTION, SOLUTION INTRAVENOUS at 06:45

## 2018-11-14 RX ADMIN — INSULIN ASPART 1 UNITS: 100 INJECTION, SOLUTION INTRAVENOUS; SUBCUTANEOUS at 12:30

## 2018-11-14 RX ADMIN — IBUPROFEN 600 MG: 600 TABLET ORAL at 04:37

## 2018-11-14 RX ADMIN — OXYCODONE HYDROCHLORIDE 5 MG: 5 TABLET ORAL at 10:28

## 2018-11-14 RX ADMIN — ONDANSETRON 4 MG: 4 TABLET, ORALLY DISINTEGRATING ORAL at 00:12

## 2018-11-14 RX ADMIN — MAGNESIUM HYDROXIDE 15 ML: 400 SUSPENSION ORAL at 08:13

## 2018-11-14 RX ADMIN — IBUPROFEN 600 MG: 600 TABLET ORAL at 16:19

## 2018-11-14 RX ADMIN — SENNOSIDES AND DOCUSATE SODIUM 2 TABLET: 8.6; 5 TABLET ORAL at 19:00

## 2018-11-14 ASSESSMENT — ACTIVITIES OF DAILY LIVING (ADL)
ADLS_ACUITY_SCORE: 9

## 2018-11-14 ASSESSMENT — PAIN DESCRIPTION - DESCRIPTORS
DESCRIPTORS: ACHING
DESCRIPTORS: ACHING

## 2018-11-14 NOTE — PLAN OF CARE
Problem: Patient Care Overview  Goal: Plan of Care/Patient Progress Review  Outcome: Therapy, progress toward functional goals is gradual  POD #1 laparotomy, hysterectomy and tumor debulking due to uterine carcinoma. Hx bowel perf. RPIV infusing IVMF. Abd inc with marked drainage on primapore. Abdominal binder in place. Metcalf producing adequate urine volumes. Orders state to remove at 1800 this evening. Pt up with SBA. Walked the halls multiple times this shift and tolerated well. Oxycodone for pain as well as scheduled tylenol and ibuprofen. Pt calls for oxy if needed. Denies nausea. BG checks AC/HS with sliding scale coverage. Tolerating regular diet, likes to take medications with applesauce. Capno can be discontinued at 1600. No gas or BM yet. Bedside report: no.

## 2018-11-14 NOTE — PLAN OF CARE
"Problem: Patient Care Overview  Goal: Plan of Care/Patient Progress Review  Outcome: No Change  /71 (BP Location: Right arm)  Temp 95  F (35  C) (Oral)  Resp 22  Ht 1.626 m (5' 4\")  Wt 85.9 kg (189 lb 6 oz)  SpO2 100%  BMI 32.51 kg/m2  Shift: 1489-9736  Status: POD 0 for laparotomy + hysterectomy + tumor debulking d/t uterine carcinoma  VS: VSS on 3L NC  Neuros: A/Ox4, able to make needs known.  GI: Regular Diet, no BMs this shift.  : Corona with adequate urine output  IV: PIV infusing LR @ 125 mL/hr  Activity: Dangled this shift  Pain/Nausea: Denies nausea, c/o of back pain gave IV dilaudid x1 and Oxycodone (5mg) x1  Respiratory: Encouraged IS use q1h  Skin: Midline incision covered with dressing- extension marked.  Social: Family was visiting   Plan of care: Possible corona removal tomorrow. Will continue to monitor and follow POC.        "

## 2018-11-14 NOTE — PROGRESS NOTES
"Gynecology Oncology Progress Note      Manda Santacruz is a 58 year old with stage IVB uterine serous carcinoma POD#1 s/p exploratory laparotomy, modified radical hysterectomy, bilateral salpingo-oophorectomy, bilateral ureterolysis, bilateral pelvic and para-aortic lymph node dissection, omentectomy, liver mobilization and tumor debulking to no gross residual disease.    24 Hour Events:  - low urine output, s/p 250cc bolud  - required 2U sliding scale insulin overnight    S: Pt doing well with pain well controlled this morning. Has not needed oxycodone since last night. Denies any nausea, shortness of breath and chest pain. Stood at her bedside and walked in place last night. Tolerating water and applesauce without any N/V, hasn't eaten anything more. Corona still in place, but is passing flatus    O:    /68 (BP Location: Right arm)  Temp 95.4  F (35.2  C) (Oral)  Resp 22  Ht 1.626 m (5' 4\")  Wt 85.9 kg (189 lb 6 oz)  SpO2 97%  BMI 32.51 kg/m2; 2 L NC    I/O last 3 completed shifts:  In: 2500 [I.V.:2000]  Out: 1025 [Urine:425; Blood:600]      yesterday // 200cc since 2100 (0.38cc/kg/hr)      Recent Labs  Lab 11/13/18  2155 11/13/18 2006 11/13/18  0555 11/09/18  1257   GLC  --   --   --  102*   * 195* 114*  --     Required 2U sliding scale insulin overnight    General:  A&Ox3, NAD  CV:  Regular rate and rhythm, no murmurs  Pulm:  CTAB, good inspiratory effort. NC in place 2L  Abd: soft, appropriately tender to palpation, nondistended.  No rebound or guarding  Incision: vertical midline incision covered with dressing with small amount of serosanguinous drainage.  Lines: R hand peripheral IV, corona  LE: no edema, no calf tenderness    Labs  CBC, BMP pending    A:  58 year old F, POD#1 s/p exploratory laparotomy with modified-radical hysterectomy, bilateral salpingo-oophorectomy, bilateral ureterolysis, bilateral pelvic and para-aortic lymph node dissection, omentectomy, liver mobilization " and tumor debulking to no gross residual disease. She is doing well POD#1.    P:      Dz: Stage IVB uterine serous carcinoma s/p 6 cycles of neoadjuvant chemotherapy c/b jejunal perforation s/p primary repair at [facility] in June 2018. Now s/p above procedure. Final pathology pending. Plan to follow up with Dr. Rm in clinic on 12/5 for future treatment plan.  FEN: Regular diet, IVF LR @ 125 ml/hr, w/p 250cc IVF bolus for low UOP. Can decrease IVF once tolerating more PO.  Pain: s/p TAP block. Tylenol, ibuprofen, oxycodone, IV dilaudid prn.  Heme: Hgb 11.7 (11/9)>   CV: HTN, previously on medications but discontinued losartan-hydrochlorothiazide after weight loss. Takes it prn.  Pulm: No issues.  GI: clears, ADAT. Bowel regimen, zofran PRN for nausea.  : corona in place. Plan removal POD#3.  ID: Afebrile.   Endo: DM (A1c 5.9 11/9), no meds; no issues  Psych/Neuro: arthritis; no issues  PPX: SCDs, PPI, IS, lovenox in AM if Hgb stable  Dispo: Discharge home when meeting postoperative milestones. Discussed corona out, ambulation today    Candelaria Rutledge MD  Gynecology Oncology, PGY-2  November 14, 2018 , 5:53 AM   Pager [phone]       Provider Disclosure:   I agree with above History, Review of Systems, Physical exam and Plan. I have reviewed the content of the documentation and have edited it as needed. I have personally performed the services documented here and the documentation accurately represents those services and the decisions I have made.     Electronically signed by:   Moriah Segura MD   Gynecologic Oncology   [facility]

## 2018-11-14 NOTE — PLAN OF CARE
"Problem: Patient Care Overview  Goal: Plan of Care/Patient Progress Review  Outcome: Improving    Status: POD 0 for laparotomy + hysterectomy + tumor debulking d/t uterine carcinoma    VS: AVSS, O2 sat 93% 2.5L NC   Neuro: A&Ox4, lethargic   Resp: WNL  Cardiac: WNL  GI: - passing flatus, bowel sounds hypoactive in all quadrants   : Metcalf catheter patent, Adequate teresa UOP (~35cc/hr)  Skin: WNL  Diet: Advance as tolerated, no appetite overnight   VAD: Right PIV infusing LR @ 125. Bolus of 250cc given @ 0500  Wounds: abd midline incision UTV, dressing intact- drainage marked and unchanged overnight   Activity: Adjust activity as tolerated, dangled evening shift of 11/13/18. Up with assist x2 until steady   Pain: Denied pain overnight, given 5mg oxycodone @ 0645 per pt request to \"stay ahead of the pain\"     Plan of Care: Needs encouragement to increase activity and diet as tolerated. Increase oral hydration as tolerated. Increase IS use and pulmonary hygiene     Donna Pope RN on 11/14/2018 at 4:00 AM           "

## 2018-11-15 LAB — GLUCOSE BLDC GLUCOMTR-MCNC: 123 MG/DL (ref 70–99)

## 2018-11-15 PROCEDURE — 25000132 ZZH RX MED GY IP 250 OP 250 PS 637: Performed by: STUDENT IN AN ORGANIZED HEALTH CARE EDUCATION/TRAINING PROGRAM

## 2018-11-15 PROCEDURE — 12000008 ZZH R&B INTERMEDIATE UMMC

## 2018-11-15 PROCEDURE — 25000132 ZZH RX MED GY IP 250 OP 250 PS 637: Performed by: NURSE PRACTITIONER

## 2018-11-15 PROCEDURE — 00000146 ZZHCL STATISTIC GLUCOSE BY METER IP

## 2018-11-15 PROCEDURE — 99024 POSTOP FOLLOW-UP VISIT: CPT | Performed by: OBSTETRICS & GYNECOLOGY

## 2018-11-15 PROCEDURE — 25000128 H RX IP 250 OP 636: Performed by: STUDENT IN AN ORGANIZED HEALTH CARE EDUCATION/TRAINING PROGRAM

## 2018-11-15 RX ORDER — OXYCODONE HYDROCHLORIDE 5 MG/1
5-10 TABLET ORAL EVERY 4 HOURS PRN
Status: DISCONTINUED | OUTPATIENT
Start: 2018-11-15 | End: 2018-11-16 | Stop reason: HOSPADM

## 2018-11-15 RX ADMIN — OXYCODONE HYDROCHLORIDE 10 MG: 5 TABLET ORAL at 19:53

## 2018-11-15 RX ADMIN — OXYCODONE HYDROCHLORIDE 5 MG: 5 TABLET ORAL at 15:34

## 2018-11-15 RX ADMIN — OXYCODONE HYDROCHLORIDE 5 MG: 5 TABLET ORAL at 05:57

## 2018-11-15 RX ADMIN — IBUPROFEN 600 MG: 600 TABLET ORAL at 04:14

## 2018-11-15 RX ADMIN — IBUPROFEN 600 MG: 600 TABLET ORAL at 10:11

## 2018-11-15 RX ADMIN — OXYCODONE HYDROCHLORIDE 5 MG: 5 TABLET ORAL at 01:10

## 2018-11-15 RX ADMIN — OXYCODONE HYDROCHLORIDE 5 MG: 5 TABLET ORAL at 10:11

## 2018-11-15 RX ADMIN — ENOXAPARIN SODIUM 40 MG: 40 INJECTION SUBCUTANEOUS at 09:14

## 2018-11-15 RX ADMIN — BISACODYL 10 MG: 10 SUPPOSITORY RECTAL at 09:23

## 2018-11-15 RX ADMIN — SENNOSIDES AND DOCUSATE SODIUM 2 TABLET: 8.6; 5 TABLET ORAL at 08:42

## 2018-11-15 RX ADMIN — SENNOSIDES AND DOCUSATE SODIUM 2 TABLET: 8.6; 5 TABLET ORAL at 19:53

## 2018-11-15 RX ADMIN — ACETAMINOPHEN 650 MG: 325 TABLET, FILM COATED ORAL at 04:14

## 2018-11-15 RX ADMIN — ACETAMINOPHEN 650 MG: 325 TABLET, FILM COATED ORAL at 10:11

## 2018-11-15 ASSESSMENT — ACTIVITIES OF DAILY LIVING (ADL)
ADLS_ACUITY_SCORE: 9

## 2018-11-15 ASSESSMENT — PAIN DESCRIPTION - DESCRIPTORS
DESCRIPTORS: ACHING
DESCRIPTORS: ACHING
DESCRIPTORS: ACHING;SORE
DESCRIPTORS: ACHING

## 2018-11-15 NOTE — PLAN OF CARE
Problem: Patient Care Overview  Goal: Plan of Care/Patient Progress Review  Outcome: No Change    Status: POD#2 s/p exploratory laparotomy, modified radical hysterectomy, bilateral salpingo-oophorectomy, bilateral ureterolysis, bilateral pelvic and para-aortic lymph node dissection, omentectomy, liver mobilization and tumor debulking to no gross residual disease. (Per MD note)      VS: Temp: 97.5  F (36.4  C) Temp src: Oral BP: 107/65 Pulse: 78   Resp: 20 SpO2: 94 % (after using IS/coughing) O2 Device: None (Room air)   Resp: Lung sounds clear and equal bilaterally, IS and pulmonary hygiene w/ encouragement   Cardiac: WNL  GI: - passing flatus, BS hypoactive. Denies N/V  : WNL, voided last at 2200  Skin: WNL  Diet: Tolerating regular diet, did not eat overnight   VAD: Right PIV infusing   Wounds: abd incision with original dressing CDI (old/dry drainage). Abd binder in place   Activity: Up with SBA x1. Up to chair @ 0500   Pain: Managed with PRN 5mg PO oxycodone and scheduled pain meds     Plan of Care: Continue w/ current POC. Increase activity and oral hydration    Donna Pope RN on 11/15/2018 at 5:14 AM

## 2018-11-15 NOTE — PLAN OF CARE
"Problem: Patient Care Overview  Goal: Plan of Care/Patient Progress Review  Outcome: No Change    /48 (BP Location: Right arm)  Pulse 84  Temp 97.8  F (36.6  C) (Oral)  Resp 21  Ht 1.626 m (5' 4\")  Wt 86.4 kg (190 lb 6.4 oz)  SpO2 91%  BMI 32.68 kg/m2    Time: 5466-0331.  Reason for admission: POD #1 laparotomy, hysterectomy, tumor debulking for uterine CA.   VS: VSS on RA with O2 sats in mid 90s. Afebrile.   Activity: Up with assist x1, steady on feet. Calls appropriately.   Neuros: A&Ox4. Neuros intact. C/o abdominal pain managed with PRN PO Oxycodone.   Cardiac: WDL. HR stable in 80s. Denies chest pain.   Respiratory: WDL. LS clear. Denies SOB. IS encouraged.   GI/: Metcalf removed at 1900, due to void. No BM on this shift, +BS, -gas. Denies nausea or vomiting.   Diet: Regular diet, tolerating well.   Skin: Midline abdominal incision covered with primapore, marked, no change. Abdominal binder in place.   Lines: Right PIV infusing LR at 125 mL/hr.   Labs: WDL. Bg stable at 122.   New changes this shift: No new changes this shift.   Plan: Continue to manage pain & encourage OOB activity.   Will continue to monitor & follow POC.    ADDENDUM @ 1037: Bedtime BG stable at 93. Pt voided 200 mL yellow urine.        11/14/18 2220   OTHER   Plan Of Care Reviewed With patient   Plan of Care Review   Progress no change       Problem: Surgery Nonspecified (Adult)  Goal: Signs and Symptoms of Listed Potential Problems Will be Absent, Minimized or Managed (Surgery Nonspecified)  Signs and symptoms of listed potential problems will be absent, minimized or managed by discharge/transition of care (reference Surgery Nonspecified (Adult) CPG).   Outcome: No Change   11/14/18 2220   Surgery Nonspecified   Problems Assessed (Surgery) all   Problems Present (Surgery) pain;situational response       "

## 2018-11-15 NOTE — PROGRESS NOTES
"Gynecology Oncology Progress Note  11/15/2018      Manda Santacruz is a 58 year old with stage IVB uterine serous carcinoma POD#2 s/p exploratory laparotomy, modified radical hysterectomy, bilateral salpingo-oophorectomy, bilateral ureterolysis, bilateral pelvic and para-aortic lymph node dissection, omentectomy, liver mobilization and tumor debulking to no gross residual disease.    24 Hour Events:  - s/p corona, voiding spontaneously  -  > 75cc/h    S: Pt doing well this morning, got up and now sitting in the chair. Ambulated yesterday in the [place], plans to walk farther today. Corona was removed and voiding spontaneously, not yet passing flatus. Ate some yesterday but not a huge appetite. Pain is well-controlled with Oxy    O:    /65 (BP Location: Right arm)  Pulse 78  Temp 97.5  F (36.4  C) (Oral)  Resp 20  Ht 1.626 m (5' 4\")  Wt 86.4 kg (190 lb 6.4 oz)  SpO2 94%  BMI 32.68 kg/m2;     I/O last 3 completed shifts:  In: 3847.08 [P.O.:720; I.V.:2877.08; IV Piggyback:250]  Out: 1050 [Urine:1050]     UOP 1305 yesterday // 200cc since [place]    General:  A&Ox3, NAD, Sitting up in the chair this morning  CV:  Regular rate and rhythm, no murmurs  Pulm:  CTAB, good inspiratory effort.   Abd: soft, appropriately tender to palpation, nondistended.  No rebound or guarding  Incision: vertical midline incision covered with dressing with small amount of serosanguinous drainage. Unchanged from yesterday  Lines: R hand peripheral IV  LE: no edema, no calf tenderness    Labs    Recent Labs  Lab 11/15/18  0157 11/14/18  2228 11/14/18  1721 11/14/18  1227 11/14/18  0602 11/13/18  2155 11/13/18 2006 11/09/18  1257   GLC  --   --   --   --  139*  --   --   --  102*   * 93 122* 140*  --  160* 195*  < >  --    < > = values in this interval not displayed.     Required 1U sliding scale insulin in past 24 hours    Hgb: 11.7 > 10.0  No new labs this AM    A:  58 year old F, POD#2 s/p exploratory laparotomy with " modified-radical hysterectomy, bilateral salpingo-oophorectomy, bilateral ureterolysis, bilateral pelvic and para-aortic lymph node dissection, omentectomy, liver mobilization and tumor debulking to no gross residual disease. She is doing well in postoperative period    P:      Dz: Stage IVB uterine serous carcinoma s/p 6 cycles of neoadjuvant chemotherapy c/b jejunal perforation s/p primary repair at [facility] in June 2018. Now s/p above procedure. Final pathology pending. Plan to follow up with Dr. Rm in clinic on 12/5 for future treatment plan.  FEN: Regular diet, IVF LR @ 75 ml/hr, Poor PO intake yesterday, will discontinue IVF today with increased PO intake  Pain: s/p TAP block. Tylenol, ibuprofen, oxycodone, IV dilaudid prn.  Heme: Hgb with appropriate drop after surgery.   CV: HTN, previously on medications but discontinued losartan-hydrochlorothiazide after weight loss. Takes it prn. No issues with BP   Pulm: No issues.  GI: clears, ADAT. Bowel regimen, zofran PRN for nausea.  : s/p corona, voiding spontaneously. Adequate UOP overnight  ID: Afebrile.   Endo: DM (A1c 5.9 11/9), no meds; MSSI and QID blood checks, can discontinue checks today  Psych/Neuro: arthritis; no issues  PPX: SCDs, PPI, IS, lovenox   Dispo: Discharge home when meeting postoperative milestones.     Candelaria Rutledge MD  Gynecology Oncology, PGY-2  November 15, 2018 , 5:47 AM   Pager [phone]     Provider Disclosure:   I agree with above History, Review of Systems, Physical exam and Plan. I have reviewed the content of the documentation and have edited it as needed. I have seen and personally performed the services documented here and the documentation accurately represents those services and the decisions I have made.     Electronically signed by:   Prabhjot Rm MD   Gynecologic Oncology   [facility]

## 2018-11-15 NOTE — PLAN OF CARE
Problem: Patient Care Overview  Goal: Plan of Care/Patient Progress Review  Outcome: Improving  VSS on room air. Pain controlled with tylenol, ibuprofen, and oxycodone. Tolerating a regular diet. Denies nausea. Voiding spontaneously with adequate urine output. Passing gas but no BM yet. Midline abdominal incision CDI. Ambulating in halls with stand-by assist.

## 2018-11-16 VITALS
WEIGHT: 190.4 LBS | BODY MASS INDEX: 32.5 KG/M2 | RESPIRATION RATE: 16 BRPM | OXYGEN SATURATION: 97 % | TEMPERATURE: 97.6 F | DIASTOLIC BLOOD PRESSURE: 80 MMHG | HEIGHT: 64 IN | SYSTOLIC BLOOD PRESSURE: 148 MMHG | HEART RATE: 80 BPM

## 2018-11-16 LAB
ANION GAP SERPL CALCULATED.3IONS-SCNC: 8 MMOL/L (ref 3–14)
BUN SERPL-MCNC: 12 MG/DL (ref 7–30)
CALCIUM SERPL-MCNC: 8.8 MG/DL (ref 8.5–10.1)
CHLORIDE SERPL-SCNC: 106 MMOL/L (ref 94–109)
CO2 SERPL-SCNC: 26 MMOL/L (ref 20–32)
CREAT SERPL-MCNC: 0.67 MG/DL (ref 0.52–1.04)
ERYTHROCYTE [DISTWIDTH] IN BLOOD BY AUTOMATED COUNT: 16.4 % (ref 10–15)
GFR SERPL CREATININE-BSD FRML MDRD: >90 ML/MIN/1.7M2
GLUCOSE SERPL-MCNC: 104 MG/DL (ref 70–99)
HCT VFR BLD AUTO: 29.7 % (ref 35–47)
HGB BLD-MCNC: 9.3 G/DL (ref 11.7–15.7)
MCH RBC QN AUTO: 30.3 PG (ref 26.5–33)
MCHC RBC AUTO-ENTMCNC: 31.3 G/DL (ref 31.5–36.5)
MCV RBC AUTO: 97 FL (ref 78–100)
PLATELET # BLD AUTO: 135 10E9/L (ref 150–450)
POTASSIUM SERPL-SCNC: 3.7 MMOL/L (ref 3.4–5.3)
RBC # BLD AUTO: 3.07 10E12/L (ref 3.8–5.2)
SODIUM SERPL-SCNC: 140 MMOL/L (ref 133–144)
WBC # BLD AUTO: 5.8 10E9/L (ref 4–11)

## 2018-11-16 PROCEDURE — 85027 COMPLETE CBC AUTOMATED: CPT | Performed by: STUDENT IN AN ORGANIZED HEALTH CARE EDUCATION/TRAINING PROGRAM

## 2018-11-16 PROCEDURE — 99024 POSTOP FOLLOW-UP VISIT: CPT | Performed by: OBSTETRICS & GYNECOLOGY

## 2018-11-16 PROCEDURE — 25000128 H RX IP 250 OP 636: Performed by: STUDENT IN AN ORGANIZED HEALTH CARE EDUCATION/TRAINING PROGRAM

## 2018-11-16 PROCEDURE — 25000132 ZZH RX MED GY IP 250 OP 250 PS 637: Performed by: STUDENT IN AN ORGANIZED HEALTH CARE EDUCATION/TRAINING PROGRAM

## 2018-11-16 PROCEDURE — 80048 BASIC METABOLIC PNL TOTAL CA: CPT | Performed by: STUDENT IN AN ORGANIZED HEALTH CARE EDUCATION/TRAINING PROGRAM

## 2018-11-16 PROCEDURE — 25000132 ZZH RX MED GY IP 250 OP 250 PS 637: Performed by: NURSE PRACTITIONER

## 2018-11-16 PROCEDURE — 36415 COLL VENOUS BLD VENIPUNCTURE: CPT | Performed by: STUDENT IN AN ORGANIZED HEALTH CARE EDUCATION/TRAINING PROGRAM

## 2018-11-16 RX ORDER — ACETAMINOPHEN 325 MG/1
650 TABLET ORAL EVERY 6 HOURS PRN
Qty: 60 TABLET | Refills: 0 | Status: SHIPPED | OUTPATIENT
Start: 2018-11-16 | End: 2018-11-16

## 2018-11-16 RX ORDER — OXYCODONE HYDROCHLORIDE 5 MG/1
5-10 TABLET ORAL EVERY 6 HOURS PRN
Qty: 20 TABLET | Refills: 0 | Status: SHIPPED | OUTPATIENT
Start: 2018-11-16 | End: 2019-08-28

## 2018-11-16 RX ORDER — AMOXICILLIN 250 MG
2 CAPSULE ORAL 2 TIMES DAILY
Qty: 100 TABLET | Refills: 0 | Status: SHIPPED | OUTPATIENT
Start: 2018-11-16 | End: 2018-11-16

## 2018-11-16 RX ORDER — IBUPROFEN 600 MG/1
600 TABLET, FILM COATED ORAL EVERY 6 HOURS
Qty: 60 TABLET | Refills: 0 | Status: SHIPPED | OUTPATIENT
Start: 2018-11-16 | End: 2019-08-28

## 2018-11-16 RX ORDER — IBUPROFEN 600 MG/1
600 TABLET, FILM COATED ORAL EVERY 6 HOURS
Qty: 60 TABLET | Refills: 0 | Status: SHIPPED | OUTPATIENT
Start: 2018-11-16 | End: 2018-11-16

## 2018-11-16 RX ORDER — OXYCODONE HYDROCHLORIDE 5 MG/1
5-10 TABLET ORAL EVERY 6 HOURS PRN
Qty: 20 TABLET | Refills: 0 | Status: SHIPPED | OUTPATIENT
Start: 2018-11-16 | End: 2018-11-16

## 2018-11-16 RX ORDER — ACETAMINOPHEN 325 MG/1
650 TABLET ORAL EVERY 6 HOURS PRN
Qty: 60 TABLET | Refills: 0 | Status: SHIPPED | OUTPATIENT
Start: 2018-11-16 | End: 2019-08-28

## 2018-11-16 RX ORDER — AMOXICILLIN 250 MG
2 CAPSULE ORAL 2 TIMES DAILY
Qty: 100 TABLET | Refills: 0 | Status: SHIPPED | OUTPATIENT
Start: 2018-11-16

## 2018-11-16 RX ADMIN — OXYCODONE HYDROCHLORIDE 5 MG: 5 TABLET ORAL at 00:39

## 2018-11-16 RX ADMIN — OXYCODONE HYDROCHLORIDE 5 MG: 5 TABLET ORAL at 10:02

## 2018-11-16 RX ADMIN — ENOXAPARIN SODIUM 40 MG: 40 INJECTION SUBCUTANEOUS at 08:56

## 2018-11-16 RX ADMIN — OXYCODONE HYDROCHLORIDE 5 MG: 5 TABLET ORAL at 12:39

## 2018-11-16 RX ADMIN — SENNOSIDES AND DOCUSATE SODIUM 2 TABLET: 8.6; 5 TABLET ORAL at 08:56

## 2018-11-16 ASSESSMENT — ACTIVITIES OF DAILY LIVING (ADL)
ADLS_ACUITY_SCORE: 9

## 2018-11-16 ASSESSMENT — PAIN DESCRIPTION - DESCRIPTORS
DESCRIPTORS: ACHING;SORE
DESCRIPTORS: ACHING
DESCRIPTORS: ACHING;SORE

## 2018-11-16 NOTE — PLAN OF CARE
"Problem: Patient Care Overview  Goal: Plan of Care/Patient Progress Review  Outcome: Improving    Status: POD#3 s/p exploratory laparotomy, modified radical hysterectomy, bilateral salpingo-oophorectomy, bilateral ureterolysis, bilateral pelvic and para-aortic lymph node dissection, omentectomy, liver mobilization and tumor debulking to no gross residual disease. (Per MD note)     VS: AVSS on RA  Neuro: WNL  Resp: WNL  Cardiac: WNL  GI: +passing flatus. + bowel sounds. No stools overnight   : WNL  Skin: WNL  Diet: Tolerating regular diet. Denies N/V. Fair appetite  VAD: None-- PIV removed due to infiltration. MD aware, advised that a new PIV is not necessary at this time.   Wounds: Abd midline incision covered with ABD pad- no new drainage. Incision is WNL   Activity: Up independently- steady gait   Pain: Managed w/ PO Oxycodone PRN. Administered 5mg one time overnight. Pt refused scheduled Tylenol and Ibuprofen-- states \"doesn't do anything for her pain\"  Pt concerns: Would like to discharge today if possible as to be home to [place] by end of day Saturday     Plan of Care: Continue w/ current POC, encourage oral intake of food, monitor for BM    Donna Pope RN on 11/16/2018 at 6:04 AM          "

## 2018-11-16 NOTE — PLAN OF CARE
Problem: Patient Care Overview  Goal: Plan of Care/Patient Progress Review  Outcome: Improving  POD 2. AVSS on RA. Pt is taking prn oxycodone for abd pain management. Pt declined sched tylenol and sched advil, pt stated neither of these meds help her pain management. Abd incision w/ steri stips. Abd pads on top of incision w/ abd binder in place. Pt ambulated in hallway ind w/ sister. Voiding spont. Passing gas, waiting for BM. Regular diet, tolerated about 50% of dinner. Denies nausea. PIV SL. Continue w/ POC.

## 2018-11-16 NOTE — PROGRESS NOTES
"Gynecology Oncology Progress Note  11/16/2018      Manda Santacruz is a 58 year old with stage IVB uterine serous carcinoma POD#3 s/p exploratory laparotomy, modified radical hysterectomy, bilateral salpingo-oophorectomy, bilateral ureterolysis, bilateral pelvic and para-aortic lymph node dissection, omentectomy, liver mobilization and tumor debulking to no gross residual disease.    24 Hour Events:  - IV infiltrated and removed. No new access overnight    S: Yana is doing well this morning, slept ok overnight. Minimal pain. Yesterday walked x3 and sat in a chair x3. She is eating ok, but food is cold. Voiding and ambulating. Is passing flatus, has not yet had a bowel movement. Would like to go home today  O:    /80 (BP Location: Left arm)  Pulse 80  Temp 97.6  F (36.4  C) (Oral)  Resp 16  Ht 1.626 m (5' 4\")  Wt 86.4 kg (190 lb 6.4 oz)  SpO2 97%  BMI 32.68 kg/m2;     I/O last 3 completed shifts:  In: 298.75 [P.O.:120; I.V.:178.75]  Out: 550 [Urine:550]     UOP 750cc yesterday // -- cc since [place] (not charted)    General:  A&Ox3, NAD, Sitting up in the chair this morning  CV:  Regular rate and rhythm, no murmurs  Pulm:  nonlabored breathing  Abd: soft, appropriately tender to palpation, nondistended.  No rebound or guarding  Incision: vertical midline incision with steri strips in place. Clean and intact  LE: no edema, no calf tenderness    Labs  Hgb: 11.7 > 10.0  AM CBC/BMP pending    A:  58 year old F, POD#3 s/p exploratory laparotomy with modified-radical hysterectomy, bilateral salpingo-oophorectomy, bilateral ureterolysis, bilateral pelvic and para-aortic lymph node dissection, omentectomy, liver mobilization and tumor debulking to no gross residual disease. She is doing well in postoperative period, meeting all postoperative goals    P:      Dz: Stage IVB uterine serous carcinoma s/p 6 cycles of neoadjuvant chemotherapy c/b jejunal perforation s/p primary repair at [facility] in June 2018. " Now s/p above procedure. Final pathology pending. Plan to follow up with Dr. Rm in clinic on 12/5 for future treatment plan.  FEN: Regular diet  Pain: s/p TAP block. Tylenol, ibuprofen, oxycodone  Heme: Hgb with appropriate drop after surgery.   CV: HTN, previously on medications but discontinued losartan-hydrochlorothiazide after weight loss. Takes it prn. No issues with BP   Pulm: No issues.  GI: clears, ADAT. Bowel regimen, zofran PRN for nausea.  : s/p corona, voiding spontaneously. Adequate UOP overnight  ID: Afebrile.   Endo: DM (A1c 5.9 11/9), QID checks and MSSI initially postop. Discontinued yesterday  Psych/Neuro: arthritis; no issues  PPX: SCDs, PPI, IS, lovenox x28 d  Dispo: Discharge home when meeting postoperative milestones, likely today    Candelaria Rutledge MD  Gynecology Oncology, PGY-2  November 16, 2018 , 6:27 AM     Pager [phone]     The patient was seen and examined with the resident, the labs and vital signs were reviewed.The medical care outlined above was provided under my directives and direct supervision.    Kerline Benitez MD, MPH  Gynecologic Oncology

## 2018-11-16 NOTE — PROGRESS NOTES
Addended by: Kailee Mosqueda on: 5/23/2018 04:56 PM     Modules accepted: Alexis Discharge Note  Lines/drains: PIV was removed overnight  Medications: Went over discharge medications with pt. Pt demonstrated lovenox teaching.   Teaching: Went over discharge paperwork and lovenox teaching. Verbalized understanding.  Transportation: Pt left via wheelchair with daughter and transportation company that her daughter had set up for the pt.

## 2018-11-19 ENCOUNTER — CARE COORDINATION (OUTPATIENT)
Dept: ONCOLOGY | Facility: CLINIC | Age: 58
End: 2018-11-19

## 2018-11-19 NOTE — PROGRESS NOTES
Post-Discharge Phone Call:    Pain:  1) Location: Abdominal   2) Rate pain: Tolerable   3) Is your pain well controlled on your pain medication?: Yes  4) How often are you taking your pain medication?: Only as needed     GI:  5) Last bowel movement: Passing gas   6) Are you having regular bowel movements? No  7) Eating/drinking well?: Yes  8) Nausea?: No    Urinary:  9) Are you having problems or difficulty with urination? No      Lower Extremities:  10) Were lymph nodes removed during surgery?  N/A  11) If yes, have you been offered a lymphedema consult appointment?  N/A  12) Any pain, redness, or swelling in legs?  No  13) Any area on your legs that are warm to touch? No  14) Chest pain or severe shortness of breath? No    Wound:  15) Type of incision: Abdominal   Any of the following:  - Drainage (color, amount): No  - Odor: No  - Redness: No  - Chills: No  - Fever: No  16) Staples - Have you had your staples out yet? (staples should be removed 7-10 days post-op): N/A  17) Pt was reminded to wash incision (allow warm, soapy water to run over incision): Yes    Post-op:  18) Verify date and time of appointment: yes  19) Pt was informed that pathology will be discussed at this appointment Yes    Any other questions or concerns at this time? No     Lakisha Castillo RN

## 2018-11-29 LAB — COPATH REPORT: NORMAL

## 2018-12-05 ENCOUNTER — OFFICE VISIT (OUTPATIENT)
Dept: ONCOLOGY | Facility: CLINIC | Age: 58
End: 2018-12-05
Attending: OBSTETRICS & GYNECOLOGY
Payer: COMMERCIAL

## 2018-12-05 VITALS
HEART RATE: 85 BPM | TEMPERATURE: 98.1 F | OXYGEN SATURATION: 100 % | DIASTOLIC BLOOD PRESSURE: 86 MMHG | WEIGHT: 175.5 LBS | SYSTOLIC BLOOD PRESSURE: 137 MMHG | BODY MASS INDEX: 30.12 KG/M2

## 2018-12-05 DIAGNOSIS — C54.1 ENDOMETRIAL CANCER (H): Primary | ICD-10-CM

## 2018-12-05 PROCEDURE — 99214 OFFICE O/P EST MOD 30 MIN: CPT | Mod: 24 | Performed by: OBSTETRICS & GYNECOLOGY

## 2018-12-05 PROCEDURE — G0463 HOSPITAL OUTPT CLINIC VISIT: HCPCS | Mod: ZF

## 2018-12-05 ASSESSMENT — PAIN SCALES - GENERAL: PAINLEVEL: NO PAIN (0)

## 2018-12-05 NOTE — PROGRESS NOTES
Follow Up Notes on Referred Patient    Date: 2018     Dr. Neeru Segura MD  No address on file     RE: Manda Santacruz  : 1960  CULLEN: 2018    Dear Dr. Neeru Segura:    Manda Santacruz is a 58 year old woman with a diagnosis of  Stage IVB uterine serous carcinoma, status post 6 cycles of neoadjuvant chemotherapy and now post op from Exploratory laparotomy, MR-hysterectomy, BSO, bilateral PPALND, omentectomy to R0 on 18.   She is here today for follow-up from post-op and review of pathology report.     She has been recovering well from surgery. Pain well controlled. Not really taking any more pain meds and is back to driving. Denies vaginal bleeding. Back to eating and drinking without difficulty. Normal bowel movements. No difficulty voiding. Denies much pain or numbness from the chemotherapy in the past.      Plans on getting chemotherapy in [place], [place].    Past Medical History:  Past Medical History:   Diagnosis Date     Perforated bowel (H)      Uterine cancer (H)      Past Surgical History:  Past Surgical History:   Procedure Laterality Date     HYSTERECTOMY RADICAL, SALPINGO-OOPHORECTOMY, NODE DISSECTION, TUMOR DEBULKING N/A 2018    Procedure: Exploratory Laparotomy, Lysis of adhesions x 45minutes, Total Abdominal Radical Hysterectomy, Bilateral Salpingo-Oophorectomy, Tumor Debulking, Omentectomy, Bilateral Pelvic and Para Aortic Lymph Node Dissection, Bilateral Ureterolysis, Liver mobilization;  Surgeon: Prabhjot Rm MD;  Location: [facility] OR     LAPAROTOMY EXPLORATORY         Health Maintenance Due   Topic Date Due     TETANUS IMMUNIZATION (SYSTEM ASSIGNED)  1979     HIV SCREEN (SYSTEM ASSIGNED)  1979     HEPATITIS C SCREENING  1979     PAP SCREENING Q3 YR (SYSTEM ASSIGNED)  1982     LIPID SCREEN Q5 YR FEMALE (SYSTEM ASSIGNED)  2006     MAMMO SCREEN Q2 YR (SYSTEM ASSIGNED)  2011     COLON CANCER SCREEN (SYSTEM ASSIGNED)   07/25/2011     INFLUENZA VACCINE (1) 09/01/2018       Current Medications:   Current Outpatient Prescriptions   Medication Sig Dispense Refill     acetaminophen (TYLENOL) 325 MG tablet Take 2 tablets (650 mg) by mouth every 6 hours as needed for mild pain 60 tablet 0     aspirin 81 MG chewable tablet Take 81 mg by mouth daily (with lunch) ON HOLD FOR SURGERY SINCE 11/07/2018       cefdinir (OMNICEF) 300 MG capsule Take 300 mg by mouth 2 times daily       enoxaparin (LOVENOX) 40 MG/0.4ML injection Inject 0.4 mLs (40 mg) Subcutaneous every 24 hours 10 mL 0     ibuprofen (ADVIL/MOTRIN) 600 MG tablet Take 1 tablet (600 mg) by mouth every 6 hours 60 tablet 0     oxyCODONE IR (ROXICODONE) 5 MG tablet Take 1-2 tablets (5-10 mg) by mouth every 6 hours as needed 20 tablet 0     senna (SENOKOT) 8.6 MG tablet Take 1 tablet by mouth every morning        senna-docusate (SENOKOT-S;PERICOLACE) 8.6-50 MG per tablet Take 2 tablets by mouth 2 times daily 100 tablet 0     losartan-hydrochlorothiazide (HYZAAR) 100-12.5 MG per tablet Take 1 tablet by mouth as needed ONLY TAKES WHEN HER BLOOD PRESSURE IS HIGH       ondansetron (ZOFRAN-ODT) 4 MG ODT tab DISSOLVE ONE TABLET BY MOUTH EVERY 4 HOURS AS NEEDED FOR NAUSEA         Allergies:    No Known Allergies     Social History:  Social History   Substance Use Topics     Smoking status: Never Smoker     Smokeless tobacco: Never Used     Alcohol use Not on file       History   Drug Use Not on file         Physical Exam:   /86  Pulse 85  Temp 98.1  F (36.7  C) (Oral)  Wt 79.6 kg (175 lb 8 oz)  SpO2 100%  BMI 30.12 kg/m2  Body mass index is 30.12 kg/(m^2).    General Appearance: healthy and alert, no distress    Musculoskeletal: extremities non tender and without edema    Neurological: normal gait, no gross defects     Psychiatric: appropriate mood and affect            Abdomen: soft, NT, ND, well healing midline incision                      Genitourinary: Normal external genitalia,  well healing vaginal cuff intact      Assessment:  Manda Santacruz is a 58 year old woman with a diagnosis of Stage IVB uterine serous carcinoma.     A total of 25 minutes was spent with the patient, 20 minutes of which were spent in counseling the patient and/or treatment planning.      1. Stage IVB uterine serous carcinoma  2. S/p Neoadjuvant chemotherapy  3. S/p R0 surgical resection    Discussed with the patient to consolidate with 3 cycles of chemotherapy, Carboplatin AUC of 6 and Taxol of 175mg/m2 for q 3 weeks. We will see her back after that to discuss surveillance.  She agrees with the plan all questions were answered.    Prabhjot Rm MD, MS    Department of Obstetrics and Gynecology   Division of Gynecologic Oncology   [facility]  Phone: [phone]        CC  Patient Care Team:  Adebayo Muro as PCP - General (Family Practice)  SELF, REFERRED

## 2018-12-05 NOTE — NURSING NOTE
"Oncology Rooming Note    December 5, 2018 10:13 AM   Manda Santacruz is a 58 year old female who presents for:    Chief Complaint   Patient presents with     Oncology Clinic Visit     Post op;      Initial Vitals: /86  Pulse 85  Temp 98.1  F (36.7  C) (Oral)  Wt 79.6 kg (175 lb 8 oz)  SpO2 100%  BMI 30.12 kg/m2 Estimated body mass index is 30.12 kg/(m^2) as calculated from the following:    Height as of 11/13/18: 1.626 m (5' 4\").    Weight as of this encounter: 79.6 kg (175 lb 8 oz). Body surface area is 1.9 meters squared.  No Pain (0) Comment: Data Unavailable   No LMP recorded. Patient has had a hysterectomy.  Allergies reviewed: Yes  Medications reviewed: Yes    Medications: Medication refills not needed today.  Pharmacy name entered into EPIC: Data Unavailable    Clinical concerns:      8 minutes for nursing intake (face to face time)     Juliet Adrian CMA              "

## 2018-12-05 NOTE — LETTER
2018       RE: Manda Santacruz  [address] [place] [address]     Dear Colleague,    Thank you for referring your patient, Manda Santacruz, to the [facility]. Please see a copy of my visit note below.                Follow Up Notes on Referred Patient    Date: 2018     Dr. Neeru Segura MD  No address on file     RE: Manda Santacruz  : 1960  CULLEN: 2018    Dear Dr. Neeru Segura:    Manda Santacruz is a 58 year old woman with a diagnosis of  Stage IVB uterine serous carcinoma, status post 6 cycles of neoadjuvant chemotherapy and now post op from Exploratory laparotomy, MR-hysterectomy, BSO, bilateral PPALND, omentectomy to R0 on 18.   She is here today for follow-up from post-op and review of pathology report.     She has been recovering well from surgery. Pain well controlled. Not really taking any more pain meds and is back to driving. Denies vaginal bleeding. Back to eating and drinking without difficulty. Normal bowel movements. No difficulty voiding. Denies much pain or numbness from the chemotherapy in the past.      Plans on getting chemotherapy in [place], [place].    Past Medical History:  Past Medical History:   Diagnosis Date     Perforated bowel (H)      Uterine cancer (H)      Past Surgical History:  Past Surgical History:   Procedure Laterality Date     HYSTERECTOMY RADICAL, SALPINGO-OOPHORECTOMY, NODE DISSECTION, TUMOR DEBULKING N/A 2018    Procedure: Exploratory Laparotomy, Lysis of adhesions x 45minutes, Total Abdominal Radical Hysterectomy, Bilateral Salpingo-Oophorectomy, Tumor Debulking, Omentectomy, Bilateral Pelvic and Para Aortic Lymph Node Dissection, Bilateral Ureterolysis, Liver mobilization;  Surgeon: Prabhjot Rm MD;  Location: [facility] OR     LAPAROTOMY EXPLORATORY         Health Maintenance Due   Topic Date Due     TETANUS IMMUNIZATION (SYSTEM ASSIGNED)  1979     HIV SCREEN (SYSTEM ASSIGNED)  1979      HEPATITIS C SCREENING  07/25/1979     PAP SCREENING Q3 YR (SYSTEM ASSIGNED)  07/25/1982     LIPID SCREEN Q5 YR FEMALE (SYSTEM ASSIGNED)  07/25/2006     MAMMO SCREEN Q2 YR (SYSTEM ASSIGNED)  07/25/2011     COLON CANCER SCREEN (SYSTEM ASSIGNED)  07/25/2011     INFLUENZA VACCINE (1) 09/01/2018       Current Medications:   Current Outpatient Prescriptions   Medication Sig Dispense Refill     acetaminophen (TYLENOL) 325 MG tablet Take 2 tablets (650 mg) by mouth every 6 hours as needed for mild pain 60 tablet 0     aspirin 81 MG chewable tablet Take 81 mg by mouth daily (with lunch) ON HOLD FOR SURGERY SINCE 11/07/2018       cefdinir (OMNICEF) 300 MG capsule Take 300 mg by mouth 2 times daily       enoxaparin (LOVENOX) 40 MG/0.4ML injection Inject 0.4 mLs (40 mg) Subcutaneous every 24 hours 10 mL 0     ibuprofen (ADVIL/MOTRIN) 600 MG tablet Take 1 tablet (600 mg) by mouth every 6 hours 60 tablet 0     oxyCODONE IR (ROXICODONE) 5 MG tablet Take 1-2 tablets (5-10 mg) by mouth every 6 hours as needed 20 tablet 0     senna (SENOKOT) 8.6 MG tablet Take 1 tablet by mouth every morning        senna-docusate (SENOKOT-S;PERICOLACE) 8.6-50 MG per tablet Take 2 tablets by mouth 2 times daily 100 tablet 0     losartan-hydrochlorothiazide (HYZAAR) 100-12.5 MG per tablet Take 1 tablet by mouth as needed ONLY TAKES WHEN HER BLOOD PRESSURE IS HIGH       ondansetron (ZOFRAN-ODT) 4 MG ODT tab DISSOLVE ONE TABLET BY MOUTH EVERY 4 HOURS AS NEEDED FOR NAUSEA         Allergies:    No Known Allergies     Social History:  Social History   Substance Use Topics     Smoking status: Never Smoker     Smokeless tobacco: Never Used     Alcohol use Not on file       History   Drug Use Not on file         Physical Exam:   /86  Pulse 85  Temp 98.1  F (36.7  C) (Oral)  Wt 79.6 kg (175 lb 8 oz)  SpO2 100%  BMI 30.12 kg/m2  Body mass index is 30.12 kg/(m^2).    General Appearance: healthy and alert, no distress    Musculoskeletal: extremities non  tender and without edema    Neurological: normal gait, no gross defects     Psychiatric: appropriate mood and affect            Abdomen: soft, NT, ND, well healing midline incision                      Genitourinary: Normal external genitalia, well healing vaginal cuff intact      Assessment:  Manda Santacruz is a 58 year old woman with a diagnosis of Stage IVB uterine serous carcinoma.     A total of 25 minutes was spent with the patient, 20 minutes of which were spent in counseling the patient and/or treatment planning.      1. Stage IVB uterine serous carcinoma  2. S/p Neoadjuvant chemotherapy  3. S/p R0 surgical resection    Discussed with the patient to consolidate with 3 cycles of chemotherapy, Carboplatin AUC of 6 and Taxol of 175mg/m2 for q 3 weeks. We will see her back after that to discuss surveillance.  She agrees with the plan all questions were answered.    Prabhjot Rm MD, MS    Department of Obstetrics and Gynecology   Division of Gynecologic Oncology   [facility]  Phone: [phone]    CC  Patient Care Team:  Adebayo Muro as PCP - General (Family Practice)

## 2019-05-01 ENCOUNTER — ONCOLOGY VISIT (OUTPATIENT)
Dept: ONCOLOGY | Facility: CLINIC | Age: 59
End: 2019-05-01
Attending: OBSTETRICS & GYNECOLOGY
Payer: COMMERCIAL

## 2019-05-01 VITALS
WEIGHT: 195.8 LBS | TEMPERATURE: 97.6 F | RESPIRATION RATE: 18 BRPM | SYSTOLIC BLOOD PRESSURE: 140 MMHG | HEART RATE: 77 BPM | HEIGHT: 63 IN | DIASTOLIC BLOOD PRESSURE: 89 MMHG | BODY MASS INDEX: 34.69 KG/M2 | OXYGEN SATURATION: 100 %

## 2019-05-01 DIAGNOSIS — C54.1 ENDOMETRIAL CANCER (H): Primary | ICD-10-CM

## 2019-05-01 PROCEDURE — 99214 OFFICE O/P EST MOD 30 MIN: CPT | Mod: ZP | Performed by: OBSTETRICS & GYNECOLOGY

## 2019-05-01 PROCEDURE — G0463 HOSPITAL OUTPT CLINIC VISIT: HCPCS

## 2019-05-01 ASSESSMENT — MIFFLIN-ST. JEOR: SCORE: 1445.88

## 2019-05-01 ASSESSMENT — PAIN SCALES - GENERAL: PAINLEVEL: NO PAIN (0)

## 2019-05-01 NOTE — NURSING NOTE
"Oncology Rooming Note    May 1, 2019 7:08 AM   Manda Santacruz is a 58 year old female who presents for:    Chief Complaint   Patient presents with     Oncology Clinic Visit     Patient with Endometrial CA here for provider visit      Initial Vitals: /89 (BP Location: Left arm, Patient Position: Chair, Cuff Size: Adult Regular)   Pulse 77   Temp 97.6  F (36.4  C) (Oral)   Resp 18   Ht 1.606 m (5' 3.23\")   Wt 88.8 kg (195 lb 12.8 oz)   SpO2 100%   BMI 34.43 kg/m   Estimated body mass index is 34.43 kg/m  as calculated from the following:    Height as of this encounter: 1.606 m (5' 3.23\").    Weight as of this encounter: 88.8 kg (195 lb 12.8 oz). Body surface area is 1.99 meters squared.  No Pain (0) Comment: Data Unavailable   No LMP recorded. Patient has had a hysterectomy.  Allergies reviewed: Yes  Medications reviewed: Yes    Medications:   Pharmacy name entered into EPIC: Data Unavailable    Clinical concerns:       Gini Tena CMA              "

## 2019-05-01 NOTE — LETTER
2019       RE: Manda Santacruz  [address] [facility] [address]     Dear Colleague,    Thank you for referring your patient, Manda Santacruz, to the [facility]. Please see a copy of my visit note below.                Follow Up Notes on Referred Patient    Date: 2019       Dr. Neeru Segura MD  No address on file       RE: Manda Santacruz  : 1960  CULLEN: 2019    Dear Dr. Neeru Segura:    Manda Santacruz is a 58 year old woman with a diagnosis of Stage IVB uterine serous carcinoma.     The patient presents today for followup.  She has been doing well.  She finished her adjuvant chemotherapy.  She is eating and drinking well.  No nausea or vomiting, fever or chills.  Normal urinary and bowel function.  No vaginal bleeding or discharge.  No B symptoms.           Past Medical History:    Past Medical History:   Diagnosis Date     Perforated bowel (H)      Uterine cancer (H)          Past Surgical History:    Past Surgical History:   Procedure Laterality Date     HYSTERECTOMY RADICAL, SALPINGO-OOPHORECTOMY, NODE DISSECTION, TUMOR DEBULKING N/A 2018    Procedure: Exploratory Laparotomy, Lysis of adhesions x 45minutes, Total Abdominal Radical Hysterectomy, Bilateral Salpingo-Oophorectomy, Tumor Debulking, Omentectomy, Bilateral Pelvic and Para Aortic Lymph Node Dissection, Bilateral Ureterolysis, Liver mobilization;  Surgeon: Prabhjot Rm MD;  Location: [facility] OR     LAPAROTOMY EXPLORATORY           Health Maintenance Due   Topic Date Due     PREVENTIVE CARE VISIT  1961     HIV SCREEN (SYSTEM ASSIGNED)  1979     HEPATITIS C SCREENING  1979     PAP SCREENING Q3 YR (SYSTEM ASSIGNED)  1982     DTAP/TDAP/TD IMMUNIZATION (1 - Tdap) 1986     MAMMO SCREEN Q2 YR (SYSTEM ASSIGNED)  2001     LIPID SCREEN Q5 YR FEMALE (SYSTEM ASSIGNED)  2006     COLON CANCER SCREEN (SYSTEM ASSIGNED)  2011     ZOSTER IMMUNIZATION (1 of 2)  07/25/2011       Current Medications:     Current Outpatient Medications   Medication Sig Dispense Refill     aspirin 81 MG chewable tablet Take 81 mg by mouth daily (with lunch) ON HOLD FOR SURGERY SINCE 11/07/2018       senna (SENOKOT) 8.6 MG tablet Take 1 tablet by mouth every morning        senna-docusate (SENOKOT-S;PERICOLACE) 8.6-50 MG per tablet Take 2 tablets by mouth 2 times daily 100 tablet 0     acetaminophen (TYLENOL) 325 MG tablet Take 2 tablets (650 mg) by mouth every 6 hours as needed for mild pain (Patient not taking: Reported on 5/1/2019) 60 tablet 0     cefdinir (OMNICEF) 300 MG capsule Take 300 mg by mouth 2 times daily       enoxaparin (LOVENOX) 40 MG/0.4ML injection Inject 0.4 mLs (40 mg) Subcutaneous every 24 hours (Patient not taking: Reported on 5/1/2019) 10 mL 0     ibuprofen (ADVIL/MOTRIN) 600 MG tablet Take 1 tablet (600 mg) by mouth every 6 hours (Patient not taking: Reported on 5/1/2019) 60 tablet 0     losartan-hydrochlorothiazide (HYZAAR) 100-12.5 MG per tablet Take 1 tablet by mouth as needed ONLY TAKES WHEN HER BLOOD PRESSURE IS HIGH       ondansetron (ZOFRAN-ODT) 4 MG ODT tab DISSOLVE ONE TABLET BY MOUTH EVERY 4 HOURS AS NEEDED FOR NAUSEA       oxyCODONE IR (ROXICODONE) 5 MG tablet Take 1-2 tablets (5-10 mg) by mouth every 6 hours as needed (Patient not taking: Reported on 5/1/2019) 20 tablet 0         Allergies:      No Known Allergies     Social History:     Social History     Tobacco Use     Smoking status: Never Smoker     Smokeless tobacco: Never Used   Substance Use Topics     Alcohol use: Not on file       History   Drug Use Not on file         Family History:       Family History   Problem Relation Age of Onset     Cerebrovascular Disease Mother 34     Cervical Cancer No family hx of      Uterine Cancer No family hx of      Breast Cancer No family hx of      Pancreatic Cancer No family hx of      Colon Cancer No family hx of          Physical Exam:     /89 (BP  "Location: Left arm, Patient Position: Chair, Cuff Size: Adult Regular)   Pulse 77   Temp 97.6  F (36.4  C) (Oral)   Resp 18   Ht 1.606 m (5' 3.23\")   Wt 88.8 kg (195 lb 12.8 oz)   SpO2 100%   BMI 34.43 kg/m     Body mass index is 34.43 kg/m .    General Appearance: healthy and alert, no distress     Musculoskeletal: extremities non tender and without edema    Neurological: normal gait, no gross defects     Psychiatric: appropriate mood and affect                               Hematological: normal cervical, supraclavicular and inguinal lymph nodes     ABDOMEN:  Soft, nontender and nondistended.  No organomegaly.  Well-healed midline incision.   PELVIC:  Normal external genitalia.  Normal vaginal mucosa.  Well-healed vaginal cuff.  No adnexal masses or tenderness.  Rectovaginal confirms.           Assessment:    Manda Santacruz is a 58 year old woman with a diagnosis of Stage IVB uterine serous carcinoma.     A total of 25 minutes was spent with the patient, 20 minutes of which were spent in counseling the patient and/or treatment planning.      1.  Stage IVB uterine serous carcinoma.   2.  Status post neoadjuvant chemotherapy.   3.  Stage R0 surgical cytoreduction.   4.  Probable bowel perforation during adjuvant treatment with primary surgical repair.   5.  No evidence of disease.      I discussed with the patient.  We will now continue to follow with surveillance.  We will see her every 3-4 months for the first 2 years, then every 6 months for the next 2 years and then yearly once we get to year 5.  She agrees with this plan.  She is very appreciative of her care.  All questions were answered.       Prabhjot Rm MD, MS    Department of Obstetrics and Gynecology   Division of Gynecologic Oncology   [facility]  Phone: [phone]        CC  Patient Care Team:  Adebayo Muro as PCP - General (Family Practice)  SELF, REFERRED          "

## 2019-05-02 NOTE — PROGRESS NOTES
Follow Up Notes on Referred Patient    Date: 2019       Dr. Neeru Segura MD  No address on file       RE: Manda Santacruz  : 1960  CULLEN: 2019    Dear Dr. Neeru Segura:    Manda Santacruz is a 58 year old woman with a diagnosis of Stage IVB uterine serous carcinoma.     The patient presents today for followup.  She has been doing well.  She finished her adjuvant chemotherapy.  She is eating and drinking well.  No nausea or vomiting, fever or chills.  Normal urinary and bowel function.  No vaginal bleeding or discharge.  No B symptoms.           Past Medical History:    Past Medical History:   Diagnosis Date     Perforated bowel (H)      Uterine cancer (H)          Past Surgical History:    Past Surgical History:   Procedure Laterality Date     HYSTERECTOMY RADICAL, SALPINGO-OOPHORECTOMY, NODE DISSECTION, TUMOR DEBULKING N/A 2018    Procedure: Exploratory Laparotomy, Lysis of adhesions x 45minutes, Total Abdominal Radical Hysterectomy, Bilateral Salpingo-Oophorectomy, Tumor Debulking, Omentectomy, Bilateral Pelvic and Para Aortic Lymph Node Dissection, Bilateral Ureterolysis, Liver mobilization;  Surgeon: Prabhjot Rm MD;  Location: [facility] OR     LAPAROTOMY EXPLORATORY           Health Maintenance Due   Topic Date Due     PREVENTIVE CARE VISIT  1961     HIV SCREEN (SYSTEM ASSIGNED)  1979     HEPATITIS C SCREENING  1979     PAP SCREENING Q3 YR (SYSTEM ASSIGNED)  1982     DTAP/TDAP/TD IMMUNIZATION (1 - Tdap) 1986     MAMMO SCREEN Q2 YR (SYSTEM ASSIGNED)  2001     LIPID SCREEN Q5 YR FEMALE (SYSTEM ASSIGNED)  2006     COLON CANCER SCREEN (SYSTEM ASSIGNED)  2011     ZOSTER IMMUNIZATION (1 of 2) 2011       Current Medications:     Current Outpatient Medications   Medication Sig Dispense Refill     aspirin 81 MG chewable tablet Take 81 mg by mouth daily (with lunch) ON HOLD FOR SURGERY SINCE 2018       senna (SENOKOT)  "8.6 MG tablet Take 1 tablet by mouth every morning        senna-docusate (SENOKOT-S;PERICOLACE) 8.6-50 MG per tablet Take 2 tablets by mouth 2 times daily 100 tablet 0     acetaminophen (TYLENOL) 325 MG tablet Take 2 tablets (650 mg) by mouth every 6 hours as needed for mild pain (Patient not taking: Reported on 5/1/2019) 60 tablet 0     cefdinir (OMNICEF) 300 MG capsule Take 300 mg by mouth 2 times daily       enoxaparin (LOVENOX) 40 MG/0.4ML injection Inject 0.4 mLs (40 mg) Subcutaneous every 24 hours (Patient not taking: Reported on 5/1/2019) 10 mL 0     ibuprofen (ADVIL/MOTRIN) 600 MG tablet Take 1 tablet (600 mg) by mouth every 6 hours (Patient not taking: Reported on 5/1/2019) 60 tablet 0     losartan-hydrochlorothiazide (HYZAAR) 100-12.5 MG per tablet Take 1 tablet by mouth as needed ONLY TAKES WHEN HER BLOOD PRESSURE IS HIGH       ondansetron (ZOFRAN-ODT) 4 MG ODT tab DISSOLVE ONE TABLET BY MOUTH EVERY 4 HOURS AS NEEDED FOR NAUSEA       oxyCODONE IR (ROXICODONE) 5 MG tablet Take 1-2 tablets (5-10 mg) by mouth every 6 hours as needed (Patient not taking: Reported on 5/1/2019) 20 tablet 0         Allergies:      No Known Allergies     Social History:     Social History     Tobacco Use     Smoking status: Never Smoker     Smokeless tobacco: Never Used   Substance Use Topics     Alcohol use: Not on file       History   Drug Use Not on file         Family History:       Family History   Problem Relation Age of Onset     Cerebrovascular Disease Mother 34     Cervical Cancer No family hx of      Uterine Cancer No family hx of      Breast Cancer No family hx of      Pancreatic Cancer No family hx of      Colon Cancer No family hx of          Physical Exam:     /89 (BP Location: Left arm, Patient Position: Chair, Cuff Size: Adult Regular)   Pulse 77   Temp 97.6  F (36.4  C) (Oral)   Resp 18   Ht 1.606 m (5' 3.23\")   Wt 88.8 kg (195 lb 12.8 oz)   SpO2 100%   BMI 34.43 kg/m    Body mass index is 34.43 " kg/m .    General Appearance: healthy and alert, no distress     Musculoskeletal: extremities non tender and without edema    Neurological: normal gait, no gross defects     Psychiatric: appropriate mood and affect                               Hematological: normal cervical, supraclavicular and inguinal lymph nodes     ABDOMEN:  Soft, nontender and nondistended.  No organomegaly.  Well-healed midline incision.   PELVIC:  Normal external genitalia.  Normal vaginal mucosa.  Well-healed vaginal cuff.  No adnexal masses or tenderness.  Rectovaginal confirms.           Assessment:    Manda Santacruz is a 58 year old woman with a diagnosis of Stage IVB uterine serous carcinoma.     A total of 25 minutes was spent with the patient, 20 minutes of which were spent in counseling the patient and/or treatment planning.      1.  Stage IVB uterine serous carcinoma.   2.  Status post neoadjuvant chemotherapy.   3.  Stage R0 surgical cytoreduction.   4.  Probable bowel perforation during adjuvant treatment with primary surgical repair.   5.  No evidence of disease.      I discussed with the patient.  We will now continue to follow with surveillance.  We will see her every 3-4 months for the first 2 years, then every 6 months for the next 2 years and then yearly once we get to year 5.  She agrees with this plan.  She is very appreciative of her care.  All questions were answered.       Prabhjot Rm MD, MS    Department of Obstetrics and Gynecology   Division of Gynecologic Oncology   [facility]  Phone: [phone]        CC  Patient Care Team:  Adebayo Muro as PCP - General (Family Practice)  SELF, REFERRED

## 2019-07-24 ENCOUNTER — TELEPHONE (OUTPATIENT)
Dept: ONCOLOGY | Facility: CLINIC | Age: 59
End: 2019-07-24

## 2019-07-24 NOTE — TELEPHONE ENCOUNTER
RN reached out to patient as she had left a voicemail.     Patient states she thinks she has a hernia where her abdominal incision is.     RN and patient discussed management and signs and symptoms of concern.     RN answered all patient questions and concerns to the best of her ability.     Patient will schedule follow up to see and review with MD.     Lakisha Castillo RN

## 2019-08-28 ENCOUNTER — ONCOLOGY VISIT (OUTPATIENT)
Dept: ONCOLOGY | Facility: CLINIC | Age: 59
End: 2019-08-28
Attending: OBSTETRICS & GYNECOLOGY
Payer: COMMERCIAL

## 2019-08-28 VITALS
RESPIRATION RATE: 16 BRPM | HEART RATE: 71 BPM | WEIGHT: 214 LBS | BODY MASS INDEX: 37.64 KG/M2 | OXYGEN SATURATION: 97 % | DIASTOLIC BLOOD PRESSURE: 92 MMHG | SYSTOLIC BLOOD PRESSURE: 144 MMHG | TEMPERATURE: 97.8 F

## 2019-08-28 DIAGNOSIS — C54.1 ENDOMETRIAL CANCER (H): Primary | ICD-10-CM

## 2019-08-28 PROBLEM — E78.00 PURE HYPERCHOLESTEROLEMIA: Status: ACTIVE | Noted: 2018-03-01

## 2019-08-28 PROBLEM — H52.203 MYOPIA OF BOTH EYES WITH ASTIGMATISM AND PRESBYOPIA: Status: ACTIVE | Noted: 2018-02-22

## 2019-08-28 PROBLEM — C55 UTERINE CANCER (H): Status: ACTIVE | Noted: 2018-06-28

## 2019-08-28 PROBLEM — H25.13 NUCLEAR SENILE CATARACT OF BOTH EYES: Status: ACTIVE | Noted: 2018-02-22

## 2019-08-28 PROBLEM — H52.13 MYOPIA OF BOTH EYES WITH ASTIGMATISM AND PRESBYOPIA: Status: ACTIVE | Noted: 2018-02-22

## 2019-08-28 PROBLEM — E11.9 DIABETES MELLITUS TYPE 2 WITHOUT RETINOPATHY (H): Status: ACTIVE | Noted: 2017-10-15

## 2019-08-28 PROBLEM — H52.4 MYOPIA OF BOTH EYES WITH ASTIGMATISM AND PRESBYOPIA: Status: ACTIVE | Noted: 2018-02-22

## 2019-08-28 PROBLEM — I10 HYPERTENSION: Status: ACTIVE | Noted: 2019-08-28

## 2019-08-28 PROBLEM — H35.89 MACULAR RPE MOTTLING: Status: ACTIVE | Noted: 2018-02-22

## 2019-08-28 PROCEDURE — G0463 HOSPITAL OUTPT CLINIC VISIT: HCPCS | Mod: ZF

## 2019-08-28 PROCEDURE — 99214 OFFICE O/P EST MOD 30 MIN: CPT | Mod: ZP | Performed by: OBSTETRICS & GYNECOLOGY

## 2019-08-28 ASSESSMENT — PAIN SCALES - GENERAL: PAINLEVEL: NO PAIN (0)

## 2019-08-28 NOTE — PROGRESS NOTES
Follow Up Notes on Referred Patient    Date: 2019       Dr. Neeru Segura MD  No address on file       RE: Manda Santacruz  : 1960  CULLEN: 2019    Dear Dr. Neeru Segura:    Manda Santacruz is a 58 year old woman with a diagnosis of Stage IVB uterine serous carcinoma.     The patient presents today for followup.  She has been doing well.  She finished her adjuvant chemotherapy.  She is eating and drinking well.  No nausea or vomiting, fever or chills.  Normal urinary and bowel function.  No vaginal bleeding or discharge.  No B symptoms.       Past Medical History:    Past Medical History:   Diagnosis Date     Perforated bowel (H)      Uterine cancer (H)          Past Surgical History:    Past Surgical History:   Procedure Laterality Date     HYSTERECTOMY RADICAL, SALPINGO-OOPHORECTOMY, NODE DISSECTION, TUMOR DEBULKING N/A 2018    Procedure: Exploratory Laparotomy, Lysis of adhesions x 45minutes, Total Abdominal Radical Hysterectomy, Bilateral Salpingo-Oophorectomy, Tumor Debulking, Omentectomy, Bilateral Pelvic and Para Aortic Lymph Node Dissection, Bilateral Ureterolysis, Liver mobilization;  Surgeon: Prabhjot Rm MD;  Location: [facility] OR     LAPAROTOMY EXPLORATORY           Health Maintenance Due   Topic Date Due     PREVENTIVE CARE VISIT  1960     MICROALBUMIN  1961     TSH W/FREE T4 REFLEX  1961     DIABETIC FOOT EXAM  1961     HEPATITIS C SCREENING  1961     EYE EXAM  1961     MAMMO SCREENING  1961     PAP  1961     COLONOSCOPY  1971     HIV SCREENING  1976     ZOSTER IMMUNIZATION (1 of 2) 2011     A1C  2019     LIPID  2019       Current Medications:     Current Outpatient Medications   Medication Sig Dispense Refill     aspirin 81 MG chewable tablet Take 81 mg by mouth daily (with lunch) ON HOLD FOR SURGERY SINCE 2018       senna-docusate (SENOKOT-S;PERICOLACE) 8.6-50 MG per tablet  Take 2 tablets by mouth 2 times daily 100 tablet 0     losartan-hydrochlorothiazide (HYZAAR) 100-12.5 MG per tablet Take 1 tablet by mouth as needed ONLY TAKES WHEN HER BLOOD PRESSURE IS HIGH           Allergies:      No Known Allergies     Social History:     Social History     Tobacco Use     Smoking status: Never Smoker     Smokeless tobacco: Never Used   Substance Use Topics     Alcohol use: Not on file       History   Drug Use Not on file         Family History:       Family History   Problem Relation Age of Onset     Cerebrovascular Disease Mother 34     Cervical Cancer No family hx of      Uterine Cancer No family hx of      Breast Cancer No family hx of      Pancreatic Cancer No family hx of      Colon Cancer No family hx of          Physical Exam:     BP (!) 144/92   Pulse 71   Temp 97.8  F (36.6  C) (Oral)   Resp 16   Wt 97.1 kg (214 lb)   SpO2 97%   BMI 37.64 kg/m    Body mass index is 37.64 kg/m .    General Appearance:  healthy and alert, no distress     Musculoskeletal:         extremities non tender and without edema     Neurological:   normal gait, no gross defects                Psychiatric:      appropriate mood and affect                               Hematological:            normal cervical, supraclavicular and inguinal lymph nodes                ABDOMEN:  Soft, nontender and nondistended.  No organomegaly.  Well-healed midline incision.   PELVIC:  Normal external genitalia.  Normal vaginal mucosa.  Well-healed vaginal cuff.  No adnexal masses or tenderness.  Rectovaginal confirms.               Assessment:     Manda Santacruz is a 58 year old woman with a diagnosis of Stage IVB uterine serous carcinoma.      A total of 25 minutes was spent with the patient, 20 minutes of which were spent in counseling the patient and/or treatment planning.        1.  Stage IVB uterine serous carcinoma.   2.  Status post neoadjuvant chemotherapy.   3.  Stage R0 surgical cytoreduction.   4.  Probable  bowel perforation during adjuvant treatment with primary surgical repair.   5.  No evidence of disease.      I discussed with the patient.  We will now continue to follow with surveillance.  We will see her every 3-4 months for the first 2 years, then every 6 months for the next 2 years and then yearly once we get to year 5.  She agrees with this plan.  She is very appreciative of her care.  All questions were answered.         Prabhjot Rm MD, MS    Department of Obstetrics and Gynecology   Division of Gynecologic Oncology   [facility]  Phone: [phone]      CC  Patient Care Team:  Adebayo Muro as PCP - General (Family Practice)  SELF, REFERRED

## 2019-08-28 NOTE — PATIENT INSTRUCTIONS
Breast Cancer Screening: During our visit today, we discussed that it is recommended you receive breast cancer screening. Please call or make an appointment with your primary care provider to discuss this with them. You may also call the [facility] scheduling line ([phone]) to set up a mammography appointment at the Breast Center within the [facility].    Colorectal Cancer Screening: During our visit today, we discussed that it is recommended you receive colorectal cancer screening. Please call or make an appointment with your primary care provider to discuss this. You may also call the  [facility] scheduling line ([phone]) to set up a colonoscopy appointment.

## 2019-08-28 NOTE — LETTER
2019       RE: Manda Santacruz  [address] [facility] [address]     Dear Colleague,    Thank you for referring your patient, Manda Santacruz, to the [facility]. Please see a copy of my visit note below.                Follow Up Notes on Referred Patient    Date: 2019       Dr. Neeru Segura MD  No address on file       RE: Manda Santacruz  : 1960  CULLEN: 2019    Dear Dr. Neeru Segura:    Manda Santacruz is a 58 year old woman with a diagnosis of Stage IVB uterine serous carcinoma.     The patient presents today for followup.  She has been doing well.  She finished her adjuvant chemotherapy.  She is eating and drinking well.  No nausea or vomiting, fever or chills.  Normal urinary and bowel function.  No vaginal bleeding or discharge.  No B symptoms.       Past Medical History:    Past Medical History:   Diagnosis Date     Perforated bowel (H)      Uterine cancer (H)          Past Surgical History:    Past Surgical History:   Procedure Laterality Date     HYSTERECTOMY RADICAL, SALPINGO-OOPHORECTOMY, NODE DISSECTION, TUMOR DEBULKING N/A 2018    Procedure: Exploratory Laparotomy, Lysis of adhesions x 45minutes, Total Abdominal Radical Hysterectomy, Bilateral Salpingo-Oophorectomy, Tumor Debulking, Omentectomy, Bilateral Pelvic and Para Aortic Lymph Node Dissection, Bilateral Ureterolysis, Liver mobilization;  Surgeon: Prabhjot Rm MD;  Location: [facility] OR     LAPAROTOMY EXPLORATORY           Health Maintenance Due   Topic Date Due     PREVENTIVE CARE VISIT  1960     MICROALBUMIN  1961     TSH W/FREE T4 REFLEX  1961     DIABETIC FOOT EXAM  1961     HEPATITIS C SCREENING  1961     EYE EXAM  1961     MAMMO SCREENING  1961     PAP  1961     COLONOSCOPY  1971     HIV SCREENING  1976     ZOSTER IMMUNIZATION (1 of 2) 2011     A1C  2019     LIPID  2019       Current Medications:      Current Outpatient Medications   Medication Sig Dispense Refill     aspirin 81 MG chewable tablet Take 81 mg by mouth daily (with lunch) ON HOLD FOR SURGERY SINCE 11/07/2018       senna-docusate (SENOKOT-S;PERICOLACE) 8.6-50 MG per tablet Take 2 tablets by mouth 2 times daily 100 tablet 0     losartan-hydrochlorothiazide (HYZAAR) 100-12.5 MG per tablet Take 1 tablet by mouth as needed ONLY TAKES WHEN HER BLOOD PRESSURE IS HIGH           Allergies:      No Known Allergies     Social History:     Social History     Tobacco Use     Smoking status: Never Smoker     Smokeless tobacco: Never Used   Substance Use Topics     Alcohol use: Not on file       History   Drug Use Not on file         Family History:       Family History   Problem Relation Age of Onset     Cerebrovascular Disease Mother 34     Cervical Cancer No family hx of      Uterine Cancer No family hx of      Breast Cancer No family hx of      Pancreatic Cancer No family hx of      Colon Cancer No family hx of          Physical Exam:     BP (!) 144/92   Pulse 71   Temp 97.8  F (36.6  C) (Oral)   Resp 16   Wt 97.1 kg (214 lb)   SpO2 97%   BMI 37.64 kg/m     Body mass index is 37.64 kg/m .    General Appearance:  healthy and alert, no distress     Musculoskeletal:         extremities non tender and without edema     Neurological:   normal gait, no gross defects                Psychiatric:      appropriate mood and affect                               Hematological:            normal cervical, supraclavicular and inguinal lymph nodes                ABDOMEN:  Soft, nontender and nondistended.  No organomegaly.  Well-healed midline incision.   PELVIC:  Normal external genitalia.  Normal vaginal mucosa.  Well-healed vaginal cuff.  No adnexal masses or tenderness.  Rectovaginal confirms.               Assessment:     Manda Santacruz is a 58 year old woman with a diagnosis of Stage IVB uterine serous carcinoma.      A total of 25 minutes was spent with  the patient, 20 minutes of which were spent in counseling the patient and/or treatment planning.        1.  Stage IVB uterine serous carcinoma.   2.  Status post neoadjuvant chemotherapy.   3.  Stage R0 surgical cytoreduction.   4.  Probable bowel perforation during adjuvant treatment with primary surgical repair.   5.  No evidence of disease.      I discussed with the patient.  We will now continue to follow with surveillance.  We will see her every 3-4 months for the first 2 years, then every 6 months for the next 2 years and then yearly once we get to year 5.  She agrees with this plan.  She is very appreciative of her care.  All questions were answered.         Prabhjot Rm MD, MS    Department of Obstetrics and Gynecology   Division of Gynecologic Oncology   [facility]  Phone: [phone]      CC  Patient Care Team:  Adebayo Muro as PCP - General (Family Practice)  SELF, REFERRED

## 2019-08-28 NOTE — NURSING NOTE
"Oncology Rooming Note    August 28, 2019 7:32 AM   Mandarabia Santacruz is a 59 year old female who presents for:    Chief Complaint   Patient presents with     Oncology Clinic Visit     Return Endometrial Ca     Initial Vitals: BP (!) 144/92   Pulse 71   Temp 97.8  F (36.6  C) (Oral)   Resp 16   Wt 97.1 kg (214 lb)   SpO2 97%   BMI 37.64 kg/m   Estimated body mass index is 37.64 kg/m  as calculated from the following:    Height as of 5/1/19: 1.606 m (5' 3.23\").    Weight as of this encounter: 97.1 kg (214 lb). Body surface area is 2.08 meters squared.  No Pain (0) Comment: Data Unavailable   No LMP recorded. Patient has had a hysterectomy.  Allergies reviewed: Yes  Medications reviewed: Yes    Medications: Medication refills not needed today.  Pharmacy name entered into EPIC: [facility] [place], [place] - [address] [place] [address] [phone]    Clinical concerns: hernia       Lisa Moseley CMA              "

## 2019-10-10 PROBLEM — C54.1 ENDOMETRIAL CANCER (H): Status: ACTIVE | Noted: 2018-02-09

## 2019-11-08 ENCOUNTER — ONCOLOGY VISIT (OUTPATIENT)
Dept: ONCOLOGY | Facility: CLINIC | Age: 59
End: 2019-11-08
Attending: OBSTETRICS & GYNECOLOGY
Payer: COMMERCIAL

## 2019-11-08 VITALS
WEIGHT: 223 LBS | DIASTOLIC BLOOD PRESSURE: 87 MMHG | TEMPERATURE: 97.9 F | SYSTOLIC BLOOD PRESSURE: 151 MMHG | OXYGEN SATURATION: 100 % | RESPIRATION RATE: 16 BRPM | HEART RATE: 62 BPM | BODY MASS INDEX: 39.22 KG/M2

## 2019-11-08 DIAGNOSIS — Z85.42 ENCOUNTER FOR FOLLOW-UP SURVEILLANCE OF ENDOMETRIAL CANCER: Primary | ICD-10-CM

## 2019-11-08 DIAGNOSIS — Z08 ENCOUNTER FOR FOLLOW-UP SURVEILLANCE OF ENDOMETRIAL CANCER: Primary | ICD-10-CM

## 2019-11-08 PROCEDURE — G0463 HOSPITAL OUTPT CLINIC VISIT: HCPCS | Mod: ZF

## 2019-11-08 PROCEDURE — 99213 OFFICE O/P EST LOW 20 MIN: CPT | Mod: ZP | Performed by: NURSE PRACTITIONER

## 2019-11-08 ASSESSMENT — PAIN SCALES - GENERAL: PAINLEVEL: MILD PAIN (2)

## 2019-11-08 NOTE — NURSING NOTE
"Oncology Rooming Note    November 8, 2019 9:58 AM   Manda Santacruz is a 59 year old female who presents for:    Chief Complaint   Patient presents with     Oncology Clinic Visit     Return Endometrial Ca     Initial Vitals: BP (!) 151/100   Pulse 62   Temp 97.9  F (36.6  C) (Oral)   Resp 16   Wt 101.2 kg (223 lb)   LMP  (LMP Unknown)   SpO2 100%   Breastfeeding? No   BMI 39.22 kg/m   Estimated body mass index is 39.22 kg/m  as calculated from the following:    Height as of 5/1/19: 1.606 m (5' 3.23\").    Weight as of this encounter: 101.2 kg (223 lb). Body surface area is 2.12 meters squared.  Mild Pain (2) Comment: headache   No LMP recorded (lmp unknown). Patient has had a hysterectomy.  Allergies reviewed: Yes  Medications reviewed: Yes    Medications: Medication refills not needed today.  Pharmacy name entered into EPIC:    [facility] [phone]  [facility] #[id number] [place] [address] [address] [facility] [address]    Clinical concerns: No new concerns.         Lisa Moseley CMA              "

## 2019-11-08 NOTE — PATIENT INSTRUCTIONS
Breast Cancer Screening: During our visit today, we discussed that it is recommended you receive breast cancer screening. Please call or make an appointment with your primary care provider to discuss this with them. You may also call the [facility] scheduling line ([phone]) to set up a mammography appointment at the Breast [facility] within the [facility].    Colorectal Cancer Screening: During our visit today, we discussed that it is recommended you receive colorectal cancer screening. Please call or make an appointment with your primary care provider to discuss this. You may also call the  [facility] scheduling line ([phone]) to set up a colonoscopy appointment.

## 2019-11-08 NOTE — LETTER
2019       RE: Manda Santacruz  [address] [place] [id number]     Dear Colleague,    Thank you for referring your patient, Manda Santacruz, to the [facility]. Please see a copy of my visit note below.    Gynecologic Oncology Follow-Up Visit    RE: Manda Santacruz  MRN: [id number]  : 1960  Date of Visit: 2019    CC: Manda Santacruz  is a 59 year old female with a history of stage IVB uterine serous carcinoma. She completed treatment with surgery and chemotherapy on 2019. She presents today for a three month surveillance visit.    HPI: Manda comes to the clinic without gynecologic concerns. 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 She is overdue for a mammogram and a colonoscopy, declines these. Declines influenza vaccine today. Up to date on seeing PCP. Denies pelvic pain, vaginal bleeding, shortness of breath, cough, abdominal pain, bloating, early satiety, swelling in the legs, back pain, new lumps/bumps in groin, unintended weight loss.    Oncology History:  - 2018 CT abdomen pelvis with contrast to evaluate acute abdominal pain: Enlarged uterus, bilateral hydrosalpinx, ? obstructing lesion  - 2018 endometrial biopsy showed scant fragments of high-grade carcinoma, consistent with serous carcinoma, at least endometrial intraepithelial carcinoma    Eval by Dr. Rm: Large protruding mass from the cervix noted.    - 3/19/2018 CT chest abdomen pelvis with contrast: Markedly thickened endometrial stripe with masslike protrusion into the vaginal vault, ill defined peripherally enhancing masses in the left lower quadrant abutting small bowel, favored to represent metastatic deposits, hydrosalpinx/pyosalpinx, suspicious for tubo-ovarian abscess, indeterminate retroperitoneal lymph nodes    -2018-10/3/18: Six cycles neoadjuvant carboplatin/paclitaxel    2018 CT chest abdomen pelvis with contrast at  [facility]: Decreased size of the peripherally enhancing fluid collection anterior to the uterus, as well as decreased fluid dietitian of the fallopian tubes, unchanged masslike protrusion into the vagina vault. Decreased size of the peritoneal metastatic lesion adjacent to the loop of jejunum in the left upper quadrant, stable appearance of the globular uterus    6/27/18: Patient presented to ER with left lower quadrant abdominal pain  She had small bowel perforation and was transferred to [facility] [place] [facility] where she had urgent surgery.    On 6/28/18 She underwent exploratory laparotomy, lysis of adhesions, and primary repair of jejunal perforation    Her repeat CT abdomen pelvis with contrast 8/10/18: This was compared to her scan from 6/27, and the radiologist noted a slight increase in size of the low attenuation mass in the cervix (6.1 cm up from 5.9 cm). There are also some peripherally enhancing lesions within the subcutaneous tissues of the anterior abdomen, or may represent phlegmon or abscesses or less likely metastatic deposits.      Repeat scan on 10/17/18 at [place] showed stable appearance of the endometrial cavity with masslike cervical distention and protrusion into the vagina    -11/13/18: She underwent exploratory laparotomy, MR-hysterectomy, BSO, bilateral PPALND, omentectomy to R0  Intraoperatively, there was a large mass prolapsing through the cervix into the vagina, no parametrial involvement. 4 small less than 1 mm nodules on the mesentery. No residual disease identified intraoperatively.  Pathology showed no residual carcinoma.    1/11/2019-2/22/19: Cycles 7-9 carboplatin/paclitaxel      Past Medical History:   Diagnosis Date     Perforated bowel (H)      Uterine cancer (H)        Past Surgical History:   Procedure Laterality Date     HYSTERECTOMY RADICAL, SALPINGO-OOPHORECTOMY, NODE DISSECTION, TUMOR DEBULKING N/A 11/13/2018    Procedure: Exploratory Laparotomy, Lysis of adhesions x 45minutes, Total  Abdominal Radical Hysterectomy, Bilateral Salpingo-Oophorectomy, Tumor Debulking, Omentectomy, Bilateral Pelvic and Para Aortic Lymph Node Dissection, Bilateral Ureterolysis, Liver mobilization;  Surgeon: Prabhjot Rm MD;  Location: [facility] OR     LAPAROTOMY EXPLORATORY         Social History     Socioeconomic History     Marital status: Single     Spouse name: Not on file     Number of children: Not on file     Years of education: Not on file     Highest education level: Not on file   Occupational History     Not on file   Social Needs     Financial resource strain: Not on file     Food insecurity:     Worry: Not on file     Inability: Not on file     Transportation needs:     Medical: Not on file     Non-medical: Not on file   Tobacco Use     Smoking status: Never Smoker     Smokeless tobacco: Never Used   Substance and Sexual Activity     Alcohol use: Not on file     Drug use: Not on file     Sexual activity: Not on file   Lifestyle     Physical activity:     Days per week: Not on file     Minutes per session: Not on file     Stress: Not on file   Relationships     Social connections:     Talks on phone: Not on file     Gets together: Not on file     Attends [employer] service: Not on file     Active member of club or organization: Not on file     Attends meetings of clubs or organizations: Not on file     Relationship status: Not on file     Intimate partner violence:     Fear of current or ex partner: Not on file     Emotionally abused: Not on file     Physically abused: Not on file     Forced sexual activity: Not on file   Other Topics Concern     Not on file   Social History Narrative     Not on file       Family History   Problem Relation Age of Onset     Cerebrovascular Disease Mother 34     Cervical Cancer No family hx of      Uterine Cancer No family hx of      Breast Cancer No family hx of      Pancreatic Cancer No family hx of      Colon Cancer No family hx of        Current Outpatient Medications    Medication     aspirin 81 MG chewable tablet     losartan-hydrochlorothiazide (HYZAAR) 100-12.5 MG per tablet     senna-docusate (SENOKOT-S;PERICOLACE) 8.6-50 MG per tablet     No current facility-administered medications for this visit.         No Known Allergies      Review of Systems  10 point ROS performed and negative except as listed in HPI      OBJECTIVE:    Physical Exam:    BP (!) 151/87   Pulse 62   Temp 97.9  F (36.6  C) (Oral)   Resp 16   Wt 101.2 kg (223 lb)   LMP  (LMP Unknown)   SpO2 100%   Breastfeeding? No   BMI 39.22 kg/m       CONSTITUTIONAL: Alert non-toxic appearing female in no acute distress  HEAD: Normocephalic, atraumatic  NECK: Neck supple without lymphadenopathy  RESPIRATORY: Lungs clear to auscultation, respiratory effort unlabored  CV: Regular rate and rhythm, S1S2, no clicks, murmurs, rubs, or gallops; bilateral lower extremities without edema, dorsalis pedis pulses 2+ bilaterally  GASTROINTESTINAL: Normoactive bowel sounds x4 quadrants, abdomen obese,  soft, non-distended, and non-tender to palpation without masses or organomegaly  GENITOURINARY: External genitalia and urethral meatus pink without lesions, masses, or excoriation. Vagina pink and smooth without masses or lesions. Vaginal cuff without nodularity, masses, or lesions. Cervix surgically absent. Bimanual exam reveals no masses or fullness. Rectovaginal exam confirms these findings.  LYMPHATIC: Cervical, supraclavicular, and inguinal nodes without lymphadenopathy  MUSCULOSKELETAL: Moves all extremities, no obvious muscle wasting  NEUROLOGIC: No gross deficits, normal gait  SKIN: Appropriate color for race, warm and dry, no rashes or lesions to unclothed skin  PSYCHIATRIC: Flattened affect, short yoni responses, makes appropriate eye contact, thought process linear      Assessment/Plan:  1) History of stage IVB serous uterine carcinoma: No evidence of recurrent disease. Continue surveillance every three months x2  years (through 2/2021) followed by every six months x3 years (through 2/2024) then annually thereafter with pelvic/rectal exam. Reviewed signs and symptoms  of recurrence including but not limited to bleeding from vagina, bladder, or rectum, bloating, early satiety, swelling in the lower extremities, abdominal or lower back pain, changes in bowel or bladder patterns, shortness of breath, increased fatigue, unexplained weight loss, and night sweats. Reviewed recommendations from [facility] not to perform surveillance pap smears in women diagnosed with endometrial cancer as this does not improve detection of local recurrence. Reviewed signs and symptoms for when she should contact the clinic or seek additional care. Patient to contact the clinic with any questions or concerns in the interim.  2) Health maintenance issues discussed today include to follow up with PCP for co-morbid conditions and non-gynecologic issues. She declines colonoscopy, mammogram, and influenza vaccine. Hypertensive today, to continue follow up with her PCP. Reviewed interventions for neuropathy which she declines.  3) Patient verbalized understanding of and agreement with plan.    A total of 15 minutes of face to face time spent with patient, over 50% of which was spent in counseling and coordination of care.    JOANA Ramirez, FNP-C  Division of Gynecologic Oncology  [facility]  Pager: [phone]

## 2019-11-08 NOTE — PROGRESS NOTES
Gynecologic Oncology Follow-Up Visit    RE: Manda Santacruz  MRN: [id number]  : 1960  Date of Visit: 2019    CC: Manda Santacruz  is a 59 year old female with a history of stage IVB uterine serous carcinoma. She completed treatment with surgery and chemotherapy on 2019. She presents today for a three month surveillance visit.    HPI: Manda comes to the clinic without gynecologic concerns. 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 She is overdue for a mammogram and a colonoscopy, declines these. Declines influenza vaccine today. Up to date on seeing PCP. Denies pelvic pain, vaginal bleeding, shortness of breath, cough, abdominal pain, bloating, early satiety, swelling in the legs, back pain, new lumps/bumps in groin, unintended weight loss.    Oncology History:  - 2018 CT abdomen pelvis with contrast to evaluate acute abdominal pain: Enlarged uterus, bilateral hydrosalpinx, ? obstructing lesion  - 2018 endometrial biopsy showed scant fragments of high-grade carcinoma, consistent with serous carcinoma, at least endometrial intraepithelial carcinoma    Eval by Dr. Rm: Large protruding mass from the cervix noted.    - 3/19/2018 CT chest abdomen pelvis with contrast: Markedly thickened endometrial stripe with masslike protrusion into the vaginal vault, ill defined peripherally enhancing masses in the left lower quadrant abutting small bowel, favored to represent metastatic deposits, hydrosalpinx/pyosalpinx, suspicious for tubo-ovarian abscess, indeterminate retroperitoneal lymph nodes    -2018-10/3/18: Six cycles neoadjuvant carboplatin/paclitaxel    2018 CT chest abdomen pelvis with contrast at [facility]: Decreased size of the peripherally enhancing fluid collection anterior to the uterus, as well as decreased fluid dietitian of the fallopian tubes, unchanged masslike protrusion into the vagina vault. Decreased size of the  peritoneal metastatic lesion adjacent to the loop of jejunum in the left upper quadrant, stable appearance of the globular uterus    6/27/18: Patient presented to ER with left lower quadrant abdominal pain  She had small bowel perforation and was transferred to [facility] where she had urgent surgery.    On 6/28/18 She underwent exploratory laparotomy, lysis of adhesions, and primary repair of jejunal perforation    Her repeat CT abdomen pelvis with contrast 8/10/18: This was compared to her scan from 6/27, and the radiologist noted a slight increase in size of the low attenuation mass in the cervix (6.1 cm up from 5.9 cm). There are also some peripherally enhancing lesions within the subcutaneous tissues of the anterior abdomen, or may represent phlegmon or abscesses or less likely metastatic deposits.      Repeat scan on 10/17/18 at [place] showed stable appearance of the endometrial cavity with masslike cervical distention and protrusion into the vagina    -11/13/18: She underwent exploratory laparotomy, MR-hysterectomy, BSO, bilateral PPALND, omentectomy to R0  Intraoperatively, there was a large mass prolapsing through the cervix into the vagina, no parametrial involvement. 4 small less than 1 mm nodules on the mesentery. No residual disease identified intraoperatively.  Pathology showed no residual carcinoma.    1/11/2019-2/22/19: Cycles 7-9 carboplatin/paclitaxel      Past Medical History:   Diagnosis Date     Perforated bowel (H)      Uterine cancer (H)        Past Surgical History:   Procedure Laterality Date     HYSTERECTOMY RADICAL, SALPINGO-OOPHORECTOMY, NODE DISSECTION, TUMOR DEBULKING N/A 11/13/2018    Procedure: Exploratory Laparotomy, Lysis of adhesions x 45minutes, Total Abdominal Radical Hysterectomy, Bilateral Salpingo-Oophorectomy, Tumor Debulking, Omentectomy, Bilateral Pelvic and Para Aortic Lymph Node Dissection, Bilateral Ureterolysis, Liver mobilization;  Surgeon: Prabhjot Rm MD;   Location: [facility] OR     LAPAROTOMY EXPLORATORY         Social History     Socioeconomic History     Marital status: Single     Spouse name: Not on file     Number of children: Not on file     Years of education: Not on file     Highest education level: Not on file   Occupational History     Not on file   Social Needs     Financial resource strain: Not on file     Food insecurity:     Worry: Not on file     Inability: Not on file     Transportation needs:     Medical: Not on file     Non-medical: Not on file   Tobacco Use     Smoking status: Never Smoker     Smokeless tobacco: Never Used   Substance and Sexual Activity     Alcohol use: Not on file     Drug use: Not on file     Sexual activity: Not on file   Lifestyle     Physical activity:     Days per week: Not on file     Minutes per session: Not on file     Stress: Not on file   Relationships     Social connections:     Talks on phone: Not on file     Gets together: Not on file     Attends [employer] service: Not on file     Active member of club or organization: Not on file     Attends meetings of clubs or organizations: Not on file     Relationship status: Not on file     Intimate partner violence:     Fear of current or ex partner: Not on file     Emotionally abused: Not on file     Physically abused: Not on file     Forced sexual activity: Not on file   Other Topics Concern     Not on file   Social History Narrative     Not on file       Family History   Problem Relation Age of Onset     Cerebrovascular Disease Mother 34     Cervical Cancer No family hx of      Uterine Cancer No family hx of      Breast Cancer No family hx of      Pancreatic Cancer No family hx of      Colon Cancer No family hx of        Current Outpatient Medications   Medication     aspirin 81 MG chewable tablet     losartan-hydrochlorothiazide (HYZAAR) 100-12.5 MG per tablet     senna-docusate (SENOKOT-S;PERICOLACE) 8.6-50 MG per tablet     No current facility-administered medications for this  visit.         No Known Allergies      Review of Systems  10 point ROS performed and negative except as listed in HPI      OBJECTIVE:    Physical Exam:    BP (!) 151/87   Pulse 62   Temp 97.9  F (36.6  C) (Oral)   Resp 16   Wt 101.2 kg (223 lb)   LMP  (LMP Unknown)   SpO2 100%   Breastfeeding? No   BMI 39.22 kg/m      CONSTITUTIONAL: Alert non-toxic appearing female in no acute distress  HEAD: Normocephalic, atraumatic  NECK: Neck supple without lymphadenopathy  RESPIRATORY: Lungs clear to auscultation, respiratory effort unlabored  CV: Regular rate and rhythm, S1S2, no clicks, murmurs, rubs, or gallops; bilateral lower extremities without edema, dorsalis pedis pulses 2+ bilaterally  GASTROINTESTINAL: Normoactive bowel sounds x4 quadrants, abdomen obese,  soft, non-distended, and non-tender to palpation without masses or organomegaly  GENITOURINARY: External genitalia and urethral meatus pink without lesions, masses, or excoriation. Vagina pink and smooth without masses or lesions. Vaginal cuff without nodularity, masses, or lesions. Cervix surgically absent. Bimanual exam reveals no masses or fullness. Rectovaginal exam confirms these findings.  LYMPHATIC: Cervical, supraclavicular, and inguinal nodes without lymphadenopathy  MUSCULOSKELETAL: Moves all extremities, no obvious muscle wasting  NEUROLOGIC: No gross deficits, normal gait  SKIN: Appropriate color for race, warm and dry, no rashes or lesions to unclothed skin  PSYCHIATRIC: Flattened affect, short yoni responses, makes appropriate eye contact, thought process linear      Assessment/Plan:  1) History of stage IVB serous uterine carcinoma: No evidence of recurrent disease. Continue surveillance every three months x2 years (through 2/2021) followed by every six months x3 years (through 2/2024) then annually thereafter with pelvic/rectal exam. Reviewed signs and symptoms  of recurrence including but not limited to bleeding from vagina, bladder, or  rectum, bloating, early satiety, swelling in the lower extremities, abdominal or lower back pain, changes in bowel or bladder patterns, shortness of breath, increased fatigue, unexplained weight loss, and night sweats. Reviewed recommendations from [facility] not to perform surveillance pap smears in women diagnosed with endometrial cancer as this does not improve detection of local recurrence. Reviewed signs and symptoms for when she should contact the clinic or seek additional care. Patient to contact the clinic with any questions or concerns in the interim.  2) Health maintenance issues discussed today include to follow up with PCP for co-morbid conditions and non-gynecologic issues. She declines colonoscopy, mammogram, and influenza vaccine. Hypertensive today, to continue follow up with her PCP. Reviewed interventions for neuropathy which she declines.  3) Patient verbalized understanding of and agreement with plan.    A total of 15 minutes of face to face time spent with patient, over 50% of which was spent in counseling and coordination of care.    JOANA Ramirez, FNP-C  Division of Gynecologic Oncology  [facility]  Pager: [phone]

## 2020-06-10 ENCOUNTER — VIRTUAL VISIT (OUTPATIENT)
Dept: ONCOLOGY | Facility: CLINIC | Age: 60
End: 2020-06-10
Attending: OBSTETRICS & GYNECOLOGY
Payer: COMMERCIAL

## 2020-06-10 VITALS
TEMPERATURE: 97 F | WEIGHT: 235.2 LBS | SYSTOLIC BLOOD PRESSURE: 158 MMHG | HEART RATE: 54 BPM | DIASTOLIC BLOOD PRESSURE: 92 MMHG | BODY MASS INDEX: 41.36 KG/M2 | OXYGEN SATURATION: 98 %

## 2020-06-10 DIAGNOSIS — C54.1 ENDOMETRIAL CANCER (H): Primary | ICD-10-CM

## 2020-06-10 PROCEDURE — 40000114 ZZH STATISTIC NO CHARGE CLINIC VISIT

## 2020-06-10 PROCEDURE — 99214 OFFICE O/P EST MOD 30 MIN: CPT | Mod: 95 | Performed by: OBSTETRICS & GYNECOLOGY

## 2020-06-10 ASSESSMENT — PAIN SCALES - GENERAL: PAINLEVEL: NO PAIN (0)

## 2020-06-10 NOTE — LETTER
"    6/10/2020         RE: Manda Santacruz  [address] [place] [id number]        Dear Colleague,    Thank you for referring your patient, Manda Santacruz, to the [facility]. Please see a copy of my visit note below.    Manda Santacruz is a 59 year old female who is being evaluated via a billable telephone visit.      The patient has been notified of following:     \"This telephone visit will be conducted via a call between you and your physician/provider. We have found that certain health care needs can be provided without the need for a physical exam.  This service lets us provide the care you need with a short phone conversation.  If a prescription is necessary we can send it directly to your pharmacy.  If lab work is needed we can place an order for that and you can then stop by our lab to have the test done at a later time.    Telephone visits are billed at different rates depending on your insurance coverage. During this emergency period, for some insurers they may be billed the same as an in-person visit.  Please reach out to your insurance provider with any questions.    If during the course of the call the physician/provider feels a telephone visit is not appropriate, you will not be charged for this service.\"    Patient has given verbal consent for Telephone visit?  Yes    What phone number would you like to be contacted at? [phone]    How would you like to obtain your AVS? Mail a copy    MATTHIEU Cano      Phone call duration: 25 minutes                Follow Up Notes on Referred Patient    Date: 6/10/2020       Dr. Neeru Segura MD  No address on file       RE: Manda Santacruz  : 1960  CULLEN: 6/10/2020    Manda Santacruz is a 58 year old woman with a diagnosis of Stage IVB uterine serous carcinoma.     The patient presents today for followup.  She has been doing well.  She finished her adjuvant chemotherapy.  She is eating and drinking well.  No nausea " or vomiting, fever or chills.  Normal urinary and bowel function.  No vaginal bleeding or discharge.  No B symptoms.     Oncology History:  - 2/8/2018 CT abdomen pelvis with contrast to evaluate acute abdominal pain: Enlarged uterus, bilateral hydrosalpinx, ? obstructing lesion  - 2/9/2018 endometrial biopsy showed scant fragments of high-grade carcinoma, consistent with serous carcinoma, at least endometrial intraepithelial carcinoma    Eval by Dr. Rm: Large protruding mass from the cervix noted.    - 3/19/2018 CT chest abdomen pelvis with contrast: Markedly thickened endometrial stripe with masslike protrusion into the vaginal vault, ill defined peripherally enhancing masses in the left lower quadrant abutting small bowel, favored to represent metastatic deposits, hydrosalpinx/pyosalpinx, suspicious for tubo-ovarian abscess, indeterminate retroperitoneal lymph nodes    -4/16/2018-10/3/18: Six cycles neoadjuvant carboplatin/paclitaxel    6/19/2018 CT chest abdomen pelvis with contrast at [facility]: Decreased size of the peripherally enhancing fluid collection anterior to the uterus, as well as decreased fluid dietitian of the fallopian tubes, unchanged masslike protrusion into the vagina vault. Decreased size of the peritoneal metastatic lesion adjacent to the loop of jejunum in the left upper quadrant, stable appearance of the globular uterus    6/27/18: Patient presented to ER with left lower quadrant abdominal pain  She had small bowel perforation and was transferred to [facility] where she had urgent surgery.    On 6/28/18 She underwent exploratory laparotomy, lysis of adhesions, and primary repair of jejunal perforation    Her repeat CT abdomen pelvis with contrast 8/10/18: This was compared to her scan from 6/27, and the radiologist noted a slight increase in size of the low attenuation mass in the cervix (6.1 cm up from 5.9 cm). There are also some peripherally enhancing lesions within the subcutaneous  tissues of the anterior abdomen, or may represent phlegmon or abscesses or less likely metastatic deposits.      Repeat scan on 10/17/18 at [place] showed stable appearance of the endometrial cavity with masslike cervical distention and protrusion into the vagina    -11/13/18: She underwent exploratory laparotomy, MR-hysterectomy, BSO, bilateral PPALND, omentectomy to R0  Intraoperatively, there was a large mass prolapsing through the cervix into the vagina, no parametrial involvement. 4 small less than 1 mm nodules on the mesentery. No residual disease identified intraoperatively.  Pathology showed no residual carcinoma.    1/11/2019-2/22/19: Cycles 7-9 carboplatin/paclitaxel  Past Medical History:    Past Medical History:   Diagnosis Date     Perforated bowel (H)      Uterine cancer (H)          Past Surgical History:    Past Surgical History:   Procedure Laterality Date     HYSTERECTOMY RADICAL, SALPINGO-OOPHORECTOMY, NODE DISSECTION, TUMOR DEBULKING N/A 11/13/2018    Procedure: Exploratory Laparotomy, Lysis of adhesions x 45minutes, Total Abdominal Radical Hysterectomy, Bilateral Salpingo-Oophorectomy, Tumor Debulking, Omentectomy, Bilateral Pelvic and Para Aortic Lymph Node Dissection, Bilateral Ureterolysis, Liver mobilization;  Surgeon: Prabhjot Rm MD;  Location: [facility] OR     LAPAROTOMY EXPLORATORY           Health Maintenance Due   Topic Date Due     PREVENTIVE CARE VISIT  1960     MICROALBUMIN  1960     DIABETIC FOOT EXAM  1961     HEPATITIS C SCREENING  1961     EYE EXAM  1961     MAMMO SCREENING  1961     COLORECTAL CANCER SCREENING  07/25/1971     HIV SCREENING  07/25/1976     PNEUMOCOCCAL IMMUNIZATION 19-64 MEDIUM RISK (1 of 1 - PPSV23) 07/25/1980     PAP  07/25/1982     ZOSTER IMMUNIZATION (1 of 2) 07/25/2011     A1C  02/09/2019     LIPID  06/27/2019     BMP  11/16/2019     PHQ-2  01/01/2020       Current Medications:     Current Outpatient Medications    Medication Sig Dispense Refill     losartan-hydrochlorothiazide (HYZAAR) 100-12.5 MG per tablet Take 1 tablet by mouth as needed ONLY TAKES WHEN HER BLOOD PRESSURE IS HIGH       senna-docusate (SENOKOT-S;PERICOLACE) 8.6-50 MG per tablet Take 2 tablets by mouth 2 times daily 100 tablet 0     aspirin 81 MG chewable tablet Take 81 mg by mouth daily (with lunch) ON HOLD FOR SURGERY SINCE 11/07/2018           Allergies:      No Known Allergies     Social History:     Social History     Tobacco Use     Smoking status: Never Smoker     Smokeless tobacco: Never Used   Substance Use Topics     Alcohol use: Not on file       History   Drug Use Not on file         Family History:       Family History   Problem Relation Age of Onset     Cerebrovascular Disease Mother 34     Cervical Cancer No family hx of      Uterine Cancer No family hx of      Breast Cancer No family hx of      Pancreatic Cancer No family hx of      Colon Cancer No family hx of          Physical Exam:     BP (!) 158/92   Pulse 54   Temp 97  F (36.1  C) (Tympanic)   Wt 106.7 kg (235 lb 3.2 oz)   LMP  (LMP Unknown)   SpO2 98%   BMI 41.36 kg/m    Body mass index is 41.36 kg/m .    General Appearance:  healthy and alert, no distress                Psychiatric:      appropriate mood and affect                                 Assessment:     Manda Santacruz is a 58 year old woman with a diagnosis of Stage IVB uterine serous carcinoma.      A total of 25 minutes was spent with the patient, 20 minutes of which were spent in counseling the patient and/or treatment planning.        1.  Stage IVB uterine serous carcinoma.   2.  Status post neoadjuvant chemotherapy.   3.  Stage R0 surgical cytoreduction.   4.  Probable bowel perforation during adjuvant treatment with primary surgical repair.   5.  No evidence of disease.      I discussed with the patient.  We will now continue to follow with surveillance.  We will see her every 3-4 months for the first 2  years, then every 6 months for the next 2 years and then yearly once we get to year 5.  She agrees with this plan.  She is very appreciative of her care.  All questions were answered.         Prabhjot Rm MD, MS    Department of Obstetrics and Gynecology   Division of Gynecologic Oncology   [facility]  Phone: [phone]      CC  Patient Care Team:  Adebayo Muro as PCP - General (Family Practice)

## 2020-06-10 NOTE — PROGRESS NOTES
"Manda Santacruz is a 59 year old female who is being evaluated via a billable telephone visit.      The patient has been notified of following:     \"This telephone visit will be conducted via a call between you and your physician/provider. We have found that certain health care needs can be provided without the need for a physical exam.  This service lets us provide the care you need with a short phone conversation.  If a prescription is necessary we can send it directly to your pharmacy.  If lab work is needed we can place an order for that and you can then stop by our lab to have the test done at a later time.    Telephone visits are billed at different rates depending on your insurance coverage. During this emergency period, for some insurers they may be billed the same as an in-person visit.  Please reach out to your insurance provider with any questions.    If during the course of the call the physician/provider feels a telephone visit is not appropriate, you will not be charged for this service.\"    Patient has given verbal consent for Telephone visit?  Yes    What phone number would you like to be contacted at? [phone]    How would you like to obtain your AVS? Mail a copy    MATTHIEU Cano      Phone call duration: 25 minutes                Follow Up Notes on Referred Patient    Date: 6/10/2020       Dr. Neeru Segura MD  No address on file       RE: Manda Santacruz  : 1960  CULLEN: 6/10/2020    Manda Santacruz is a 58 year old woman with a diagnosis of Stage IVB uterine serous carcinoma.     The patient presents today for followup.  She has been doing well.  She finished her adjuvant chemotherapy.  She is eating and drinking well.  No nausea or vomiting, fever or chills.  Normal urinary and bowel function.  No vaginal bleeding or discharge.  No B symptoms.     Oncology History:  - 2018 CT abdomen pelvis with contrast to evaluate acute abdominal pain: Enlarged uterus, bilateral " hydrosalpinx, ? obstructing lesion  - 2/9/2018 endometrial biopsy showed scant fragments of high-grade carcinoma, consistent with serous carcinoma, at least endometrial intraepithelial carcinoma    Eval by Dr. Rm: Large protruding mass from the cervix noted.    - 3/19/2018 CT chest abdomen pelvis with contrast: Markedly thickened endometrial stripe with masslike protrusion into the vaginal vault, ill defined peripherally enhancing masses in the left lower quadrant abutting small bowel, favored to represent metastatic deposits, hydrosalpinx/pyosalpinx, suspicious for tubo-ovarian abscess, indeterminate retroperitoneal lymph nodes    -4/16/2018-10/3/18: Six cycles neoadjuvant carboplatin/paclitaxel    6/19/2018 CT chest abdomen pelvis with contrast at [facility]: Decreased size of the peripherally enhancing fluid collection anterior to the uterus, as well as decreased fluid dietitian of the fallopian tubes, unchanged masslike protrusion into the vagina vault. Decreased size of the peritoneal metastatic lesion adjacent to the loop of jejunum in the left upper quadrant, stable appearance of the globular uterus    6/27/18: Patient presented to ER with left lower quadrant abdominal pain  She had small bowel perforation and was transferred to [facility] where she had urgent surgery.    On 6/28/18 She underwent exploratory laparotomy, lysis of adhesions, and primary repair of jejunal perforation    Her repeat CT abdomen pelvis with contrast 8/10/18: This was compared to her scan from 6/27, and the radiologist noted a slight increase in size of the low attenuation mass in the cervix (6.1 cm up from 5.9 cm). There are also some peripherally enhancing lesions within the subcutaneous tissues of the anterior abdomen, or may represent phlegmon or abscesses or less likely metastatic deposits.      Repeat scan on 10/17/18 at [place] showed stable appearance of the endometrial cavity with masslike cervical distention and protrusion  into the vagina    -11/13/18: She underwent exploratory laparotomy, MR-hysterectomy, BSO, bilateral PPALND, omentectomy to R0  Intraoperatively, there was a large mass prolapsing through the cervix into the vagina, no parametrial involvement. 4 small less than 1 mm nodules on the mesentery. No residual disease identified intraoperatively.  Pathology showed no residual carcinoma.    1/11/2019-2/22/19: Cycles 7-9 carboplatin/paclitaxel  Past Medical History:    Past Medical History:   Diagnosis Date     Perforated bowel (H)      Uterine cancer (H)          Past Surgical History:    Past Surgical History:   Procedure Laterality Date     HYSTERECTOMY RADICAL, SALPINGO-OOPHORECTOMY, NODE DISSECTION, TUMOR DEBULKING N/A 11/13/2018    Procedure: Exploratory Laparotomy, Lysis of adhesions x 45minutes, Total Abdominal Radical Hysterectomy, Bilateral Salpingo-Oophorectomy, Tumor Debulking, Omentectomy, Bilateral Pelvic and Para Aortic Lymph Node Dissection, Bilateral Ureterolysis, Liver mobilization;  Surgeon: Prabhjot Rm MD;  Location: [facility] OR     LAPAROTOMY EXPLORATORY           Health Maintenance Due   Topic Date Due     PREVENTIVE CARE VISIT  1960     MICROALBUMIN  1961     DIABETIC FOOT EXAM  1961     HEPATITIS C SCREENING  1961     EYE EXAM  1961     MAMMO SCREENING  1961     COLORECTAL CANCER SCREENING  07/25/1971     HIV SCREENING  07/25/1976     PNEUMOCOCCAL IMMUNIZATION 19-64 MEDIUM RISK (1 of 1 - PPSV23) 07/25/1980     PAP  07/25/1982     ZOSTER IMMUNIZATION (1 of 2) 07/25/2011     A1C  02/09/2019     LIPID  06/27/2019     BMP  11/16/2019     PHQ-2  01/01/2020       Current Medications:     Current Outpatient Medications   Medication Sig Dispense Refill     losartan-hydrochlorothiazide (HYZAAR) 100-12.5 MG per tablet Take 1 tablet by mouth as needed ONLY TAKES WHEN HER BLOOD PRESSURE IS HIGH       senna-docusate (SENOKOT-S;PERICOLACE) 8.6-50 MG per tablet Take 2 tablets by  mouth 2 times daily 100 tablet 0     aspirin 81 MG chewable tablet Take 81 mg by mouth daily (with lunch) ON HOLD FOR SURGERY SINCE 11/07/2018           Allergies:      No Known Allergies     Social History:     Social History     Tobacco Use     Smoking status: Never Smoker     Smokeless tobacco: Never Used   Substance Use Topics     Alcohol use: Not on file       History   Drug Use Not on file         Family History:       Family History   Problem Relation Age of Onset     Cerebrovascular Disease Mother 34     Cervical Cancer No family hx of      Uterine Cancer No family hx of      Breast Cancer No family hx of      Pancreatic Cancer No family hx of      Colon Cancer No family hx of          Physical Exam:     BP (!) 158/92   Pulse 54   Temp 97  F (36.1  C) (Tympanic)   Wt 106.7 kg (235 lb 3.2 oz)   LMP  (LMP Unknown)   SpO2 98%   BMI 41.36 kg/m    Body mass index is 41.36 kg/m .    General Appearance:  healthy and alert, no distress                Psychiatric:      appropriate mood and affect                                 Assessment:     Manda Santacruz is a 58 year old woman with a diagnosis of Stage IVB uterine serous carcinoma.      A total of 25 minutes was spent with the patient, 20 minutes of which were spent in counseling the patient and/or treatment planning.        1.  Stage IVB uterine serous carcinoma.   2.  Status post neoadjuvant chemotherapy.   3.  Stage R0 surgical cytoreduction.   4.  Probable bowel perforation during adjuvant treatment with primary surgical repair.   5.  No evidence of disease.      I discussed with the patient.  We will now continue to follow with surveillance.  We will see her every 3-4 months for the first 2 years, then every 6 months for the next 2 years and then yearly once we get to year 5.  She agrees with this plan.  She is very appreciative of her care.  All questions were answered.         Prabhjot Rm MD, MS    Department of  Obstetrics and Gynecology   Division of Gynecologic Oncology   [facility]  Phone: [phone]      CC  Patient Care Team:  Adebayo Muro as PCP - General (Family Practice)  SELF, REFERRED

## 2020-09-16 ENCOUNTER — VIRTUAL VISIT (OUTPATIENT)
Dept: ONCOLOGY | Facility: CLINIC | Age: 60
End: 2020-09-16
Attending: OBSTETRICS & GYNECOLOGY
Payer: COMMERCIAL

## 2020-09-16 DIAGNOSIS — C54.1 ENDOMETRIAL CANCER (H): Primary | ICD-10-CM

## 2020-09-16 PROCEDURE — 40001009 ZZH VIDEO/TELEPHONE VISIT; NO CHARGE

## 2020-09-16 PROCEDURE — 99214 OFFICE O/P EST MOD 30 MIN: CPT | Mod: 95 | Performed by: OBSTETRICS & GYNECOLOGY

## 2020-09-16 NOTE — LETTER
"    9/16/2020         RE: Manda Santacruz  [address] [facility] [address]        Dear Colleague,    Thank you for referring your patient, Manda Santacruz, to the [facility]. Please see a copy of my visit note below.    Manda Santacruz is a 60 year old female who is being evaluated via a billable video visit.      The patient has been notified of following:     \"This video visit will be conducted via a call between you and your physician/provider. We have found that certain health care needs can be provided without the need for an in-person physical exam.  This service lets us provide the care you need with a video conversation.  If a prescription is necessary we can send it directly to your pharmacy.  If lab work is needed we can place an order for that and you can then stop by our lab to have the test done at a later time.    Video visits are billed at different rates depending on your insurance coverage.  Please reach out to your insurance provider with any questions.    If during the course of the call the physician/provider feels a video visit is not appropriate, you will not be charged for this service.\"    Patient has given verbal consent for Video visit? Yes  How would you like to obtain your AVS? MyChart  If you are dropped from the video visit, the video invite should be resent to: Text to cell phone: [phone]  Will anyone else be joining your video visit? No          I have reviewed and updated the patient's allergies and medication list. Patient was asked if they had any patient reported vital signs to present, if yes, please see documented vitals.  Patient was also asked for their current weight and height, if presented, documented in vitals.    Concerns: Patient denies pelvic and vaginal pain at today's visit.      Refills:     Juliet Adrian CMA    Video visit: 25 minutes.                Follow Up Notes on Referred Patient    Date: 9/16/2020       Dr. Prabhjot Rm, " MD  [address] [place] [address]       RE: Manda Santacruz  : 1960  CULLEN: 2020    Manda Santacruz is a 60 year old woman with a diagnosis of Stage IVB uterine serous carcinoma.     The patient presents today for followup.  She has been doing well.  She finished her adjuvant chemotherapy.  She is eating and drinking well.  No nausea or vomiting, fever or chills.  Normal urinary and bowel function.  No vaginal bleeding or discharge.  No B symptoms.      Oncology History:  - 2018 CT abdomen pelvis with contrast to evaluate acute abdominal pain: Enlarged uterus, bilateral hydrosalpinx, ? obstructing lesion  - 2018 endometrial biopsy showed scant fragments of high-grade carcinoma, consistent with serous carcinoma, at least endometrial intraepithelial carcinoma    Eval by Dr. Rm: Large protruding mass from the cervix noted.    - 3/19/2018 CT chest abdomen pelvis with contrast: Markedly thickened endometrial stripe with masslike protrusion into the vaginal vault, ill defined peripherally enhancing masses in the left lower quadrant abutting small bowel, favored to represent metastatic deposits, hydrosalpinx/pyosalpinx, suspicious for tubo-ovarian abscess, indeterminate retroperitoneal lymph nodes    -2018-10/3/18: Six cycles neoadjuvant carboplatin/paclitaxel    2018 CT chest abdomen pelvis with contrast at [facility]: Decreased size of the peripherally enhancing fluid collection anterior to the uterus, as well as decreased fluid dietitian of the fallopian tubes, unchanged masslike protrusion into the vagina vault. Decreased size of the peritoneal metastatic lesion adjacent to the loop of jejunum in the left upper quadrant, stable appearance of the globular uterus    18: Patient presented to ER with left lower quadrant abdominal pain  She had small bowel perforation and was transferred to [facility] where she had urgent surgery.    On 18 She underwent exploratory  laparotomy, lysis of adhesions, and primary repair of jejunal perforation    Her repeat CT abdomen pelvis with contrast 8/10/18: This was compared to her scan from 6/27, and the radiologist noted a slight increase in size of the low attenuation mass in the cervix (6.1 cm up from 5.9 cm). There are also some peripherally enhancing lesions within the subcutaneous tissues of the anterior abdomen, or may represent phlegmon or abscesses or less likely metastatic deposits.      Repeat scan on 10/17/18 at [facility] showed stable appearance of the endometrial cavity with masslike cervical distention and protrusion into the vagina    -11/13/18: She underwent exploratory laparotomy, MR-hysterectomy, BSO, bilateral PPALND, omentectomy to R0  Intraoperatively, there was a large mass prolapsing through the cervix into the vagina, no parametrial involvement. 4 small less than 1 mm nodules on the mesentery. No residual disease identified intraoperatively.  Pathology showed no residual carcinoma.    1/11/2019-2/22/19: Cycles 7-9 carboplatin/paclitaxel      Past Medical History:    Past Medical History:   Diagnosis Date     Perforated bowel (H)      Uterine cancer (H)          Past Surgical History:    Past Surgical History:   Procedure Laterality Date     HYSTERECTOMY RADICAL, SALPINGO-OOPHORECTOMY, NODE DISSECTION, TUMOR DEBULKING N/A 11/13/2018    Procedure: Exploratory Laparotomy, Lysis of adhesions x 45minutes, Total Abdominal Radical Hysterectomy, Bilateral Salpingo-Oophorectomy, Tumor Debulking, Omentectomy, Bilateral Pelvic and Para Aortic Lymph Node Dissection, Bilateral Ureterolysis, Liver mobilization;  Surgeon: Prabhjot Rm MD;  Location: [facility] OR     LAPAROTOMY EXPLORATORY           Health Maintenance Due   Topic Date Due     PREVENTIVE CARE VISIT  1960     MICROALBUMIN  1960     DIABETIC FOOT EXAM  1961     HEPATITIS C SCREENING  1961     EYE EXAM  1961     MAMMO SCREENING  1961      COLORECTAL CANCER SCREENING  07/25/1971     HIV SCREENING  07/25/1976     PNEUMOCOCCAL IMMUNIZATION 19-64 MEDIUM RISK (1 of 1 - PPSV23) 07/25/1980     HEPATITIS B IMMUNIZATION (1 of 3 - Risk 3-dose series) 07/25/1980     PAP  07/25/1982     ZOSTER IMMUNIZATION (1 of 2) 07/25/2011     A1C  02/09/2019     LIPID  06/27/2019     BMP  11/16/2019     PHQ-2  01/01/2020     INFLUENZA VACCINE (1) 09/01/2020       Current Medications:     Current Outpatient Medications   Medication Sig Dispense Refill     aspirin 81 MG chewable tablet Take 81 mg by mouth daily (with lunch) ON HOLD FOR SURGERY SINCE 11/07/2018       senna-docusate (SENOKOT-S;PERICOLACE) 8.6-50 MG per tablet Take 2 tablets by mouth 2 times daily 100 tablet 0     losartan-hydrochlorothiazide (HYZAAR) 100-12.5 MG per tablet Take 1 tablet by mouth as needed ONLY TAKES WHEN HER BLOOD PRESSURE IS HIGH           Allergies:      No Known Allergies     Social History:     Social History     Tobacco Use     Smoking status: Never Smoker     Smokeless tobacco: Never Used   Substance Use Topics     Alcohol use: Not on file       History   Drug Use Not on file         Family History:         Family History   Problem Relation Age of Onset     Cerebrovascular Disease Mother 34     Cervical Cancer No family hx of      Uterine Cancer No family hx of      Breast Cancer No family hx of      Pancreatic Cancer No family hx of      Colon Cancer No family hx of          Physical Exam:     LMP  (LMP Unknown)   There is no height or weight on file to calculate BMI.    General Appearance:  healthy and alert, no distress                Psychiatric:      appropriate mood and affect                                  Assessment:     Manda Santacruz is a 60 year old woman with a diagnosis of Stage IVB uterine serous carcinoma.      A total of 25 minutes was spent with the patient, 20 minutes of which were spent in counseling the patient and/or treatment planning.        1.  Stage IVB  uterine serous carcinoma.   2.  Status post neoadjuvant chemotherapy.   3.  Stage R0 surgical cytoreduction.   4.  Probable bowel perforation during adjuvant treatment with primary surgical repair.   5.  No evidence of disease.      I discussed with the patient.  We will now continue to follow with surveillance.  We will see her every 3-4 months for the first 2 years, then every 6 months for the next 2 years and then yearly once we get to year 5.  She agrees with this plan.  She is very appreciative of her care.  All questions were answered.         Prabhjot Rm MD, MS    Department of Obstetrics and Gynecology   Division of Gynecologic Oncology   [facility]  Phone: [phone]      CC  Patient Care Team:  Adebayo Muro as PCP - General (Family Practice)  Alem Arguello APRN CNP as Assigned PCP

## 2020-09-16 NOTE — PROGRESS NOTES
"Manda Santacruz is a 60 year old female who is being evaluated via a billable video visit.      The patient has been notified of following:     \"This video visit will be conducted via a call between you and your physician/provider. We have found that certain health care needs can be provided without the need for an in-person physical exam.  This service lets us provide the care you need with a video conversation.  If a prescription is necessary we can send it directly to your pharmacy.  If lab work is needed we can place an order for that and you can then stop by our lab to have the test done at a later time.    Video visits are billed at different rates depending on your insurance coverage.  Please reach out to your insurance provider with any questions.    If during the course of the call the physician/provider feels a video visit is not appropriate, you will not be charged for this service.\"    Patient has given verbal consent for Video visit? Yes  How would you like to obtain your AVS? MyChart  If you are dropped from the video visit, the video invite should be resent to: Text to cell phone: [phone]  Will anyone else be joining your video visit? No          I have reviewed and updated the patient's allergies and medication list. Patient was asked if they had any patient reported vital signs to present, if yes, please see documented vitals.  Patient was also asked for their current weight and height, if presented, documented in vitals.    Concerns: Patient denies pelvic and vaginal pain at today's visit.      Refills:     Juliet Adrian CMA    Video visit: 25 minutes.                Follow Up Notes on Referred Patient    Date: 2020       Dr. Prabhjot Rm MD  [address] [place] [id number]       RE: Manda Santacruz  : 1960  CULLEN: 2020    Manda Santacruz is a 60 year old woman with a diagnosis of Stage IVB uterine serous carcinoma.     The patient presents today for " followup.  She has been doing well.  She finished her adjuvant chemotherapy.  She is eating and drinking well.  No nausea or vomiting, fever or chills.  Normal urinary and bowel function.  No vaginal bleeding or discharge.  No B symptoms.      Oncology History:  - 2/8/2018 CT abdomen pelvis with contrast to evaluate acute abdominal pain: Enlarged uterus, bilateral hydrosalpinx, ? obstructing lesion  - 2/9/2018 endometrial biopsy showed scant fragments of high-grade carcinoma, consistent with serous carcinoma, at least endometrial intraepithelial carcinoma    Eval by Dr. Rm: Large protruding mass from the cervix noted.    - 3/19/2018 CT chest abdomen pelvis with contrast: Markedly thickened endometrial stripe with masslike protrusion into the vaginal vault, ill defined peripherally enhancing masses in the left lower quadrant abutting small bowel, favored to represent metastatic deposits, hydrosalpinx/pyosalpinx, suspicious for tubo-ovarian abscess, indeterminate retroperitoneal lymph nodes    -4/16/2018-10/3/18: Six cycles neoadjuvant carboplatin/paclitaxel    6/19/2018 CT chest abdomen pelvis with contrast at [facility]: Decreased size of the peripherally enhancing fluid collection anterior to the uterus, as well as decreased fluid dietitian of the fallopian tubes, unchanged masslike protrusion into the vagina vault. Decreased size of the peritoneal metastatic lesion adjacent to the loop of jejunum in the left upper quadrant, stable appearance of the globular uterus    6/27/18: Patient presented to ER with left lower quadrant abdominal pain  She had small bowel perforation and was transferred to [facility] where she had urgent surgery.    On 6/28/18 She underwent exploratory laparotomy, lysis of adhesions, and primary repair of jejunal perforation    Her repeat CT abdomen pelvis with contrast 8/10/18: This was compared to her scan from 6/27, and the radiologist noted a slight increase in size of the low attenuation  mass in the cervix (6.1 cm up from 5.9 cm). There are also some peripherally enhancing lesions within the subcutaneous tissues of the anterior abdomen, or may represent phlegmon or abscesses or less likely metastatic deposits.      Repeat scan on 10/17/18 at [facility] showed stable appearance of the endometrial cavity with masslike cervical distention and protrusion into the vagina    -11/13/18: She underwent exploratory laparotomy, MR-hysterectomy, BSO, bilateral PPALND, omentectomy to R0  Intraoperatively, there was a large mass prolapsing through the cervix into the vagina, no parametrial involvement. 4 small less than 1 mm nodules on the mesentery. No residual disease identified intraoperatively.  Pathology showed no residual carcinoma.    1/11/2019-2/22/19: Cycles 7-9 carboplatin/paclitaxel      Past Medical History:    Past Medical History:   Diagnosis Date     Perforated bowel (H)      Uterine cancer (H)          Past Surgical History:    Past Surgical History:   Procedure Laterality Date     HYSTERECTOMY RADICAL, SALPINGO-OOPHORECTOMY, NODE DISSECTION, TUMOR DEBULKING N/A 11/13/2018    Procedure: Exploratory Laparotomy, Lysis of adhesions x 45minutes, Total Abdominal Radical Hysterectomy, Bilateral Salpingo-Oophorectomy, Tumor Debulking, Omentectomy, Bilateral Pelvic and Para Aortic Lymph Node Dissection, Bilateral Ureterolysis, Liver mobilization;  Surgeon: Prabhjot Rm MD;  Location: [facility] OR     LAPAROTOMY EXPLORATORY           Health Maintenance Due   Topic Date Due     PREVENTIVE CARE VISIT  1960     MICROALBUMIN  1961     DIABETIC FOOT EXAM  1961     HEPATITIS C SCREENING  1961     EYE EXAM  1961     MAMMO SCREENING  1961     COLORECTAL CANCER SCREENING  07/25/1971     HIV SCREENING  07/25/1976     PNEUMOCOCCAL IMMUNIZATION 19-64 MEDIUM RISK (1 of 1 - PPSV23) 07/25/1980     HEPATITIS B IMMUNIZATION (1 of 3 - Risk 3-dose series) 07/25/1980     PAP  07/25/1982      ZOSTER IMMUNIZATION (1 of 2) 07/25/2011     A1C  02/09/2019     LIPID  06/27/2019     BMP  11/16/2019     PHQ-2  01/01/2020     INFLUENZA VACCINE (1) 09/01/2020       Current Medications:     Current Outpatient Medications   Medication Sig Dispense Refill     aspirin 81 MG chewable tablet Take 81 mg by mouth daily (with lunch) ON HOLD FOR SURGERY SINCE 11/07/2018       senna-docusate (SENOKOT-S;PERICOLACE) 8.6-50 MG per tablet Take 2 tablets by mouth 2 times daily 100 tablet 0     losartan-hydrochlorothiazide (HYZAAR) 100-12.5 MG per tablet Take 1 tablet by mouth as needed ONLY TAKES WHEN HER BLOOD PRESSURE IS HIGH           Allergies:      No Known Allergies     Social History:     Social History     Tobacco Use     Smoking status: Never Smoker     Smokeless tobacco: Never Used   Substance Use Topics     Alcohol use: Not on file       History   Drug Use Not on file         Family History:         Family History   Problem Relation Age of Onset     Cerebrovascular Disease Mother 34     Cervical Cancer No family hx of      Uterine Cancer No family hx of      Breast Cancer No family hx of      Pancreatic Cancer No family hx of      Colon Cancer No family hx of          Physical Exam:     LMP  (LMP Unknown)   There is no height or weight on file to calculate BMI.    General Appearance:  healthy and alert, no distress                Psychiatric:      appropriate mood and affect                                  Assessment:     Manda Santacruz is a 60 year old woman with a diagnosis of Stage IVB uterine serous carcinoma.      A total of 25 minutes was spent with the patient, 20 minutes of which were spent in counseling the patient and/or treatment planning.        1.  Stage IVB uterine serous carcinoma.   2.  Status post neoadjuvant chemotherapy.   3.  Stage R0 surgical cytoreduction.   4.  Probable bowel perforation during adjuvant treatment with primary surgical repair.   5.  No evidence of disease.      I discussed  with the patient.  We will now continue to follow with surveillance.  We will see her every 3-4 months for the first 2 years, then every 6 months for the next 2 years and then yearly once we get to year 5.  She agrees with this plan.  She is very appreciative of her care.  All questions were answered.         Clover Stark MD, MS    Department of Obstetrics and Gynecology   Division of Gynecologic Oncology   [facility]  Phone: [phone]      CC  Patient Care Team:  Adebayo Muro as PCP - General (Family Practice)  Alem Arguello APRN CNP as Assigned PCP  CLOVER STARK

## 2021-01-03 ENCOUNTER — HEALTH MAINTENANCE LETTER (OUTPATIENT)
Age: 61
End: 2021-01-03

## 2021-01-20 ENCOUNTER — ONCOLOGY VISIT (OUTPATIENT)
Dept: ONCOLOGY | Facility: CLINIC | Age: 61
End: 2021-01-20
Attending: OBSTETRICS & GYNECOLOGY
Payer: COMMERCIAL

## 2021-01-20 VITALS
HEART RATE: 65 BPM | BODY MASS INDEX: 42.07 KG/M2 | WEIGHT: 239.2 LBS | TEMPERATURE: 97.8 F | OXYGEN SATURATION: 99 % | DIASTOLIC BLOOD PRESSURE: 92 MMHG | SYSTOLIC BLOOD PRESSURE: 170 MMHG

## 2021-01-20 DIAGNOSIS — C54.1 ENDOMETRIAL CANCER (H): Primary | ICD-10-CM

## 2021-01-20 PROCEDURE — 99213 OFFICE O/P EST LOW 20 MIN: CPT | Performed by: OBSTETRICS & GYNECOLOGY

## 2021-01-20 PROCEDURE — G0463 HOSPITAL OUTPT CLINIC VISIT: HCPCS

## 2021-01-20 RX ORDER — LOSARTAN POTASSIUM 50 MG/1
TABLET ORAL
COMMUNITY
Start: 2021-01-18

## 2021-01-20 ASSESSMENT — PAIN SCALES - GENERAL: PAINLEVEL: NO PAIN (0)

## 2021-01-20 NOTE — LETTER
2021         RE: Manda Santacruz  [address] [facility] [address]        Dear Colleague,    Thank you for referring your patient, Manda Santacruz, to the [facility]. Please see a copy of my visit note below.                Follow Up Notes on Referred Patient    Date: 2021       Dr. Prabhjot Rm MD  [address] [place] [address]       RE: Manda Santacruz  : 1960  CULLEN: 2021    Manda Santacruz is a 58 year old woman with a diagnosis of Stage IVB uterine serous carcinoma.     The patient presents today for followup.  She has been doing well.  She finished her adjuvant chemotherapy.  She is eating and drinking well.  She did have one episode of upset stomach that resolved after stopping stool softeners and metformin. No nausea or vomiting, fever or chills.  Normal urinary and bowel function.  No vaginal bleeding or discharge.  No B symptoms.     Oncology History:  - 2018 CT abdomen pelvis with contrast to evaluate acute abdominal pain: Enlarged uterus, bilateral hydrosalpinx, ? obstructing lesion  - 2018 endometrial biopsy showed scant fragments of high-grade carcinoma, consistent with serous carcinoma, at least endometrial intraepithelial carcinoma    Eval by Dr. Rm: Large protruding mass from the cervix noted.    - 3/19/2018 CT chest abdomen pelvis with contrast: Markedly thickened endometrial stripe with masslike protrusion into the vaginal vault, ill defined peripherally enhancing masses in the left lower quadrant abutting small bowel, favored to represent metastatic deposits, hydrosalpinx/pyosalpinx, suspicious for tubo-ovarian abscess, indeterminate retroperitoneal lymph nodes    -2018-10/3/18: Six cycles neoadjuvant carboplatin/paclitaxel    2018 CT chest abdomen pelvis with contrast at [facility]: Decreased size of the peripherally enhancing fluid collection anterior to the uterus, as well as decreased  fluid dietitian of the fallopian tubes, unchanged masslike protrusion into the vagina vault. Decreased size of the peritoneal metastatic lesion adjacent to the loop of jejunum in the left upper quadrant, stable appearance of the globular uterus    6/27/18: Patient presented to ER with left lower quadrant abdominal pain  She had small bowel perforation and [place] [facility] [place]    On 6/28/18 She underwent exploratory laparotomy, lysis of adhesions, and primary repair of jejunal perforation    Her repeat CT abdomen pelvis with contrast 8/10/18: This was compared to her scan from 6/27, and the radiologist noted a slight increase in size of the low attenuation mass in the cervix (6.1 cm up from 5.9 cm). There are also some peripherally enhancing lesions within the subcutaneous tissues of the anterior abdomen, or may represent phlegmon or abscesses or less likely metastatic deposits.      Repeat scan on 10/17/18 at [place] showed stable appearance of the endometrial cavity with masslike cervical distention and protrusion into the vagina    -11/13/18: She underwent exploratory laparotomy, MR-hysterectomy, BSO, bilateral PPALND, omentectomy to R0  Intraoperatively, there was a large mass prolapsing through the cervix into the vagina, no parametrial involvement. 4 small less than 1 mm nodules on the mesentery. No residual disease identified intraoperatively.  Pathology showed no residual carcinoma.    1/11/2019-2/22/19: Cycles 7-9 carboplatin/paclitaxel       Past Medical History:    Past Medical History:   Diagnosis Date     Perforated bowel (H)      Uterine cancer (H)          Past Surgical History:    Past Surgical History:   Procedure Laterality Date     HYSTERECTOMY RADICAL, SALPINGO-OOPHORECTOMY, NODE DISSECTION, TUMOR DEBULKING N/A 11/13/2018    Procedure: Exploratory Laparotomy, Lysis of adhesions x 45minutes, Total Abdominal Radical Hysterectomy, Bilateral Salpingo-Oophorectomy,  Tumor Debulking, Omentectomy, Bilateral Pelvic and Para Aortic Lymph Node Dissection, Bilateral Ureterolysis, Liver mobilization;  Surgeon: Prabhjot Rm MD;  Location: [facility] OR     LAPAROTOMY EXPLORATORY           Health Maintenance Due   Topic Date Due     PREVENTIVE CARE VISIT  1960     MICROALBUMIN  1960     DIABETIC FOOT EXAM  1960     EYE EXAM  1960     MAMMO SCREENING  1960     Pneumococcal Vaccine: Pediatrics (0 to 5 Years) and At-Risk Patients (6 to 64 Years) (1 of 1 - PPSV23) 07/25/1967     COLORECTAL CANCER SCREENING  07/25/1971     HIV SCREENING  07/25/1976     HEPATITIS C SCREENING  07/25/1979     HEPATITIS B IMMUNIZATION (1 of 3 - Risk 3-dose series) 07/25/1980     PAP  07/25/1982     ZOSTER IMMUNIZATION (1 of 2) 07/25/2011     A1C  02/09/2019     LIPID  03/13/2019     BMP  11/16/2019     INFLUENZA VACCINE (1) 09/01/2020     PHQ-2  01/01/2021       Current Medications:     Current Outpatient Medications   Medication Sig Dispense Refill     losartan (COZAAR) 50 MG tablet        metFORMIN (GLUCOPHAGE) 1000 MG tablet        aspirin 81 MG chewable tablet Take 81 mg by mouth daily (with lunch) ON HOLD FOR SURGERY SINCE 11/07/2018       senna-docusate (SENOKOT-S;PERICOLACE) 8.6-50 MG per tablet Take 2 tablets by mouth 2 times daily (Patient not taking: Reported on 1/20/2021) 100 tablet 0         Allergies:      No Known Allergies     Social History:     Social History     Tobacco Use     Smoking status: Never Smoker     Smokeless tobacco: Never Used   Substance Use Topics     Alcohol use: Not on file       History   Drug Use Not on file         Family History:     Family History   Problem Relation Age of Onset     Cerebrovascular Disease Mother 34     Cervical Cancer No family hx of      Uterine Cancer No family hx of      Breast Cancer No family hx of      Pancreatic Cancer No family hx of      Colon Cancer No family hx of          Physical Exam:     BP (!) 170/92   Pulse  65   Temp 97.8  F (36.6  C) (Oral)   Wt 108.5 kg (239 lb 3.2 oz)   LMP  (LMP Unknown)   SpO2 99%   BMI 42.07 kg/m    Body mass index is 42.07 kg/m .    General Appearance:  healthy and alert, no distress     Musculoskeletal:         extremities non tender and without edema     Neurological:   normal gait, no gross defects                Psychiatric:      appropriate mood and affect                               Hematological:            normal cervical, supraclavicular and inguinal lymph nodes                ABDOMEN:  Soft, nontender and nondistended.  No organomegaly.  Well-healed midline incision.     PELVIC:  Normal external genitalia.  Normal vaginal mucosa.  Well-healed vaginal cuff.  No adnexal masses or tenderness.  Rectovaginal confirms.               Assessment:     Manda Santacruz is a 60 year old woman with a diagnosis of Stage IVB uterine serous carcinoma.      A total of 25 minutes was spent with the patient, 20 minutes of which were spent in counseling the patient and/or treatment planning.        1.  Stage IVB uterine serous carcinoma.   2.  Status post neoadjuvant chemotherapy.   3.  Stage R0 surgical cytoreduction.   4.  Probable bowel perforation during adjuvant treatment with primary surgical repair.   5.  No evidence of disease.      I discussed with the patient.  We will now continue to follow with surveillance.  We will see her every 3-4 months for the first 2 years, then every 6 months for the next 2 years and then yearly once we get to year 5.  She agrees with this plan.  She is very appreciative of her care.  All questions were answered.         Prabhjot Rm MD, MS    Department of Obstetrics and Gynecology   Division of Gynecologic Oncology   [facility]  Phone: [phone]       CC  Patient Care Team:  Adebayo Muro as PCP - General (Family Practice)  Alem Arguello APRN CNP as Assigned PCP

## 2021-01-20 NOTE — PROGRESS NOTES
Follow Up Notes on Referred Patient    Date: 2021       Dr. Prabhjot Rm MD  [address] [place] [address]       RE: Manda Santacruz  : 1960  CULLEN: 2021    Manda Santacruz is a 58 year old woman with a diagnosis of Stage IVB uterine serous carcinoma.     The patient presents today for followup.  She has been doing well.  She finished her adjuvant chemotherapy.  She is eating and drinking well.  She did have one episode of upset stomach that resolved after stopping stool softeners and metformin. No nausea or vomiting, fever or chills.  Normal urinary and bowel function.  No vaginal bleeding or discharge.  No B symptoms.     Oncology History:  - 2018 CT abdomen pelvis with contrast to evaluate acute abdominal pain: Enlarged uterus, bilateral hydrosalpinx, ? obstructing lesion  - 2018 endometrial biopsy showed scant fragments of high-grade carcinoma, consistent with serous carcinoma, at least endometrial intraepithelial carcinoma    Eval by Dr. Rm: Large protruding mass from the cervix noted.    - 3/19/2018 CT chest abdomen pelvis with contrast: Markedly thickened endometrial stripe with masslike protrusion into the vaginal vault, ill defined peripherally enhancing masses in the left lower quadrant abutting small bowel, favored to represent metastatic deposits, hydrosalpinx/pyosalpinx, suspicious for tubo-ovarian abscess, indeterminate retroperitoneal lymph nodes    -2018-10/3/18: Six cycles neoadjuvant carboplatin/paclitaxel    2018 CT chest abdomen pelvis with contrast at [facility]: Decreased size of the peripherally enhancing fluid collection anterior to the uterus, as well as decreased fluid dietitian of the fallopian tubes, unchanged masslike protrusion into the vagina vault. Decreased size of the peritoneal metastatic lesion adjacent to the loop of jejunum in the left upper quadrant, stable appearance of the globular uterus    18:  Patient presented to ER with left lower quadrant abdominal pain  She had small bowel perforation and was transferred [place] [facility] [place] urgent surgery.    On 6/28/18 She underwent exploratory laparotomy, lysis of adhesions, and primary repair of jejunal perforation    Her repeat CT abdomen pelvis with contrast 8/10/18: This was compared to her scan from 6/27, and the radiologist noted a slight increase in size of the low attenuation mass in the cervix (6.1 cm up from 5.9 cm). There are also some peripherally enhancing lesions within the subcutaneous tissues of the anterior abdomen, or may represent phlegmon or abscesses or less likely metastatic deposits.      Repeat scan on 10/17/18 at [place] showed stable appearance of the endometrial cavity with masslike cervical distention and protrusion into the vagina    -11/13/18: She underwent exploratory laparotomy, MR-hysterectomy, BSO, bilateral PPALND, omentectomy to R0  Intraoperatively, there was a large mass prolapsing through the cervix into the vagina, no parametrial involvement. 4 small less than 1 mm nodules on the mesentery. No residual disease identified intraoperatively.  Pathology showed no residual carcinoma.    1/11/2019-2/22/19: Cycles 7-9 carboplatin/paclitaxel       Past Medical History:    Past Medical History:   Diagnosis Date     Perforated bowel (H)      Uterine cancer (H)          Past Surgical History:    Past Surgical History:   Procedure Laterality Date     HYSTERECTOMY RADICAL, SALPINGO-OOPHORECTOMY, NODE DISSECTION, TUMOR DEBULKING N/A 11/13/2018    Procedure: Exploratory Laparotomy, Lysis of adhesions x 45minutes, Total Abdominal Radical Hysterectomy, Bilateral Salpingo-Oophorectomy, Tumor Debulking, Omentectomy, Bilateral Pelvic and Para Aortic Lymph Node Dissection, Bilateral Ureterolysis, Liver mobilization;  Surgeon: Prabhjot Rm MD;  Location: [facility] OR     LAPAROTOMY EXPLORATORY           Health Maintenance Due   Topic Date Due      PREVENTIVE CARE VISIT  1960     MICROALBUMIN  1960     DIABETIC FOOT EXAM  1960     EYE EXAM  1960     MAMMO SCREENING  1960     Pneumococcal Vaccine: Pediatrics (0 to 5 Years) and At-Risk Patients (6 to 64 Years) (1 of 1 - PPSV23) 07/25/1967     COLORECTAL CANCER SCREENING  07/25/1971     HIV SCREENING  07/25/1976     HEPATITIS C SCREENING  07/25/1979     HEPATITIS B IMMUNIZATION (1 of 3 - Risk 3-dose series) 07/25/1980     PAP  07/25/1982     ZOSTER IMMUNIZATION (1 of 2) 07/25/2011     A1C  02/09/2019     LIPID  03/13/2019     BMP  11/16/2019     INFLUENZA VACCINE (1) 09/01/2020     PHQ-2  01/01/2021       Current Medications:     Current Outpatient Medications   Medication Sig Dispense Refill     losartan (COZAAR) 50 MG tablet        metFORMIN (GLUCOPHAGE) 1000 MG tablet        aspirin 81 MG chewable tablet Take 81 mg by mouth daily (with lunch) ON HOLD FOR SURGERY SINCE 11/07/2018       senna-docusate (SENOKOT-S;PERICOLACE) 8.6-50 MG per tablet Take 2 tablets by mouth 2 times daily (Patient not taking: Reported on 1/20/2021) 100 tablet 0         Allergies:      No Known Allergies     Social History:     Social History     Tobacco Use     Smoking status: Never Smoker     Smokeless tobacco: Never Used   Substance Use Topics     Alcohol use: Not on file       History   Drug Use Not on file         Family History:     Family History   Problem Relation Age of Onset     Cerebrovascular Disease Mother 34     Cervical Cancer No family hx of      Uterine Cancer No family hx of      Breast Cancer No family hx of      Pancreatic Cancer No family hx of      Colon Cancer No family hx of          Physical Exam:     BP (!) 170/92   Pulse 65   Temp 97.8  F (36.6  C) (Oral)   Wt 108.5 kg (239 lb 3.2 oz)   LMP  (LMP Unknown)   SpO2 99%   BMI 42.07 kg/m    Body mass index is 42.07 kg/m .    General Appearance:  healthy and alert, no distress     Musculoskeletal:         extremities non  tender and without edema     Neurological:   normal gait, no gross defects                Psychiatric:      appropriate mood and affect                               Hematological:            normal cervical, supraclavicular and inguinal lymph nodes                ABDOMEN:  Soft, nontender and nondistended.  No organomegaly.  Well-healed midline incision.     PELVIC:  Normal external genitalia.  Normal vaginal mucosa.  Well-healed vaginal cuff.  No adnexal masses or tenderness.  Rectovaginal confirms.               Assessment:     Manda Santacruz is a 60 year old woman with a diagnosis of Stage IVB uterine serous carcinoma.      A total of 25 minutes was spent with the patient, 20 minutes of which were spent in counseling the patient and/or treatment planning.        1.  Stage IVB uterine serous carcinoma.   2.  Status post neoadjuvant chemotherapy.   3.  Stage R0 surgical cytoreduction.   4.  Probable bowel perforation during adjuvant treatment with primary surgical repair.   5.  No evidence of disease.      I discussed with the patient.  We will now continue to follow with surveillance.  We will see her every 3-4 months for the first 2 years, then every 6 months for the next 2 years and then yearly once we get to year 5.  She agrees with this plan.  She is very appreciative of her care.  All questions were answered.         Clover Stark MD, MS    Department of Obstetrics and Gynecology   Division of Gynecologic Oncology   [facility]  Phone: [phone]       CC  Patient Care Team:  Adebayo Muro as PCP - General (Family Practice)  Alem Arguello APRN CNP as Assigned PCP  Clover Stark MD as Assigned Cancer Care Provider  CLOVER STARK

## 2021-01-20 NOTE — NURSING NOTE
"Oncology Rooming Note    January 20, 2021 10:35 AM   Manda Santacruz is a 60 year old female who presents for:    Chief Complaint   Patient presents with     Oncology Clinic Visit     uterine cancer     Initial Vitals: BP (!) 170/92   Pulse 65   Temp 97.8  F (36.6  C) (Oral)   Wt 108.5 kg (239 lb 3.2 oz)   LMP  (LMP Unknown)   SpO2 99%   BMI 42.07 kg/m   Estimated body mass index is 42.07 kg/m  as calculated from the following:    Height as of 5/1/19: 1.606 m (5' 3.23\").    Weight as of this encounter: 108.5 kg (239 lb 3.2 oz). Body surface area is 2.2 meters squared.  No Pain (0) Comment: Data Unavailable   No LMP recorded (lmp unknown). Patient has had a hysterectomy.  Allergies reviewed: Yes  Medications reviewed: Yes    Medications: Medication refills not needed today.  Pharmacy name entered into EPIC:    [facility] [place], [place] - [address] [place] [address] [phone]  [id number] - [place] - [address] [facility] [address]  [facility] - [place] - [facility] [address]    Clinical concerns: none       Trupti Strong CMA            "

## 2021-04-25 ENCOUNTER — HEALTH MAINTENANCE LETTER (OUTPATIENT)
Age: 61
End: 2021-04-25

## 2021-08-14 ENCOUNTER — HEALTH MAINTENANCE LETTER (OUTPATIENT)
Age: 61
End: 2021-08-14

## 2021-10-10 ENCOUNTER — HEALTH MAINTENANCE LETTER (OUTPATIENT)
Age: 61
End: 2021-10-10

## 2021-12-04 ENCOUNTER — HEALTH MAINTENANCE LETTER (OUTPATIENT)
Age: 61
End: 2021-12-04

## 2022-01-29 ENCOUNTER — HEALTH MAINTENANCE LETTER (OUTPATIENT)
Age: 62
End: 2022-01-29

## 2022-03-26 ENCOUNTER — HEALTH MAINTENANCE LETTER (OUTPATIENT)
Age: 62
End: 2022-03-26

## 2022-07-16 ENCOUNTER — HEALTH MAINTENANCE LETTER (OUTPATIENT)
Age: 62
End: 2022-07-16

## 2022-09-18 ENCOUNTER — HEALTH MAINTENANCE LETTER (OUTPATIENT)
Age: 62
End: 2022-09-18

## 2023-01-28 ENCOUNTER — HEALTH MAINTENANCE LETTER (OUTPATIENT)
Age: 63
End: 2023-01-28

## 2023-05-06 ENCOUNTER — HEALTH MAINTENANCE LETTER (OUTPATIENT)
Age: 63
End: 2023-05-06

## 2023-10-08 ENCOUNTER — HEALTH MAINTENANCE LETTER (OUTPATIENT)
Age: 63
End: 2023-10-08

## 2024-02-25 ENCOUNTER — HEALTH MAINTENANCE LETTER (OUTPATIENT)
Age: 64
End: 2024-02-25

## (undated) DEVICE — NDL BLUNT 18GA 1" W/O FILTER 305181

## (undated) DEVICE — PACK AB HYST II

## (undated) DEVICE — SOL WATER IRRIG 1000ML BOTTLE 2F7114

## (undated) DEVICE — GOWN XLG DISP 9545

## (undated) DEVICE — SU VICRYL 0 CT-1 CR 8X27" UND JJ41G

## (undated) DEVICE — GLOVE PROTEXIS BLUE W/NEU-THERA 8.0  2D73EB80

## (undated) DEVICE — WIPE PREMOIST CLEANSING WASHCLOTHS 7988

## (undated) DEVICE — DRAPE SHEET REV FOLD 3/4 9349

## (undated) DEVICE — SU VICRYL 0 CT-1 36" J346H

## (undated) DEVICE — ESU LIGASURE IMPACT OPEN SEALER/DVDR CVD LG JAW LF4418

## (undated) DEVICE — PREP POVIDONE IODINE SOLUTION 10% 4OZ

## (undated) DEVICE — SU MONOCRYL 3-0 PS-1 27" Y936H

## (undated) DEVICE — SU PDS II 0 TP-1 60" Z991G

## (undated) DEVICE — SPONGE LAP 18X18" X8435

## (undated) DEVICE — SOL NACL 0.9% IRRIG 1000ML BOTTLE 2F7124

## (undated) DEVICE — DRAPE LEGGINGS CLEAR 8430

## (undated) DEVICE — LINEN TOWEL PACK X6 WHITE 5487

## (undated) DEVICE — SUCTION MANIFOLD DORNOCH ULTRA CART UL-CL500

## (undated) DEVICE — SU PROLENE 5-0 RB-1DA 36"  8556H

## (undated) DEVICE — SU VICRYL 2-0 TIE 54" J615H

## (undated) DEVICE — BNDG ABDOMINAL BINDER 9X62-84" 79-89210

## (undated) DEVICE — WIPES FOLEY CARE SURESTEP PROVON DFC100

## (undated) DEVICE — LINEN TOWEL PACK X30 5481

## (undated) DEVICE — GLOVE PROTEXIS MICRO 7.5  2D73PM75

## (undated) DEVICE — SU VICRYL 2-0 SH 27" UND J417H

## (undated) DEVICE — DRSG STERI STRIP 1/2X4" R1547

## (undated) DEVICE — SU VICRYL 0 TIE 54" J608H

## (undated) DEVICE — CLIP HORIZON LG ORANGE 004200

## (undated) DEVICE — PREP CHLORAPREP 26ML TINTED ORANGE  260815

## (undated) DEVICE — DRSG TELFA 3X8" 1238

## (undated) RX ORDER — FENTANYL CITRATE 50 UG/ML
INJECTION, SOLUTION INTRAMUSCULAR; INTRAVENOUS
Status: DISPENSED
Start: 2018-11-13

## (undated) RX ORDER — ALBUMIN, HUMAN INJ 5% 5 %
SOLUTION INTRAVENOUS
Status: DISPENSED
Start: 2018-11-13

## (undated) RX ORDER — PROPOFOL 10 MG/ML
INJECTION, EMULSION INTRAVENOUS
Status: DISPENSED
Start: 2018-11-13

## (undated) RX ORDER — DEXAMETHASONE SODIUM PHOSPHATE 4 MG/ML
INJECTION, SOLUTION INTRA-ARTICULAR; INTRALESIONAL; INTRAMUSCULAR; INTRAVENOUS; SOFT TISSUE
Status: DISPENSED
Start: 2018-11-13

## (undated) RX ORDER — CEFAZOLIN SODIUM 1 G/3ML
INJECTION, POWDER, FOR SOLUTION INTRAMUSCULAR; INTRAVENOUS
Status: DISPENSED
Start: 2018-11-13

## (undated) RX ORDER — LIDOCAINE HYDROCHLORIDE 20 MG/ML
INJECTION, SOLUTION EPIDURAL; INFILTRATION; INTRACAUDAL; PERINEURAL
Status: DISPENSED
Start: 2018-11-13

## (undated) RX ORDER — ONDANSETRON 2 MG/ML
INJECTION INTRAMUSCULAR; INTRAVENOUS
Status: DISPENSED
Start: 2018-11-13

## (undated) RX ORDER — HYDROMORPHONE HYDROCHLORIDE 1 MG/ML
INJECTION, SOLUTION INTRAMUSCULAR; INTRAVENOUS; SUBCUTANEOUS
Status: DISPENSED
Start: 2018-11-13

## (undated) RX ORDER — ALBUTEROL SULFATE 90 UG/1
AEROSOL, METERED RESPIRATORY (INHALATION)
Status: DISPENSED
Start: 2018-11-13

## (undated) RX ORDER — CEFAZOLIN SODIUM 2 G/100ML
INJECTION, SOLUTION INTRAVENOUS
Status: DISPENSED
Start: 2018-11-13

## (undated) RX ORDER — SODIUM CHLORIDE, SODIUM LACTATE, POTASSIUM CHLORIDE, CALCIUM CHLORIDE 600; 310; 30; 20 MG/100ML; MG/100ML; MG/100ML; MG/100ML
INJECTION, SOLUTION INTRAVENOUS
Status: DISPENSED
Start: 2018-11-13

## (undated) RX ORDER — PHENYLEPHRINE HCL IN 0.9% NACL 1 MG/10 ML
SYRINGE (ML) INTRAVENOUS
Status: DISPENSED
Start: 2018-11-13

## (undated) RX ORDER — EPHEDRINE SULFATE 50 MG/ML
INJECTION, SOLUTION INTRAMUSCULAR; INTRAVENOUS; SUBCUTANEOUS
Status: DISPENSED
Start: 2018-11-13

## (undated) RX ORDER — HEPARIN SODIUM 1000 [USP'U]/ML
INJECTION, SOLUTION INTRAVENOUS; SUBCUTANEOUS
Status: DISPENSED
Start: 2018-11-13